# Patient Record
Sex: FEMALE | Race: WHITE | NOT HISPANIC OR LATINO | Employment: OTHER | URBAN - METROPOLITAN AREA
[De-identification: names, ages, dates, MRNs, and addresses within clinical notes are randomized per-mention and may not be internally consistent; named-entity substitution may affect disease eponyms.]

---

## 2017-09-16 ENCOUNTER — HOSPITAL ENCOUNTER (OUTPATIENT)
Dept: GENERAL RADIOLOGY | Facility: HOSPITAL | Age: 67
Discharge: HOME | End: 2017-09-16
Attending: FAMILY MEDICINE | Admitting: FAMILY MEDICINE

## 2018-04-05 ENCOUNTER — HOSPITAL ENCOUNTER (OUTPATIENT)
Dept: MAMMOGRAPHY | Facility: HOSPITAL | Age: 68
Discharge: HOME | End: 2018-04-05
Attending: FAMILY MEDICINE | Admitting: FAMILY MEDICINE

## 2019-08-12 ENCOUNTER — TRANSCRIBE ORDERS (OUTPATIENT)
Dept: ADMINISTRATIVE | Facility: HOSPITAL | Age: 69
End: 2019-08-12

## 2019-08-12 DIAGNOSIS — Z78.0 POST-MENOPAUSAL: ICD-10-CM

## 2019-08-12 DIAGNOSIS — Z12.39 SCREENING BREAST EXAMINATION: Primary | ICD-10-CM

## 2019-08-21 ENCOUNTER — OFFICE (AMBULATORY)
Dept: URBAN - METROPOLITAN AREA PATHOLOGY 4 | Facility: PATHOLOGY | Age: 69
End: 2019-08-21

## 2019-08-21 ENCOUNTER — LAB REQUISITION (OUTPATIENT)
Dept: LAB | Facility: HOSPITAL | Age: 69
End: 2019-08-21

## 2019-08-21 ENCOUNTER — ON CAMPUS - OUTPATIENT (AMBULATORY)
Dept: URBAN - METROPOLITAN AREA HOSPITAL 2 | Facility: HOSPITAL | Age: 69
End: 2019-08-21

## 2019-08-21 VITALS
DIASTOLIC BLOOD PRESSURE: 86 MMHG | DIASTOLIC BLOOD PRESSURE: 80 MMHG | OXYGEN SATURATION: 97 % | SYSTOLIC BLOOD PRESSURE: 134 MMHG | SYSTOLIC BLOOD PRESSURE: 120 MMHG | DIASTOLIC BLOOD PRESSURE: 82 MMHG | TEMPERATURE: 97.6 F | DIASTOLIC BLOOD PRESSURE: 71 MMHG | HEART RATE: 70 BPM | HEART RATE: 62 BPM | HEART RATE: 58 BPM | HEART RATE: 73 BPM | RESPIRATION RATE: 16 BRPM | DIASTOLIC BLOOD PRESSURE: 47 MMHG | SYSTOLIC BLOOD PRESSURE: 113 MMHG | WEIGHT: 98 LBS | SYSTOLIC BLOOD PRESSURE: 155 MMHG | HEART RATE: 68 BPM | SYSTOLIC BLOOD PRESSURE: 126 MMHG | DIASTOLIC BLOOD PRESSURE: 67 MMHG | DIASTOLIC BLOOD PRESSURE: 74 MMHG | OXYGEN SATURATION: 99 % | HEART RATE: 74 BPM | HEART RATE: 63 BPM | OXYGEN SATURATION: 100 % | OXYGEN SATURATION: 98 % | OXYGEN SATURATION: 95 % | SYSTOLIC BLOOD PRESSURE: 143 MMHG | DIASTOLIC BLOOD PRESSURE: 57 MMHG | SYSTOLIC BLOOD PRESSURE: 133 MMHG | SYSTOLIC BLOOD PRESSURE: 109 MMHG

## 2019-08-21 DIAGNOSIS — K64.8 OTHER HEMORRHOIDS: ICD-10-CM

## 2019-08-21 DIAGNOSIS — K62.1 RECTAL POLYP: ICD-10-CM

## 2019-08-21 DIAGNOSIS — Z86.010 HISTORY OF COLONIC POLYPS: ICD-10-CM

## 2019-08-21 DIAGNOSIS — Z86.010 PERSONAL HISTORY OF COLONIC POLYPS: ICD-10-CM

## 2019-08-21 DIAGNOSIS — K57.30 DIVERTICULOSIS OF LARGE INTESTINE WITHOUT PERFORATION OR ABS: ICD-10-CM

## 2019-08-21 DIAGNOSIS — D12.4 BENIGN NEOPLASM OF DESCENDING COLON: ICD-10-CM

## 2019-08-21 DIAGNOSIS — K57.30 DIVERTICULOSIS OF LARGE INTESTINE WITHOUT PERFORATION OR ABSCESS WITHOUT BLEEDING: ICD-10-CM

## 2019-08-21 DIAGNOSIS — D12.0 BENIGN NEOPLASM OF CECUM: ICD-10-CM

## 2019-08-21 DIAGNOSIS — D12.8 BENIGN NEOPLASM OF RECTUM: ICD-10-CM

## 2019-08-21 LAB
GI HISTOLOGY: A. UNSPECIFIED: (no result)
GI HISTOLOGY: B. UNSPECIFIED: (no result)
GI HISTOLOGY: C. UNSPECIFIED: (no result)
GI HISTOLOGY: PDF REPORT: (no result)

## 2019-08-21 PROCEDURE — 45385 COLONOSCOPY W/LESION REMOVAL: CPT | Mod: PT | Performed by: INTERNAL MEDICINE

## 2019-08-21 PROCEDURE — 88305 TISSUE EXAM BY PATHOLOGIST: CPT | Mod: 26 | Performed by: INTERNAL MEDICINE

## 2019-08-21 PROCEDURE — 88305 TISSUE EXAM BY PATHOLOGIST: CPT | Performed by: INTERNAL MEDICINE

## 2019-08-22 LAB
LAB AP CASE REPORT: NORMAL
PATH REPORT.FINAL DX SPEC: NORMAL
PATH REPORT.GROSS SPEC: NORMAL

## 2019-08-28 ENCOUNTER — HOSPITAL ENCOUNTER (OUTPATIENT)
Dept: BONE DENSITY | Facility: HOSPITAL | Age: 69
Discharge: HOME | End: 2019-08-28

## 2019-08-28 ENCOUNTER — HOSPITAL ENCOUNTER (OUTPATIENT)
Dept: MAMMOGRAPHY | Facility: HOSPITAL | Age: 69
Discharge: HOME | End: 2019-08-28
Admitting: FAMILY MEDICINE

## 2019-08-28 DIAGNOSIS — Z12.39 SCREENING BREAST EXAMINATION: ICD-10-CM

## 2019-08-28 DIAGNOSIS — Z78.0 POST-MENOPAUSAL: ICD-10-CM

## 2019-08-28 PROCEDURE — 77063 BREAST TOMOSYNTHESIS BI: CPT

## 2019-08-28 PROCEDURE — 77067 SCR MAMMO BI INCL CAD: CPT

## 2019-08-28 PROCEDURE — 77080 DXA BONE DENSITY AXIAL: CPT

## 2019-10-31 ENCOUNTER — HOSPITAL ENCOUNTER (OUTPATIENT)
Dept: GENERAL RADIOLOGY | Facility: HOSPITAL | Age: 69
Discharge: HOME | End: 2019-10-31
Admitting: NURSE PRACTITIONER

## 2019-10-31 ENCOUNTER — TRANSCRIBE ORDERS (OUTPATIENT)
Dept: ADMINISTRATIVE | Facility: HOSPITAL | Age: 69
End: 2019-10-31

## 2019-10-31 DIAGNOSIS — J44.1 OBSTRUCTIVE CHRONIC BRONCHITIS WITH EXACERBATION (HCC): ICD-10-CM

## 2019-10-31 DIAGNOSIS — J44.1 OBSTRUCTIVE CHRONIC BRONCHITIS WITH EXACERBATION (HCC): Primary | ICD-10-CM

## 2019-10-31 PROCEDURE — 71046 X-RAY EXAM CHEST 2 VIEWS: CPT

## 2019-11-06 ENCOUNTER — HOSPITAL ENCOUNTER (EMERGENCY)
Facility: HOSPITAL | Age: 69
Discharge: HOME | End: 2019-11-06
Attending: EMERGENCY MEDICINE | Admitting: EMERGENCY MEDICINE

## 2019-11-06 ENCOUNTER — APPOINTMENT (OUTPATIENT)
Dept: MRI IMAGING | Facility: HOSPITAL | Age: 69
End: 2019-11-06

## 2019-11-06 VITALS
WEIGHT: 103.17 LBS | HEIGHT: 59 IN | RESPIRATION RATE: 16 BRPM | SYSTOLIC BLOOD PRESSURE: 141 MMHG | TEMPERATURE: 98.4 F | BODY MASS INDEX: 20.8 KG/M2 | OXYGEN SATURATION: 93 % | DIASTOLIC BLOOD PRESSURE: 86 MMHG | HEART RATE: 64 BPM

## 2019-11-06 DIAGNOSIS — M50.30 DEGENERATIVE DISC DISEASE, CERVICAL: ICD-10-CM

## 2019-11-06 DIAGNOSIS — M48.02 CERVICAL SPINAL STENOSIS: ICD-10-CM

## 2019-11-06 DIAGNOSIS — S13.9XXA ACUTE NECK SPRAIN, INITIAL ENCOUNTER: Primary | ICD-10-CM

## 2019-11-06 PROCEDURE — 25010000002 LORAZEPAM PER 2 MG: Performed by: EMERGENCY MEDICINE

## 2019-11-06 PROCEDURE — 99283 EMERGENCY DEPT VISIT LOW MDM: CPT

## 2019-11-06 PROCEDURE — 72141 MRI NECK SPINE W/O DYE: CPT

## 2019-11-06 PROCEDURE — 25010000002 KETOROLAC TROMETHAMINE PER 15 MG: Performed by: EMERGENCY MEDICINE

## 2019-11-06 PROCEDURE — 96374 THER/PROPH/DIAG INJ IV PUSH: CPT

## 2019-11-06 PROCEDURE — 96375 TX/PRO/DX INJ NEW DRUG ADDON: CPT

## 2019-11-06 RX ORDER — LORAZEPAM 2 MG/ML
1 INJECTION INTRAMUSCULAR ONCE
Status: COMPLETED | OUTPATIENT
Start: 2019-11-06 | End: 2019-11-06

## 2019-11-06 RX ORDER — HYDROCODONE BITARTRATE AND ACETAMINOPHEN 5; 325 MG/1; MG/1
1 TABLET ORAL EVERY 6 HOURS PRN
Qty: 12 TABLET | Refills: 0 | Status: SHIPPED | OUTPATIENT
Start: 2019-11-06 | End: 2020-01-07

## 2019-11-06 RX ORDER — KETOROLAC TROMETHAMINE 15 MG/ML
15 INJECTION, SOLUTION INTRAMUSCULAR; INTRAVENOUS ONCE
Status: COMPLETED | OUTPATIENT
Start: 2019-11-06 | End: 2019-11-06

## 2019-11-06 RX ORDER — METHYLPREDNISOLONE 4 MG/1
TABLET ORAL
Qty: 1 EACH | Refills: 0 | Status: SHIPPED | OUTPATIENT
Start: 2019-11-06 | End: 2020-01-07

## 2019-11-06 RX ADMIN — KETOROLAC TROMETHAMINE 15 MG: 15 INJECTION, SOLUTION INTRAMUSCULAR; INTRAVENOUS at 09:30

## 2019-11-06 RX ADMIN — LORAZEPAM 1 MG: 2 INJECTION, SOLUTION INTRAMUSCULAR; INTRAVENOUS at 09:30

## 2019-11-06 NOTE — ED NOTES
Pt states that she has had neck pain since 0430 this morning and that she can not move her neck at all.     Gregor Morataya, LPN  11/06/19 0878

## 2019-11-06 NOTE — DISCHARGE INSTRUCTIONS
Medication as directed  Also use Tylenol or ibuprofen for discomfort  No work next 5 days  No heavy lifting or pulling next 5 days  Follow-up is essential

## 2019-11-06 NOTE — ED PROVIDER NOTES
Subjective   69-year-old female complaining of increased neck pain this morning.  She states that she is been somewhat more active than usual.  She states she occasionally has radicular type pain and paresthesias in both upper extremities with the right being worse.  She is right-hand dominant.  She reports no decrease in sensation or circulation reports no recent fever chills.  She reports no nausea vomiting or diarrhea or change in bowel or bladder habits            Review of Systems   Musculoskeletal: Positive for neck pain and neck stiffness.   Neurological: Positive for weakness.   All other systems reviewed and are negative.      Past Medical History:   Diagnosis Date   • Breast cyst    • Fibrocystic breast        Allergies   Allergen Reactions   • Codeine GI Intolerance       Past Surgical History:   Procedure Laterality Date   • BREAST CYST EXCISION     • HYSTERECTOMY         Family History   Problem Relation Age of Onset   • Ovarian cancer Daughter        Social History     Socioeconomic History   • Marital status:      Spouse name: Not on file   • Number of children: Not on file   • Years of education: Not on file   • Highest education level: Not on file           Objective   Physical Exam  Alert Keller Coma Scale 15   HEENT: Pupils equal and reactive to light. Conjunctivae are not injected. normal tympanic membranes. Oropharynx and nares are normal.   Neck: Supple. Midline trachea. No JVD. No goiter.  There is bilateral sternocleidomastoid and trapezius muscle area discomfort there is also posterior midline discomfort  Chest: Clear and equal breath sounds bilaterally regular rate and rhythm without murmur or rub.   Abdomen: Positive bowel sounds nontender nondistended. No rebound or peritoneal signs. No CVA tenderness.   Extremities no clubbing cyanosis or edema motor sensory exam is normal the full range of motion is intact   skin: Warm and dry, no rashes or petechia.   Lymphatic: No regional  lymphadenopathy. No calf pain, swelling or Brittanie's sign    Procedures           ED Course          Labs Reviewed - No data to display  Medications   LORazepam (ATIVAN) injection 1 mg (1 mg Intravenous Given 11/6/19 0930)   ketorolac (TORADOL) injection 15 mg (15 mg Intravenous Given 11/6/19 0930)     Mri Cervical Spine Without Contrast    Result Date: 11/6/2019  No acute osseous abnormality. Multilevel degenerative changes are present throughout the spine likely superimposed on a congenitally narrowed canal. Altered level canal stenosis is present extending from C3-C4 through C5-C6 as above. No definite cord signal abnormalities identified though evaluation is limited due to motion artifact. Varying degrees of neural foraminal narrowing as described above.   Electronically Signed By-Opal Busby On:11/6/2019 10:20 AM This report was finalized on [id number] by  Opal Busby, .            MDM  Number of Diagnoses or Management Options     Amount and/or Complexity of Data Reviewed  Tests in the radiology section of CPT®: reviewed  Obtain history from someone other than the patient: yes  Independent visualization of images, tracings, or specimens: yes    Risk of Complications, Morbidity, and/or Mortality  Presenting problems: high  Diagnostic procedures: high  General comments: Case was discussed with family members who provided information.  The patient be discharged a prescription for hydrocodone and methylprednisolone pack.  The patient will follow up with the [facility] spine doctor she is stable at discharge and vocalized understanding of discharge instructions and warning    Pain was relieved in the emergency department    Final diagnoses:   Acute neck sprain, initial encounter   Degenerative disc disease, cervical   Cervical spinal stenosis              Perez Vernon MD  11/06/19 3499

## 2019-11-11 ENCOUNTER — TRANSCRIBE ORDERS (OUTPATIENT)
Dept: PHYSICAL THERAPY | Facility: CLINIC | Age: 69
End: 2019-11-11

## 2019-11-11 DIAGNOSIS — M47.892 OTHER OSTEOARTHRITIS OF SPINE, CERVICAL REGION: Primary | ICD-10-CM

## 2019-11-20 ENCOUNTER — TREATMENT (OUTPATIENT)
Dept: PHYSICAL THERAPY | Facility: CLINIC | Age: 69
End: 2019-11-20

## 2019-11-20 DIAGNOSIS — M47.892 OTHER OSTEOARTHRITIS OF SPINE, CERVICAL REGION: Primary | ICD-10-CM

## 2019-11-20 PROCEDURE — 97110 THERAPEUTIC EXERCISES: CPT | Performed by: PHYSICAL THERAPIST

## 2019-11-20 PROCEDURE — 97161 PT EVAL LOW COMPLEX 20 MIN: CPT | Performed by: PHYSICAL THERAPIST

## 2019-11-20 PROCEDURE — 97140 MANUAL THERAPY 1/> REGIONS: CPT | Performed by: PHYSICAL THERAPIST

## 2019-11-20 NOTE — PROGRESS NOTES
Physical Therapy Initial Evaluation and Plan of Care    Patient: Lucrecia Ramesh   : 1950  Diagnosis/ICD-10 Code:  Other osteoarthritis of spine, cervical region [M47.892]  Referring practitioner: Eugene Swanson MD  Date of Initial Visit: 2019  Today's Date: 2019  Patient seen for 1 sessions           Subjective Questionnaire: NDI: 0/50      Subjective Evaluation    History of Present Illness  Mechanism of injury: Pt is referred to therapy due to sudden onset of severe neck pain.  She woke up one morning and couldn't move her neck it was very painful. Onset 2 weeks ago. She went to ER and had MRI/ x-rays and was given meds. She took the week off and went back to work this week. Is doing a lot better now.  Has very mild pain and discomfort in the R side.  she had a stroke 5 years ago has had some discomfort and tightness in R side since her stroke.  The ER Dr said she had several bulging  discs.  Has a some  numbness in R hand since the stroke. 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 She drives. Her daughter and 3 children live with her.     Quality of life: good    Pain  No pain reported    Social Support  Lives with: adult children    Patient Goals  Patient goals for therapy: increased motion             Objective       Postural Observations  Seated posture: fair  Standing posture: fair  Correction of posture: has no consistent effect    Additional Postural Observation Details  Presents with FHP/ RS    Tenderness     Additional Tenderness Details  Mild TTP R UTs/ scap ms    Active Range of Motion     Additional Active Range of Motion Details  Cervical ROM: wfl all directions, slight inc pain and co tightness in R side/ UT/ scap ms with all movements    Cervical distraction/ compression: no change in symptoms    Supine manual traction: pulling sensation in R side no change in symptoms    UE ROM/strength: wnl B          Assessment & Plan     Assessment  Impairments: activity intolerance, lacks  appropriate home exercise program and pain with function  Assessment details: Pt is a 70 y/o f presents to therapy due to inc R sided neck pain and limited ROM and severe pain onset 2 weeks ago. She is doing better now has very mild pain in R side, UTs/ scap ms. Her ROM is wfl with mild pain in R side with cervical ROM. She has hx of stroke 5 years ago and has had mild pain and tightness in R side with mild tingling sensation in R hand since her stroke.  Presently she is not limited and only has mild pain. She may benefit from therapy for postural education and ROM/ stretching exercises to improve ms functions and resolve remaining pain and discomfort.     Goals  Plan Goals: STGs:  In 2 weeks  1- Pt will  report at least 50 % improvement and pain reduction   2- Pt will be independent with initial HEP   3- Pt will tolerate progression of HEP and her exercise program     LTGs: By DC   1- Pt will report at least 80 % improvement and pain reduction   2- Pt will be independent with final HEP and self management of her condition   3- Pt will voice readiness for DC with independent program   4- Pt will demonstrate improved posture and body mechanics    Plan  Therapy options: will be seen for skilled physical therapy services  Planned modality interventions: ultrasound  Planned therapy interventions: manual therapy, functional ROM exercises, home exercise program, postural training, strengthening, stretching and therapeutic activities  Frequency: 1x week  Treatment plan discussed with: patient  Plan details: 10 visits        See flow sheet for treatment detail    History # of Personal Factors and/or Comorbidities: MODERATE (1-2)  Examination of Body System(s): # of elements: LOW (1-2)  Clinical Presentation: STABLE   Clinical Decision Making: LOW           Timed:         Manual Therapy:    10     mins  21499;     Therapeutic Exercise:  15      mins  06088;     Neuromuscular Gabo:        mins  42351;    Therapeutic Activity:           mins  89505;     Gait Training:           mins  88191;     Ultrasound:          mins  40433;    Ionto                                   mins   46422  Self Care                            mins   46338  Canal repositioning           mins    91649      Un-Timed:  Electrical Stimulation:         mins  28968 ( );  Dry Needling          mins self-pay  Traction          mins 78831  Low Eval    20      Mins  20466  Mod Eval          Mins  92568  High Eval                            Mins  68181  Re-Eval                               mins  77702        Timed Treatment:  25    mins   Total Treatment:    45    mins    PT SIGNATURE: Terry Juarez PT, CLT   DATE TREATMENT INITIATED: 11/20/2019    Initial Certification  Certification Period: 2/18/2020  I certify that the therapy services are furnished while this patient is under my care.  The services outlined above are required by this patient, and will be reviewed every 90 days.     PHYSICIAN: Eugene Swanson MD      DATE:     Please sign and return via fax to [phone].. Thank you, [facility].

## 2019-12-23 RX ORDER — ISOSORBIDE MONONITRATE 30 MG/1
TABLET, EXTENDED RELEASE ORAL
Qty: 90 TABLET | Refills: 3 | Status: SHIPPED | OUTPATIENT
Start: 2019-12-23 | End: 2020-01-07

## 2019-12-26 ENCOUNTER — APPOINTMENT (OUTPATIENT)
Dept: GENERAL RADIOLOGY | Facility: HOSPITAL | Age: 69
End: 2019-12-26

## 2019-12-26 ENCOUNTER — HOSPITAL ENCOUNTER (EMERGENCY)
Facility: HOSPITAL | Age: 69
Discharge: HOME | End: 2019-12-26
Admitting: EMERGENCY MEDICINE

## 2019-12-26 VITALS
HEIGHT: 59 IN | WEIGHT: 104.28 LBS | OXYGEN SATURATION: 98 % | HEART RATE: 97 BPM | SYSTOLIC BLOOD PRESSURE: 107 MMHG | DIASTOLIC BLOOD PRESSURE: 64 MMHG | RESPIRATION RATE: 16 BRPM | BODY MASS INDEX: 21.02 KG/M2 | TEMPERATURE: 97.7 F

## 2019-12-26 DIAGNOSIS — M26.622 ARTHRALGIA OF LEFT TEMPOROMANDIBULAR JOINT: Primary | ICD-10-CM

## 2019-12-26 LAB
ALBUMIN SERPL-MCNC: 4.1 G/DL (ref 3.5–5.2)
ALBUMIN/GLOB SERPL: 1.5 G/DL
ALP SERPL-CCNC: 68 U/L (ref 39–117)
ALT SERPL W P-5'-P-CCNC: 12 U/L (ref 1–33)
ANION GAP SERPL CALCULATED.3IONS-SCNC: 10 MMOL/L (ref 5–15)
AST SERPL-CCNC: 18 U/L (ref 1–32)
BASOPHILS # BLD AUTO: 0.1 10*3/MM3 (ref 0–0.2)
BASOPHILS NFR BLD AUTO: 1.2 % (ref 0–1.5)
BILIRUB SERPL-MCNC: <0.2 MG/DL (ref 0.2–1.2)
BUN BLD-MCNC: 13 MG/DL (ref 8–23)
BUN/CREAT SERPL: 17.1 (ref 7–25)
CALCIUM SPEC-SCNC: 9.2 MG/DL (ref 8.6–10.5)
CHLORIDE SERPL-SCNC: 104 MMOL/L (ref 98–107)
CO2 SERPL-SCNC: 25 MMOL/L (ref 22–29)
CREAT BLD-MCNC: 0.76 MG/DL (ref 0.57–1)
DEPRECATED RDW RBC AUTO: 45.9 FL (ref 37–54)
EOSINOPHIL # BLD AUTO: 0.3 10*3/MM3 (ref 0–0.4)
EOSINOPHIL NFR BLD AUTO: 4.2 % (ref 0.3–6.2)
ERYTHROCYTE [DISTWIDTH] IN BLOOD BY AUTOMATED COUNT: 13.5 % (ref 12.3–15.4)
GFR SERPL CREATININE-BSD FRML MDRD: 75 ML/MIN/1.73
GLOBULIN UR ELPH-MCNC: 2.7 GM/DL
GLUCOSE BLD-MCNC: 108 MG/DL (ref 65–99)
HCT VFR BLD AUTO: 46.2 % (ref 34–46.6)
HGB BLD-MCNC: 15.5 G/DL (ref 12–15.9)
HOLD SPECIMEN: NORMAL
HOLD SPECIMEN: NORMAL
LYMPHOCYTES # BLD AUTO: 1 10*3/MM3 (ref 0.7–3.1)
LYMPHOCYTES NFR BLD AUTO: 17.1 % (ref 19.6–45.3)
MCH RBC QN AUTO: 32.6 PG (ref 26.6–33)
MCHC RBC AUTO-ENTMCNC: 33.5 G/DL (ref 31.5–35.7)
MCV RBC AUTO: 97.2 FL (ref 79–97)
MONOCYTES # BLD AUTO: 0.6 10*3/MM3 (ref 0.1–0.9)
MONOCYTES NFR BLD AUTO: 10.4 % (ref 5–12)
NEUTROPHILS # BLD AUTO: 4 10*3/MM3 (ref 1.7–7)
NEUTROPHILS NFR BLD AUTO: 67.1 % (ref 42.7–76)
NRBC BLD AUTO-RTO: 0.1 /100 WBC (ref 0–0.2)
PLATELET # BLD AUTO: 224 10*3/MM3 (ref 140–450)
PMV BLD AUTO: 7.1 FL (ref 6–12)
POTASSIUM BLD-SCNC: 4.2 MMOL/L (ref 3.5–5.2)
PROT SERPL-MCNC: 6.8 G/DL (ref 6–8.5)
RBC # BLD AUTO: 4.75 10*6/MM3 (ref 3.77–5.28)
SODIUM BLD-SCNC: 139 MMOL/L (ref 136–145)
TROPONIN T SERPL-MCNC: <0.01 NG/ML (ref 0–0.03)
WBC NRBC COR # BLD: 6 10*3/MM3 (ref 3.4–10.8)
WHOLE BLOOD HOLD SPECIMEN: NORMAL
WHOLE BLOOD HOLD SPECIMEN: NORMAL

## 2019-12-26 PROCEDURE — 71045 X-RAY EXAM CHEST 1 VIEW: CPT

## 2019-12-26 PROCEDURE — 99284 EMERGENCY DEPT VISIT MOD MDM: CPT

## 2019-12-26 PROCEDURE — 85025 COMPLETE CBC W/AUTO DIFF WBC: CPT | Performed by: NURSE PRACTITIONER

## 2019-12-26 PROCEDURE — 93005 ELECTROCARDIOGRAM TRACING: CPT | Performed by: NURSE PRACTITIONER

## 2019-12-26 PROCEDURE — 84484 ASSAY OF TROPONIN QUANT: CPT | Performed by: NURSE PRACTITIONER

## 2019-12-26 PROCEDURE — 70355 PANORAMIC X-RAY OF JAWS: CPT

## 2019-12-26 PROCEDURE — 80053 COMPREHEN METABOLIC PANEL: CPT | Performed by: NURSE PRACTITIONER

## 2019-12-26 RX ORDER — ASPIRIN 81 MG/1
324 TABLET, CHEWABLE ORAL ONCE
Status: COMPLETED | OUTPATIENT
Start: 2019-12-26 | End: 2019-12-26

## 2019-12-26 RX ORDER — SODIUM CHLORIDE 0.9 % (FLUSH) 0.9 %
10 SYRINGE (ML) INJECTION AS NEEDED
Status: DISCONTINUED | OUTPATIENT
Start: 2019-12-26 | End: 2019-12-26 | Stop reason: HOSPADM

## 2019-12-26 RX ORDER — LIDOCAINE 50 MG/G
1 PATCH TOPICAL ONCE
Status: DISCONTINUED | OUTPATIENT
Start: 2019-12-26 | End: 2019-12-26 | Stop reason: HOSPADM

## 2019-12-26 RX ORDER — CYCLOBENZAPRINE HCL 10 MG
10 TABLET ORAL ONCE
Status: COMPLETED | OUTPATIENT
Start: 2019-12-26 | End: 2019-12-26

## 2019-12-26 RX ORDER — CYCLOBENZAPRINE HCL 5 MG
5 TABLET ORAL 2 TIMES DAILY PRN
Qty: 15 TABLET | Refills: 0 | Status: SHIPPED | OUTPATIENT
Start: 2019-12-26 | End: 2019-12-31

## 2019-12-26 RX ORDER — LIDOCAINE 50 MG/G
1 PATCH TOPICAL EVERY 24 HOURS
Qty: 5 PATCH | Refills: 0 | Status: SHIPPED | OUTPATIENT
Start: 2019-12-26 | End: 2020-01-07

## 2019-12-26 RX ADMIN — LIDOCAINE 1 PATCH: 50 PATCH CUTANEOUS at 07:40

## 2019-12-26 RX ADMIN — CYCLOBENZAPRINE HYDROCHLORIDE 10 MG: 10 TABLET, FILM COATED ORAL at 07:00

## 2019-12-26 RX ADMIN — ASPIRIN 81 MG 324 MG: 81 TABLET ORAL at 07:00

## 2020-01-07 ENCOUNTER — APPOINTMENT (OUTPATIENT)
Dept: NUCLEAR MEDICINE | Facility: HOSPITAL | Age: 70
End: 2020-01-07

## 2020-01-07 ENCOUNTER — HOSPITAL ENCOUNTER (OUTPATIENT)
Facility: HOSPITAL | Age: 70
Setting detail: OBSERVATION
Discharge: HOME | End: 2020-01-07
Attending: EMERGENCY MEDICINE | Admitting: INTERNAL MEDICINE

## 2020-01-07 ENCOUNTER — APPOINTMENT (OUTPATIENT)
Dept: GENERAL RADIOLOGY | Facility: HOSPITAL | Age: 70
End: 2020-01-07

## 2020-01-07 VITALS
OXYGEN SATURATION: 94 % | DIASTOLIC BLOOD PRESSURE: 62 MMHG | SYSTOLIC BLOOD PRESSURE: 116 MMHG | RESPIRATION RATE: 14 BRPM | BODY MASS INDEX: 20.4 KG/M2 | TEMPERATURE: 98.4 F | HEIGHT: 59 IN | WEIGHT: 101.19 LBS | HEART RATE: 66 BPM

## 2020-01-07 DIAGNOSIS — R07.9 CHEST PAIN, UNSPECIFIED TYPE: Primary | ICD-10-CM

## 2020-01-07 PROBLEM — I10 ESSENTIAL HYPERTENSION: Chronic | Status: ACTIVE | Noted: 2020-01-07

## 2020-01-07 PROBLEM — J44.9 COPD (CHRONIC OBSTRUCTIVE PULMONARY DISEASE) (HCC): Chronic | Status: ACTIVE | Noted: 2020-01-07

## 2020-01-07 PROBLEM — E78.5 HYPERLIPIDEMIA: Chronic | Status: ACTIVE | Noted: 2020-01-07

## 2020-01-07 PROBLEM — I25.10 CAD (CORONARY ARTERY DISEASE): Chronic | Status: ACTIVE | Noted: 2020-01-07

## 2020-01-07 PROBLEM — F17.200 CURRENT SMOKER: Chronic | Status: ACTIVE | Noted: 2020-01-07

## 2020-01-07 LAB
ALBUMIN SERPL-MCNC: 4.1 G/DL (ref 3.5–5.2)
ALBUMIN SERPL-MCNC: 4.4 G/DL (ref 3.5–5.2)
ALBUMIN/GLOB SERPL: 1.6 G/DL
ALBUMIN/GLOB SERPL: 1.7 G/DL
ALP SERPL-CCNC: 71 U/L (ref 39–117)
ALP SERPL-CCNC: 73 U/L (ref 39–117)
ALT SERPL W P-5'-P-CCNC: 11 U/L (ref 1–33)
ALT SERPL W P-5'-P-CCNC: 12 U/L (ref 1–33)
ANION GAP SERPL CALCULATED.3IONS-SCNC: 11 MMOL/L (ref 5–15)
ANION GAP SERPL CALCULATED.3IONS-SCNC: 12 MMOL/L (ref 5–15)
AST SERPL-CCNC: 18 U/L (ref 1–32)
AST SERPL-CCNC: 19 U/L (ref 1–32)
BASOPHILS # BLD AUTO: 0.1 10*3/MM3 (ref 0–0.2)
BASOPHILS # BLD AUTO: 0.1 10*3/MM3 (ref 0–0.2)
BASOPHILS NFR BLD AUTO: 0.9 % (ref 0–1.5)
BASOPHILS NFR BLD AUTO: 1.1 % (ref 0–1.5)
BILIRUB SERPL-MCNC: 0.2 MG/DL (ref 0.2–1.2)
BILIRUB SERPL-MCNC: 0.2 MG/DL (ref 0.2–1.2)
BUN BLD-MCNC: 15 MG/DL (ref 8–23)
BUN BLD-MCNC: 17 MG/DL (ref 8–23)
BUN/CREAT SERPL: 21.7 (ref 7–25)
BUN/CREAT SERPL: 22.7 (ref 7–25)
CALCIUM SPEC-SCNC: 10.2 MG/DL (ref 8.6–10.5)
CALCIUM SPEC-SCNC: 10.3 MG/DL (ref 8.6–10.5)
CHLORIDE SERPL-SCNC: 104 MMOL/L (ref 98–107)
CHLORIDE SERPL-SCNC: 105 MMOL/L (ref 98–107)
CHOLEST SERPL-MCNC: 139 MG/DL (ref 0–200)
CK SERPL-CCNC: 150 U/L (ref 20–180)
CO2 SERPL-SCNC: 23 MMOL/L (ref 22–29)
CO2 SERPL-SCNC: 27 MMOL/L (ref 22–29)
CREAT BLD-MCNC: 0.69 MG/DL (ref 0.57–1)
CREAT BLD-MCNC: 0.75 MG/DL (ref 0.57–1)
DEPRECATED RDW RBC AUTO: 44.6 FL (ref 37–54)
DEPRECATED RDW RBC AUTO: 48.1 FL (ref 37–54)
EOSINOPHIL # BLD AUTO: 0.3 10*3/MM3 (ref 0–0.4)
EOSINOPHIL # BLD AUTO: 0.4 10*3/MM3 (ref 0–0.4)
EOSINOPHIL NFR BLD AUTO: 3.1 % (ref 0.3–6.2)
EOSINOPHIL NFR BLD AUTO: 4.6 % (ref 0.3–6.2)
ERYTHROCYTE [DISTWIDTH] IN BLOOD BY AUTOMATED COUNT: 13.1 % (ref 12.3–15.4)
ERYTHROCYTE [DISTWIDTH] IN BLOOD BY AUTOMATED COUNT: 13.6 % (ref 12.3–15.4)
GFR SERPL CREATININE-BSD FRML MDRD: 76 ML/MIN/1.73
GFR SERPL CREATININE-BSD FRML MDRD: 84 ML/MIN/1.73
GLOBULIN UR ELPH-MCNC: 2.4 GM/DL
GLOBULIN UR ELPH-MCNC: 2.8 GM/DL
GLUCOSE BLD-MCNC: 126 MG/DL (ref 65–99)
GLUCOSE BLD-MCNC: 133 MG/DL (ref 65–99)
HCT VFR BLD AUTO: 45.2 % (ref 34–46.6)
HCT VFR BLD AUTO: 45.7 % (ref 34–46.6)
HDLC SERPL-MCNC: 59 MG/DL (ref 40–60)
HGB BLD-MCNC: 15 G/DL (ref 12–15.9)
HGB BLD-MCNC: 15.9 G/DL (ref 12–15.9)
HOLD SPECIMEN: NORMAL
HOLD SPECIMEN: NORMAL
LDLC SERPL CALC-MCNC: 70 MG/DL (ref 0–100)
LDLC/HDLC SERPL: 1.18 {RATIO}
LIPASE SERPL-CCNC: 25 U/L (ref 13–60)
LYMPHOCYTES # BLD AUTO: 1.2 10*3/MM3 (ref 0.7–3.1)
LYMPHOCYTES # BLD AUTO: 1.9 10*3/MM3 (ref 0.7–3.1)
LYMPHOCYTES NFR BLD AUTO: 13.6 % (ref 19.6–45.3)
LYMPHOCYTES NFR BLD AUTO: 23.9 % (ref 19.6–45.3)
MAGNESIUM SERPL-MCNC: 2.3 MG/DL (ref 1.6–2.4)
MCH RBC QN AUTO: 32.5 PG (ref 26.6–33)
MCH RBC QN AUTO: 33.7 PG (ref 26.6–33)
MCHC RBC AUTO-ENTMCNC: 32.7 G/DL (ref 31.5–35.7)
MCHC RBC AUTO-ENTMCNC: 35.1 G/DL (ref 31.5–35.7)
MCV RBC AUTO: 96.1 FL (ref 79–97)
MCV RBC AUTO: 99.3 FL (ref 79–97)
MONOCYTES # BLD AUTO: 0.7 10*3/MM3 (ref 0.1–0.9)
MONOCYTES # BLD AUTO: 0.8 10*3/MM3 (ref 0.1–0.9)
MONOCYTES NFR BLD AUTO: 10.3 % (ref 5–12)
MONOCYTES NFR BLD AUTO: 8 % (ref 5–12)
NEUTROPHILS # BLD AUTO: 4.7 10*3/MM3 (ref 1.7–7)
NEUTROPHILS # BLD AUTO: 6.6 10*3/MM3 (ref 1.7–7)
NEUTROPHILS NFR BLD AUTO: 60.1 % (ref 42.7–76)
NEUTROPHILS NFR BLD AUTO: 74.4 % (ref 42.7–76)
NRBC BLD AUTO-RTO: 0 /100 WBC (ref 0–0.2)
NRBC BLD AUTO-RTO: 0.1 /100 WBC (ref 0–0.2)
NT-PROBNP SERPL-MCNC: 170.5 PG/ML (ref 5–900)
PHOSPHATE SERPL-MCNC: 5.5 MG/DL (ref 2.5–4.5)
PLATELET # BLD AUTO: 222 10*3/MM3 (ref 140–450)
PLATELET # BLD AUTO: 226 10*3/MM3 (ref 140–450)
PMV BLD AUTO: 7.2 FL (ref 6–12)
PMV BLD AUTO: 7.4 FL (ref 6–12)
POTASSIUM BLD-SCNC: 4.1 MMOL/L (ref 3.5–5.2)
POTASSIUM BLD-SCNC: 4.7 MMOL/L (ref 3.5–5.2)
PROT SERPL-MCNC: 6.5 G/DL (ref 6–8.5)
PROT SERPL-MCNC: 7.2 G/DL (ref 6–8.5)
RBC # BLD AUTO: 4.61 10*6/MM3 (ref 3.77–5.28)
RBC # BLD AUTO: 4.71 10*6/MM3 (ref 3.77–5.28)
SODIUM BLD-SCNC: 139 MMOL/L (ref 136–145)
SODIUM BLD-SCNC: 143 MMOL/L (ref 136–145)
TRIGL SERPL-MCNC: 52 MG/DL (ref 0–150)
TROPONIN T SERPL-MCNC: <0.01 NG/ML (ref 0–0.03)
TSH SERPL DL<=0.05 MIU/L-ACNC: 1.23 UIU/ML (ref 0.27–4.2)
VLDLC SERPL-MCNC: 10.4 MG/DL
WBC NRBC COR # BLD: 7.8 10*3/MM3 (ref 3.4–10.8)
WBC NRBC COR # BLD: 8.8 10*3/MM3 (ref 3.4–10.8)
WHOLE BLOOD HOLD SPECIMEN: NORMAL
WHOLE BLOOD HOLD SPECIMEN: NORMAL

## 2020-01-07 PROCEDURE — 93005 ELECTROCARDIOGRAM TRACING: CPT | Performed by: EMERGENCY MEDICINE

## 2020-01-07 PROCEDURE — G0378 HOSPITAL OBSERVATION PER HR: HCPCS

## 2020-01-07 PROCEDURE — 85025 COMPLETE CBC W/AUTO DIFF WBC: CPT | Performed by: PHYSICIAN ASSISTANT

## 2020-01-07 PROCEDURE — 71045 X-RAY EXAM CHEST 1 VIEW: CPT

## 2020-01-07 PROCEDURE — 99236 HOSP IP/OBS SAME DATE HI 85: CPT | Performed by: INTERNAL MEDICINE

## 2020-01-07 PROCEDURE — 96375 TX/PRO/DX INJ NEW DRUG ADDON: CPT

## 2020-01-07 PROCEDURE — A9500 TC99M SESTAMIBI: HCPCS | Performed by: FAMILY MEDICINE

## 2020-01-07 PROCEDURE — 85025 COMPLETE CBC W/AUTO DIFF WBC: CPT | Performed by: EMERGENCY MEDICINE

## 2020-01-07 PROCEDURE — 83880 ASSAY OF NATRIURETIC PEPTIDE: CPT | Performed by: PHYSICIAN ASSISTANT

## 2020-01-07 PROCEDURE — 96361 HYDRATE IV INFUSION ADD-ON: CPT

## 2020-01-07 PROCEDURE — 83690 ASSAY OF LIPASE: CPT | Performed by: EMERGENCY MEDICINE

## 2020-01-07 PROCEDURE — 80053 COMPREHEN METABOLIC PANEL: CPT | Performed by: PHYSICIAN ASSISTANT

## 2020-01-07 PROCEDURE — 25010000002 REGADENOSON 0.4 MG/5ML SOLUTION: Performed by: INTERNAL MEDICINE

## 2020-01-07 PROCEDURE — 84484 ASSAY OF TROPONIN QUANT: CPT | Performed by: EMERGENCY MEDICINE

## 2020-01-07 PROCEDURE — 93017 CV STRESS TEST TRACING ONLY: CPT

## 2020-01-07 PROCEDURE — 80061 LIPID PANEL: CPT | Performed by: PHYSICIAN ASSISTANT

## 2020-01-07 PROCEDURE — 96374 THER/PROPH/DIAG INJ IV PUSH: CPT

## 2020-01-07 PROCEDURE — 93016 CV STRESS TEST SUPVJ ONLY: CPT | Performed by: NURSE PRACTITIONER

## 2020-01-07 PROCEDURE — 0 TECHNETIUM SESTAMIBI: Performed by: FAMILY MEDICINE

## 2020-01-07 PROCEDURE — 83735 ASSAY OF MAGNESIUM: CPT | Performed by: PHYSICIAN ASSISTANT

## 2020-01-07 PROCEDURE — 99284 EMERGENCY DEPT VISIT MOD MDM: CPT

## 2020-01-07 PROCEDURE — 0 TECHNETIUM SESTAMIBI: Performed by: INTERNAL MEDICINE

## 2020-01-07 PROCEDURE — 25010000002 ONDANSETRON PER 1 MG: Performed by: EMERGENCY MEDICINE

## 2020-01-07 PROCEDURE — 84484 ASSAY OF TROPONIN QUANT: CPT | Performed by: PHYSICIAN ASSISTANT

## 2020-01-07 PROCEDURE — 80053 COMPREHEN METABOLIC PANEL: CPT | Performed by: EMERGENCY MEDICINE

## 2020-01-07 PROCEDURE — 82550 ASSAY OF CK (CPK): CPT | Performed by: PHYSICIAN ASSISTANT

## 2020-01-07 PROCEDURE — 84443 ASSAY THYROID STIM HORMONE: CPT | Performed by: PHYSICIAN ASSISTANT

## 2020-01-07 PROCEDURE — 78452 HT MUSCLE IMAGE SPECT MULT: CPT

## 2020-01-07 PROCEDURE — A9500 TC99M SESTAMIBI: HCPCS | Performed by: INTERNAL MEDICINE

## 2020-01-07 PROCEDURE — 84100 ASSAY OF PHOSPHORUS: CPT | Performed by: PHYSICIAN ASSISTANT

## 2020-01-07 RX ORDER — SODIUM CHLORIDE 0.9 % (FLUSH) 0.9 %
10 SYRINGE (ML) INJECTION AS NEEDED
Status: DISCONTINUED | OUTPATIENT
Start: 2020-01-07 | End: 2020-01-07 | Stop reason: HOSPADM

## 2020-01-07 RX ORDER — CALCIUM GLUCONATE 20 MG/ML
1 INJECTION, SOLUTION INTRAVENOUS AS NEEDED
Status: DISCONTINUED | OUTPATIENT
Start: 2020-01-07 | End: 2020-01-07 | Stop reason: HOSPADM

## 2020-01-07 RX ORDER — MELOXICAM 15 MG/1
15 TABLET ORAL DAILY
COMMUNITY

## 2020-01-07 RX ORDER — ASPIRIN 325 MG
325 TABLET ORAL ONCE
Status: COMPLETED | OUTPATIENT
Start: 2020-01-07 | End: 2020-01-07

## 2020-01-07 RX ORDER — NITROGLYCERIN 0.4 MG/1
0.4 TABLET SUBLINGUAL
Status: DISCONTINUED | OUTPATIENT
Start: 2020-01-07 | End: 2020-01-07 | Stop reason: HOSPADM

## 2020-01-07 RX ORDER — LISINOPRIL 20 MG/1
20 TABLET ORAL DAILY
COMMUNITY
End: 2020-04-30

## 2020-01-07 RX ORDER — MAGNESIUM SULFATE HEPTAHYDRATE 40 MG/ML
2 INJECTION, SOLUTION INTRAVENOUS AS NEEDED
Status: DISCONTINUED | OUTPATIENT
Start: 2020-01-07 | End: 2020-01-07 | Stop reason: HOSPADM

## 2020-01-07 RX ORDER — ONDANSETRON 4 MG/1
4 TABLET, FILM COATED ORAL EVERY 6 HOURS PRN
Status: DISCONTINUED | OUTPATIENT
Start: 2020-01-07 | End: 2020-01-07 | Stop reason: HOSPADM

## 2020-01-07 RX ORDER — AMLODIPINE BESYLATE 5 MG/1
5 TABLET ORAL DAILY
Status: DISCONTINUED | OUTPATIENT
Start: 2020-01-07 | End: 2020-01-07 | Stop reason: HOSPADM

## 2020-01-07 RX ORDER — KETOROLAC TROMETHAMINE 15 MG/ML
15 INJECTION, SOLUTION INTRAMUSCULAR; INTRAVENOUS EVERY 6 HOURS PRN
Status: DISCONTINUED | OUTPATIENT
Start: 2020-01-07 | End: 2020-01-07 | Stop reason: HOSPADM

## 2020-01-07 RX ORDER — POTASSIUM CHLORIDE 1.5 G/1.77G
40 POWDER, FOR SOLUTION ORAL AS NEEDED
Status: DISCONTINUED | OUTPATIENT
Start: 2020-01-07 | End: 2020-01-07 | Stop reason: HOSPADM

## 2020-01-07 RX ORDER — MAGNESIUM SULFATE HEPTAHYDRATE 40 MG/ML
4 INJECTION, SOLUTION INTRAVENOUS AS NEEDED
Status: DISCONTINUED | OUTPATIENT
Start: 2020-01-07 | End: 2020-01-07 | Stop reason: HOSPADM

## 2020-01-07 RX ORDER — LISINOPRIL 20 MG/1
20 TABLET ORAL DAILY
Status: DISCONTINUED | OUTPATIENT
Start: 2020-01-07 | End: 2020-01-07 | Stop reason: HOSPADM

## 2020-01-07 RX ORDER — SIMVASTATIN 10 MG
10 TABLET ORAL NIGHTLY
COMMUNITY

## 2020-01-07 RX ORDER — ISOSORBIDE MONONITRATE 30 MG/1
30 TABLET, EXTENDED RELEASE ORAL DAILY
Status: DISCONTINUED | OUTPATIENT
Start: 2020-01-07 | End: 2020-01-07 | Stop reason: HOSPADM

## 2020-01-07 RX ORDER — CYCLOBENZAPRINE HCL 10 MG
5 TABLET ORAL 2 TIMES DAILY PRN
Status: DISCONTINUED | OUTPATIENT
Start: 2020-01-07 | End: 2020-01-07 | Stop reason: HOSPADM

## 2020-01-07 RX ORDER — ISOSORBIDE MONONITRATE 30 MG/1
30 TABLET, EXTENDED RELEASE ORAL DAILY
COMMUNITY
End: 2020-05-18 | Stop reason: HOSPADM

## 2020-01-07 RX ORDER — NICOTINE 21 MG/24HR
1 PATCH, TRANSDERMAL 24 HOURS TRANSDERMAL DAILY
Status: DISCONTINUED | OUTPATIENT
Start: 2020-01-07 | End: 2020-01-07

## 2020-01-07 RX ORDER — AMLODIPINE BESYLATE 5 MG/1
5 TABLET ORAL DAILY
COMMUNITY
End: 2020-03-05

## 2020-01-07 RX ORDER — GABAPENTIN 300 MG/1
300 CAPSULE ORAL DAILY
Status: DISCONTINUED | OUTPATIENT
Start: 2020-01-07 | End: 2020-01-07 | Stop reason: HOSPADM

## 2020-01-07 RX ORDER — ONDANSETRON 2 MG/ML
4 INJECTION INTRAMUSCULAR; INTRAVENOUS ONCE
Status: COMPLETED | OUTPATIENT
Start: 2020-01-07 | End: 2020-01-07

## 2020-01-07 RX ORDER — ONDANSETRON 2 MG/ML
4 INJECTION INTRAMUSCULAR; INTRAVENOUS EVERY 6 HOURS PRN
Status: DISCONTINUED | OUTPATIENT
Start: 2020-01-07 | End: 2020-01-07 | Stop reason: HOSPADM

## 2020-01-07 RX ORDER — SODIUM CHLORIDE 9 MG/ML
100 INJECTION, SOLUTION INTRAVENOUS CONTINUOUS
Status: DISCONTINUED | OUTPATIENT
Start: 2020-01-07 | End: 2020-01-07

## 2020-01-07 RX ORDER — SODIUM CHLORIDE 0.9 % (FLUSH) 0.9 %
10 SYRINGE (ML) INJECTION EVERY 12 HOURS SCHEDULED
Status: DISCONTINUED | OUTPATIENT
Start: 2020-01-07 | End: 2020-01-07 | Stop reason: HOSPADM

## 2020-01-07 RX ORDER — POTASSIUM CHLORIDE 20 MEQ/1
40 TABLET, EXTENDED RELEASE ORAL AS NEEDED
Status: DISCONTINUED | OUTPATIENT
Start: 2020-01-07 | End: 2020-01-07 | Stop reason: HOSPADM

## 2020-01-07 RX ORDER — DOCUSATE SODIUM 100 MG/1
100 CAPSULE, LIQUID FILLED ORAL 2 TIMES DAILY PRN
Status: DISCONTINUED | OUTPATIENT
Start: 2020-01-07 | End: 2020-01-07 | Stop reason: HOSPADM

## 2020-01-07 RX ORDER — MELOXICAM 15 MG/1
15 TABLET ORAL DAILY
Status: DISCONTINUED | OUTPATIENT
Start: 2020-01-07 | End: 2020-01-07 | Stop reason: HOSPADM

## 2020-01-07 RX ORDER — CYCLOBENZAPRINE HCL 5 MG
5 TABLET ORAL 2 TIMES DAILY PRN
COMMUNITY
End: 2020-01-07 | Stop reason: HOSPADM

## 2020-01-07 RX ORDER — ATORVASTATIN CALCIUM 10 MG/1
10 TABLET, FILM COATED ORAL DAILY
Status: DISCONTINUED | OUTPATIENT
Start: 2020-01-07 | End: 2020-01-07 | Stop reason: HOSPADM

## 2020-01-07 RX ORDER — CALCIUM GLUCONATE 20 MG/ML
2 INJECTION, SOLUTION INTRAVENOUS AS NEEDED
Status: DISCONTINUED | OUTPATIENT
Start: 2020-01-07 | End: 2020-01-07 | Stop reason: HOSPADM

## 2020-01-07 RX ORDER — GABAPENTIN 300 MG/1
300 CAPSULE ORAL DAILY
COMMUNITY
End: 2020-03-05

## 2020-01-07 RX ORDER — CHOLECALCIFEROL (VITAMIN D3) 125 MCG
5 CAPSULE ORAL NIGHTLY PRN
Status: DISCONTINUED | OUTPATIENT
Start: 2020-01-07 | End: 2020-01-07 | Stop reason: HOSPADM

## 2020-01-07 RX ADMIN — Medication 10 ML: at 09:19

## 2020-01-07 RX ADMIN — ONDANSETRON 4 MG: 2 INJECTION INTRAMUSCULAR; INTRAVENOUS at 01:06

## 2020-01-07 RX ADMIN — ATORVASTATIN CALCIUM 10 MG: 10 TABLET, FILM COATED ORAL at 09:19

## 2020-01-07 RX ADMIN — GABAPENTIN 300 MG: 300 CAPSULE ORAL at 09:19

## 2020-01-07 RX ADMIN — SODIUM CHLORIDE 100 ML/HR: 900 INJECTION, SOLUTION INTRAVENOUS at 05:05

## 2020-01-07 RX ADMIN — AMLODIPINE BESYLATE 5 MG: 5 TABLET ORAL at 09:19

## 2020-01-07 RX ADMIN — TECHNETIUM TC 99M SESTAMIBI 1 DOSE: 1 INJECTION INTRAVENOUS at 06:45

## 2020-01-07 RX ADMIN — FAMOTIDINE 20 MG: 10 INJECTION, SOLUTION INTRAVENOUS at 01:06

## 2020-01-07 RX ADMIN — MELOXICAM 15 MG: 15 TABLET ORAL at 09:19

## 2020-01-07 RX ADMIN — REGADENOSON 0.4 MG: 0.08 INJECTION, SOLUTION INTRAVENOUS at 08:05

## 2020-01-07 RX ADMIN — LISINOPRIL 20 MG: 20 TABLET ORAL at 09:19

## 2020-01-07 RX ADMIN — TECHNETIUM TC 99M SESTAMIBI 1 DOSE: 1 INJECTION INTRAVENOUS at 08:05

## 2020-01-07 RX ADMIN — ASPIRIN 325 MG ORAL TABLET 325 MG: 325 PILL ORAL at 02:05

## 2020-01-07 RX ADMIN — ISOSORBIDE MONONITRATE 30 MG: 30 TABLET, EXTENDED RELEASE ORAL at 09:19

## 2020-01-07 RX ADMIN — NITROGLYCERIN 1 INCH: 20 OINTMENT TOPICAL at 02:05

## 2020-01-07 NOTE — ED PROVIDER NOTES
Subjective   Chief complaint nausea chest pain sweaty short of breath    History of present illness 69-year-old female who states that this evening after she laid down she developed some pressure and heaviness of her chest so she with nausea and sweating and then felt short of breath.  The patient states it started about 11:00 PM.  At time she arrived to the hospital she was feeling much better.  There was no neck arm or jaw pain.  Denies any leg pain or swelling no recent long car ride plane or immobilization or foreign travels.  Started about 11:00 PM continuous moderate degree associated with shortness of breath nausea and sweating.  Now much improved.          Review of Systems   Constitutional: Negative for chills and fever.   HENT: Negative for congestion and sore throat.    Eyes: Negative for photophobia and visual disturbance.   Respiratory: Positive for chest tightness and shortness of breath.    Cardiovascular: Positive for chest pain. Negative for leg swelling.   Gastrointestinal: Positive for nausea. Negative for abdominal pain and vomiting.   Endocrine: Negative for cold intolerance and heat intolerance.   Genitourinary: Negative for difficulty urinating and dysuria.   Musculoskeletal: Negative for arthralgias and back pain.   Skin: Negative for color change and pallor.   Neurological: Negative for dizziness and light-headedness.   Psychiatric/Behavioral: Negative for agitation and behavioral problems.       Past Medical History:   Diagnosis Date   • Allergic    • Angina pectoris (CMS/HCC)    • Arthritis    • Asthma    • Breast cyst    • Fibrocystic breast    • Injury of back    • Stroke (CMS/HCC)        Allergies   Allergen Reactions   • Codeine GI Intolerance       Past Surgical History:   Procedure Laterality Date   • BREAST CYST EXCISION     • HYSTERECTOMY         Family History   Problem Relation Age of Onset   • Ovarian cancer Daughter        Social History     Socioeconomic History   • Marital  status:      Spouse name: Not on file   • Number of children: Not on file   • Years of education: Not on file   • Highest education level: Not on file   Tobacco Use   • Smoking status: Current Every Day Smoker   Substance and Sexual Activity   • Alcohol use: No     Frequency: Never     Prior to Admission medications    Medication Sig Start Date End Date Taking? Authorizing Provider   HYDROcodone-acetaminophen (NORCO) 5-325 MG per tablet Take 1 tablet by mouth Every 6 (Six) Hours As Needed for Moderate Pain  or Severe Pain . 11/6/19   Perez Vernon MD   isosorbide mononitrate (IMDUR) 30 MG 24 hr tablet TAKE 1 TABLET BY MOUTH DAILY 12/23/19   KYLIE Cadet MD   lidocaine (LIDODERM) 5 % Place 1 patch on the skin as directed by provider Daily. Cut into 1/4s and apply to jaw & Discard patch within 12 hours or as directed by MD 12/26/19   Deb Macias NP   methylPREDNISolone (MEDROL, DONNIE,) 4 MG tablet Take as directed on package instructions. 11/6/19   Perez Vernon MD           Objective   Physical Exam  69-year-old awake alert no acute distress HEENT extraocular muscle tach sclera clear mouth clear neck supple no adenopathy no JVD no bruits lungs clear no retractions heart regular without murmur abdomen soft without tenderness no pulsatile mass or bruits extremities pulses are equal upper and lower extremities no edema cords or Homans sign evidence of DVT patient's awake alert follows commands motor strength normal without focal weakness.  Procedures           ED Course      Results for orders placed or performed during the hospital encounter of 01/07/20   Comprehensive Metabolic Panel   Result Value Ref Range    Glucose 126 (H) 65 - 99 mg/dL    BUN 17 8 - 23 mg/dL    Creatinine 0.75 0.57 - 1.00 mg/dL    Sodium 143 136 - 145 mmol/L    Potassium 4.1 3.5 - 5.2 mmol/L    Chloride 104 98 - 107 mmol/L    CO2 27.0 22.0 - 29.0 mmol/L    Calcium 10.2 8.6 - 10.5 mg/dL    Total Protein 7.2 6.0 - 8.5 g/dL     Albumin 4.40 3.50 - 5.20 g/dL    ALT (SGPT) 12 1 - 33 U/L    AST (SGOT) 19 1 - 32 U/L    Alkaline Phosphatase 73 39 - 117 U/L    Total Bilirubin 0.2 0.2 - 1.2 mg/dL    eGFR Non African Amer 76 >60 mL/min/1.73    Globulin 2.8 gm/dL    A/G Ratio 1.6 g/dL    BUN/Creatinine Ratio 22.7 7.0 - 25.0    Anion Gap 12.0 5.0 - 15.0 mmol/L   Lipase   Result Value Ref Range    Lipase 25 13 - 60 U/L   Troponin   Result Value Ref Range    Troponin T <0.010 0.000 - 0.030 ng/mL   CBC Auto Differential   Result Value Ref Range    WBC 7.80 3.40 - 10.80 10*3/mm3    RBC 4.71 3.77 - 5.28 10*6/mm3    Hemoglobin 15.9 12.0 - 15.9 g/dL    Hematocrit 45.2 34.0 - 46.6 %    MCV 96.1 79.0 - 97.0 fL    MCH 33.7 (H) 26.6 - 33.0 pg    MCHC 35.1 31.5 - 35.7 g/dL    RDW 13.1 12.3 - 15.4 %    RDW-SD 44.6 37.0 - 54.0 fl    MPV 7.4 6.0 - 12.0 fL    Platelets 226 140 - 450 10*3/mm3    Neutrophil % 60.1 42.7 - 76.0 %    Lymphocyte % 23.9 19.6 - 45.3 %    Monocyte % 10.3 5.0 - 12.0 %    Eosinophil % 4.6 0.3 - 6.2 %    Basophil % 1.1 0.0 - 1.5 %    Neutrophils, Absolute 4.70 1.70 - 7.00 10*3/mm3    Lymphocytes, Absolute 1.90 0.70 - 3.10 10*3/mm3    Monocytes, Absolute 0.80 0.10 - 0.90 10*3/mm3    Eosinophils, Absolute 0.40 0.00 - 0.40 10*3/mm3    Basophils, Absolute 0.10 0.00 - 0.20 10*3/mm3    nRBC 0.0 0.0 - 0.2 /100 WBC     No radiology results for the last day  Medications   sodium chloride 0.9 % flush 10 mL (has no administration in time range)   sodium chloride 0.9 % flush 10 mL (has no administration in time range)   aspirin tablet 325 mg (has no administration in time range)   nitroglycerin (NITROSTAT) ointment 1 inch (has no administration in time range)   ondansetron (ZOFRAN) injection 4 mg (4 mg Intravenous Given 1/7/20 0106)   famotidine (PEPCID) injection 20 mg (20 mg Intravenous Given 1/7/20 0106)     EKG my interpretation normal sinus rhythm rate of 66 left axis deviation consider anterior septal infarct which is unchanged from previous EKG QTC  of 422 abnormal     Chest x-ray negative                                      MDM  Number of Diagnoses or Management Options  Chest pain, unspecified type:   Diagnosis management comments: Medical decision making.  Patient IV established placed on a cardiac monitor and was given aspirin p.o. Zofran 4 mg IV Pepcid 20 mg IV once Nitropaste.  EKG shows no acute ST elevation.  No acute ischemic changes CBC electrolytes unremarkable troponin negative.  Chest x-ray negative.  The patient repeat exam is resting currently she does have some risk factors for heart disease.  The patient had an episode of chest tightness shortness of breath nausea and sweating tonight.  Most of her symptoms occur at night when she lies down and I suspect that ultimately this may be somewhat GI related and possible reflux hiatal hernia.  But her symptoms of sweatiness and shortness of breath are concerning.  I see no evidence of suggest DVT or pulmonary embolism or acute aortic dissection.  Patient was made aware of the findings and she will be placed in observation hospitalist nurse practitioner paged      Final diagnoses:   Chest pain, unspecified type            Abel Jung MD  01/07/20 2488

## 2020-01-07 NOTE — PROGRESS NOTES
Discharge Planning Assessment   [facility]     Patient Name: Lucrecia Ramesh  MRN: [id number]  Today's Date: 1/7/2020    Admit Date: 1/7/2020    Discharge Needs Assessment     Row Name 01/07/20 1238       Living Environment    Lives With  child(jhonny), adult;grandchild(jhonny)    Current Living Arrangements  home/apartment/condo    Able to Return to Prior Arrangements  yes       Transition Planning    Patient/Family Anticipates Transition to  home with family    Patient/Family Anticipated Services at Transition  none    Transportation Anticipated  family or friend will provide       Discharge Needs Assessment    Equipment Currently Used at Home  none        Discharge Plan     Row Name 01/07/20 1239       Plan    Plan  Home with family. Patient denies any needs at this time.     Patient/Family in Agreement with Plan  yes    Plan Comments  Barriers to discharge: Stress test ordered in am. Serial Troponins ordered.                Demographic Summary     Row Name 01/07/20 1235       General Information    Admission Type  observation    Arrived From  emergency department    Required Notices Provided  Observation Status Notice    Referral Source  other (see comments)    Reason for Consult  discharge planning    Preferred Language  English     Used During This Interaction  no       Contact Information    Permission Granted to Share Info With          Functional Status     Row Name 01/07/20 1237       Functional Status    Usual Activity Tolerance  good    Current Activity Tolerance  good       Functional Status, IADL    Medications  independent    Meal Preparation  independent    Housekeeping  independent    Laundry  independent    Shopping  independent       Mental Status    General Appearance WDL  WDL       Mental Status Summary    Recent Changes in Mental Status/Cognitive Functioning  no changes       Employment/    Employment Status  retired        Psychosocial     Row Name 01/07/20 1236        Values/Beliefs    Spiritual, Cultural Beliefs, [employer]ism Practices, Values that Affect Care  no       Behavior WDL    Behavior WDL  WDL       Emotion Mood WDL    Emotion/Mood/Affect WDL  WDL       Speech WDL    Speech WDL  WDL       Perceptual State WDL    Perceptual State WDL  WDL       Thought Process WDL    Thought Process WDL  WDL       Intellectual Performance WDL    Intellectual Performance WDL  WDL       Coping/Stress    Major Change/Loss/Stressor  none    Patient Personal Strengths  able to adapt    Sources of Support  none    Reaction to Health Status  accepting    Understanding of Condition and Treatment  adequate understanding of medical condition       C-SSRS (Screen-Recent) Past Month    Q1 Wished to be Dead (Past Month)  no    Q2 Suicidal Thoughts (Past Month)  no    Q6 Suicide Behavior (Lifetime)  no       Violence Risk    Feels Like Hurting Others  no    Previous Attempt to Harm Others  no          Anna Naegele RN    Phone [phone]  Fax   [phone]

## 2020-01-07 NOTE — H&P
"      [facility] Medicine Services      Patient Name: Lucrecia Ramesh  : 1950  MRN: [id number]  Primary Care Physician: Eugene Swanson MD  Date of admission: 2020    Patient Care Team:  Eugene Swanson MD as PCP - General          Subjective   History Present Illness     Chief Complaint:   Chief Complaint   Patient presents with   • Chest Pain       Ms. Ramesh is a 69 y.o. female with past medical history of hypertension, COPD, CAD and hyperlipidemia who presents to [facility] complaining of chest pain.  Patient states that at approximately 2300 hrs. on 2019 she began to experience a lower substernal and epigastric pain which she describes as a \"hurt\" rated at 7/10 at its worst and 1/10 presently.  She denies any provoking or palliative factors.  No radiation of the pain is reported.  She does note mild shortness of breath and nausea associated with her symptoms.  No recent fever, cough or sick contacts are reported.  Patient states that she has a history of a hiatal hernia which in the past has caused similar symptoms and she sees GI for this regularly.    In the ED patient had labs significant for troponin of less than 0.010.  Chest x-ray shows \"possible new early pulmonary edema with vascular congestion\" no cardiac enlargement is noted.  EKG shows sinus rhythm at 66 without acute ST changes present.         History of Present Illness    Review of Systems   Constitution: Negative.   HENT: Negative.    Eyes: Negative.    Cardiovascular: Positive for chest pain.   Respiratory: Positive for shortness of breath. Negative for cough.    Skin: Negative.    Musculoskeletal: Negative.    Gastrointestinal: Positive for abdominal pain and nausea.   Genitourinary: Negative.    Neurological: Negative.    Psychiatric/Behavioral: Negative.    Allergic/Immunologic: Negative.            Personal History     Past Medical History:   Past Medical History:   Diagnosis Date   • Allergic    • " Angina pectoris (CMS/HCC)    • Arthritis    • Asthma    • Breast cyst    • Fibrocystic breast    • Injury of back    • Stroke (CMS/HCC)        Surgical History:      Past Surgical History:   Procedure Laterality Date   • BREAST CYST EXCISION     • HYSTERECTOMY             Family History: family history includes Ovarian cancer in her daughter. Otherwise pertinent FHx was reviewed and unremarkable.     Social History:  reports that she has been smoking. She has been smoking about 1.50 packs per day. She has never used smokeless tobacco. She reports that she does not drink alcohol or use drugs.      Medications:  Prior to Admission medications    Medication Sig Start Date End Date Taking? Authorizing Provider   amLODIPine (NORVASC) 5 MG tablet Take 5 mg by mouth Daily.   Yes Elder Narayanan MD   cyclobenzaprine (FLEXERIL) 5 MG tablet Take 5 mg by mouth 2 (Two) Times a Day As Needed for Muscle Spasms.   Yes ProviderElder MD   Fluticasone-Umeclidin-Vilant (TRELEGY ELLIPTA) 100-62.5-25 MCG/INH aerosol powder  Inhale 1 puff Daily.   Yes ProviderElder MD   gabapentin (NEURONTIN) 300 MG capsule Take 300 mg by mouth Daily.   Yes ProviderElder MD   isosorbide mononitrate (IMDUR) 30 MG 24 hr tablet Take 30 mg by mouth Daily.   Yes ProviderElder MD   lisinopril (PRINIVIL,ZESTRIL) 20 MG tablet Take 20 mg by mouth Daily.   Yes ProviderElder MD   meloxicam (MOBIC) 15 MG tablet Take 15 mg by mouth Daily.   Yes ProviderElder MD   simvastatin (ZOCOR) 10 MG tablet Take 10 mg by mouth Every Night.   Yes ProviderElder MD   HYDROcodone-acetaminophen (NORCO) 5-325 MG per tablet Take 1 tablet by mouth Every 6 (Six) Hours As Needed for Moderate Pain  or Severe Pain . 11/6/19 1/7/20  Perez Vernon MD   isosorbide mononitrate (IMDUR) 30 MG 24 hr tablet TAKE 1 TABLET BY MOUTH DAILY 12/23/19 1/7/20  KYLIE Cadet MD   lidocaine (LIDODERM) 5 % Place 1 patch on the skin as  directed by provider Daily. Cut into 1/4s and apply to jaw & Discard patch within 12 hours or as directed by MD 12/26/19 1/7/20  Deb Macias NP   methylPREDNISolone (MEDROL, DONNIE,) 4 MG tablet Take as directed on package instructions. 11/6/19 1/7/20  Perez Vernon MD       Allergies:    Allergies   Allergen Reactions   • Codeine GI Intolerance       Objective   Objective     Vital Signs  Temp:  [97.5 °F (36.4 °C)-98.1 °F (36.7 °C)] 98.1 °F (36.7 °C)  Heart Rate:  [62-64] 62  Resp:  [15-16] 16  BP: (101-161)/(58-81) 128/64  SpO2:  [94 %-100 %] 94 %  on   ;   Device (Oxygen Therapy): room air  Body mass index is 20.44 kg/m².    Physical Exam   Constitutional: She is oriented to person, place, and time. She appears well-developed and well-nourished. No distress.   HENT:   Head: Normocephalic and atraumatic.   Right Ear: External ear normal.   Left Ear: External ear normal.   Nose: Nose normal.   Eyes: Conjunctivae and EOM are normal.   Neck: Normal range of motion. JVD present.   Cardiovascular: Normal rate, regular rhythm, normal heart sounds and intact distal pulses.   Pulmonary/Chest: Effort normal and breath sounds normal.   Abdominal: Soft. There is tenderness.   Epigastric tenderness   Musculoskeletal: Normal range of motion.   Neurological: She is alert and oriented to person, place, and time.   Skin: Skin is warm and dry.   Psychiatric: She has a normal mood and affect. Her behavior is normal. Judgment and thought content normal.   Vitals reviewed.      Results Review:  I have personally reviewed most recent cardiac tracings, lab results and radiology images and interpretations and agree with findings, most notably: Troponin, chest x-ray, EKG.    Results from last 7 days   Lab Units 01/07/20  0405   WBC 10*3/mm3 8.80   HEMOGLOBIN g/dL 15.0   HEMATOCRIT % 45.7   PLATELETS 10*3/mm3 222     Results from last 7 days   Lab Units 01/07/20  0405 01/07/20  0034   SODIUM mmol/L 139 143   POTASSIUM mmol/L 4.7 4.1     CHLORIDE mmol/L 105 104   CO2 mmol/L 23.0 27.0   BUN mg/dL 15 17   CREATININE mg/dL 0.69 0.75   GLUCOSE mg/dL 133* 126*   CALCIUM mg/dL 10.3 10.2   ALT (SGPT) U/L 11 12   AST (SGOT) U/L 18 19   TROPONIN T ng/mL <0.010 <0.010   PROBNP pg/mL 170.5  --      Estimated Creatinine Clearance: 46.7 mL/min (by C-G formula based on SCr of 0.69 mg/dL).  Brief Urine Lab Results     None          Microbiology Results (last 10 days)     ** No results found for the last 240 hours. **          ECG/EMG Results (most recent)     Procedure Component Value Units Date/Time    ECG 12 Lead [[id number]] Collected:  01/07/20 0027     Updated:  01/07/20 0028    Narrative:       HEART RATE= 66  bpm  RR Interval= 912  ms  KS Interval= 215  ms  P Horizontal Axis= -43  deg  P Front Axis= 74  deg  QRSD Interval= 67  ms  QT Interval= 403  ms  QRS Axis= -31  deg  T Wave Axis= 59  deg  - ABNORMAL ECG -  Sinus rhythm  Borderline prolonged KS interval  Left axis deviation  Consider anteroseptal infarct  Electronically Signed By:   Date and Time of Study: 2020-01-07 00:27:53                    Xr Chest 1 View    Result Date: 1/7/2020  There is possible new early pulmonary edema with vascular congestion. No cardiac enlargement is seen.  Electronically Signed By-Dr. Angel Alaniz MD On:1/7/2020 2:42 AM This report was finalized on [id number] by Dr. Angel Alaniz MD.        Estimated Creatinine Clearance: 46.7 mL/min (by C-G formula based on SCr of 0.69 mg/dL).    Assessment/Plan   Assessment/Plan       Active Hospital Problems    Diagnosis  POA   • **Chest pain [R07.9]  Yes     Priority: High   • Essential hypertension [I10]  Yes     Priority: Medium   • COPD (chronic obstructive pulmonary disease) (CMS/HCC) [J44.9]  Yes     Priority: Medium   • CAD (coronary artery disease) [I25.10]  Yes     Priority: Medium   • Hyperlipidemia [E78.5]  Yes     Priority: Low      Resolved Hospital Problems   No resolved problems to display.     Chest  pain  -Troponin less than 0.010, trend  -Chest x-ray shows possible early onset pulmonary edema with vascular congestion but no cardiomegaly.  Hold IV fluids  -EKG shows sinus rhythm at 66 without acute ST changes  -Stress test ordered in a.m.  -Continue cardiac monitoring  -Patient may need outpatient GI follow-up if cardiac work-up negative    Hypertension  -Poorly controlled with a blood pressure on admission of 161/81  - Continue Amlodipine and lisinopril  - Monitor while admittied    COPD  Patient states respiratory symptoms currently at baseline  -Continue Trelegy (pharmacy to dose)  -Monitor pulse oximetry    CAD  -Acute chest pain treated as above  -Continue cardiac monitoring  -Continue Imdur    Hyperlipidemia  -Continue statin    Active Hospital Problems:  No notes have been filed under this hospital service.  Service: Hospitalist              VTE Prophylaxis - Lovenox 40 mg SC daily.    CODE STATUS:    Code Status and Medical Interventions:   Ordered at: 01/07/20 0235     Level Of Support Discussed With:    Patient     Code Status:    CPR     Medical Interventions (Level of Support Prior to Arrest):    Full       Admission Status:  I believe this patient meets observation criteria.      I discussed the patients findings and my recommendations with patient and nursing staff.        Electronically signed by Rob Arevalo PA-C, 01/07/20, 2:37 AM.  [facility] Hospitalist Team

## 2020-01-07 NOTE — ASSESSMENT & PLAN NOTE
Lipid-lowering agents  Beta blocker  ACE inhibitor if tolerated  Antithrombotics  Control modifiable risk factors such as smoking  Controlled hypertension

## 2020-01-07 NOTE — ASSESSMENT & PLAN NOTE
Counseled to quit smoking  Long-term compliance is suspect  Nicotine replacement while in the hospital

## 2020-01-07 NOTE — PROGRESS NOTES
[id number] [place] [address]    Pulmonary Disease Educator  Discharge Planning    Patient Name: Lucrecia Ramesh  : 1950  Room #: 109/1  Pulmonologist: None  Admit Diagnosis: Chest pain, unspecified type [R07.9]  Current Admission Date: 2020   Previous Admit: Rash  Previous Admission Date: 2018  Body mass index is 20.44 kg/m².      Lucrecia Ramesh is a current smoker who that continues to smoke 1.5 PPD    PFT’s in the last year?  No       Room Air O2 Sat: 96%   Current O2: Room Air   Home O2: No  Home BiPap/CPAP: No     ABG: No results found for: SITE, ALLENTEST, PHART, OZH8MEX, PO2ART, SKJ3LGJ, BASEEXCESS, T0NLLING, HGBBG, HCTABG, OXYHEMOGLOBI, METHHGBN, CARBOXYHGB, CO2CT, BAROMETRIC, MODALITY, FIO2      Current Home Medications:  Prior to Admission medications    Medication Sig Start Date End Date Taking? Authorizing Provider   amLODIPine (NORVASC) 5 MG tablet Take 5 mg by mouth Daily.   Yes ProviderElder MD   cyclobenzaprine (FLEXERIL) 5 MG tablet Take 5 mg by mouth 2 (Two) Times a Day As Needed for Muscle Spasms.   Yes ProviderElder MD   Fluticasone-Umeclidin-Vilant (TRELEGY ELLIPTA) 100-62.5-25 MCG/INH aerosol powder  Inhale 1 puff Daily.   Yes ProviderElder MD   gabapentin (NEURONTIN) 300 MG capsule Take 300 mg by mouth Daily.   Yes Elder Narayanan MD   isosorbide mononitrate (IMDUR) 30 MG 24 hr tablet Take 30 mg by mouth Daily.   Yes Elder Narayanan MD   lisinopril (PRINIVIL,ZESTRIL) 20 MG tablet Take 20 mg by mouth Daily.   Yes Elder Narayanan MD   meloxicam (MOBIC) 15 MG tablet Take 15 mg by mouth Daily.   Yes Elder Narayanan MD   simvastatin (ZOCOR) 10 MG tablet Take 10 mg by mouth Every Night.   Yes Elder Narayanan MD   HYDROcodone-acetaminophen (NORCO) 5-325 MG per tablet Take 1 tablet by mouth Every 6 (Six) Hours As Needed for Moderate Pain  or Severe Pain . 19  Perez Vernon MD   isosorbide  mononitrate (IMDUR) 30 MG 24 hr tablet TAKE 1 TABLET BY MOUTH DAILY 12/23/19 1/7/20  KYLIE Cadet MD   lidocaine (LIDODERM) 5 % Place 1 patch on the skin as directed by provider Daily. Cut into 1/4s and apply to jaw & Discard patch within 12 hours or as directed by MD 12/26/19 1/7/20  Deb Macias NP   methylPREDNISolone (MEDROL, DONNIE,) 4 MG tablet Take as directed on package instructions. 11/6/19 1/7/20  Perez Vernon MD       Recommendations/Testing:  Lucrecia Ramesh was seen by the pulmonary disease educator for evaluation of care gaps according to the COPD GOLD Guidelines.  After evaluation the patient's cardiopulmonary status, it is the recommendation of the care coordinator that the patient receive the following testing, equipment, or consults.    PFT   6 Minute Walk      Reconciled RT Home Medications:  After evaluation the patient's cardiopulmonary status, it is the recommendation of the care coordinator that the patient receive the following medication changes or additions.    Add Albuterol MDI Q4 PRN  Add Chantix    Prior COPD Education?   No  Prior Pulmonary Rehab?   No  History of COPD admission in the past year?    No    Other Notes: Pt stated that she feels her Trelegy really works well for but would like to have a rescue inhaler to be able to take to work with her. She states her breathing is good most of the time. We talked a lot about smoking cessation and pt stated that she has quit for 2 months in the past. Pt states she is unsure if her insurance will pay any for Chantix. Pt also stated to me that she use to use 2L of oxygen but she was supposed to get it for free and they started sending her bills for more than she could afford so she returned it. Pt may need home O2 again. Pt did good with [place] exercises and really enjoyed the COPD education.    Insurance: Payor: MEDICARE / Plan: MEDICARE A & B / Product Type: *No Product type* /        EDUCATION DONE WITH PATIENT:        Smoking  Cessation         COPD           Pursed Lip Breathing         Diaphragmatic Breathing   Harmonica   Resistance Band Therapy   Relaxation Techniques      STOP BANG (=/> 3 is HIGH RISK):   Do you snore loudly? Yes  Tired/Fatigued during the day? No  Stop Breathing in sleeping? No  High B/P or treated B/P? Yes  BMI greater than 35?   No Body mass index is 20.44 kg/m².  More than 50 years old?  Yes 69 y.o.  Neck Circumference > 40cm    No  Male? No female       TOTAL 3    Has patient seen a sleep Dr.? (Who/When) No  Sleep Study Done? (When) No  Would you like a sleep tech to come talk to you about OMID? No

## 2020-01-07 NOTE — PLAN OF CARE
Problem: Patient Care Overview  Goal: Plan of Care Review  Outcome: Ongoing (interventions implemented as appropriate)  Flowsheets (Taken 1/7/2020 0242)  Progress: improving  Plan of Care Reviewed With: patient  Outcome Summary: New admission. Patient denies pain or shortness of breath. VSS.

## 2020-01-07 NOTE — DISCHARGE SUMMARY
"  Date of Admission: 1/7/2020    Date of Discharge:  1/7/2020    Length of stay:  LOS: 0 days     Presenting Problem/History of Present Illness   Present on Admission:  • Chest pain  • Essential hypertension  • CAD (coronary artery disease)  • Hyperlipidemia  • Current smoker        Hospital Course    Chief Complaint   Patient presents with   • Chest Pain       Ms. Ramesh is a 69 y.o. female with past medical history of hypertension, COPD, CAD and hyperlipidemia who presents to [facility] complaining of chest pain.  Patient states that at approximately 2300 hrs. on 1/6/2019 she began to experience a lower substernal and epigastric pain which she describes as a \"hurt\" rated at 7/10 at its worst and 1/10 presently.  She denies any provoking or palliative factors.  No radiation of the pain is reported.  She does note mild shortness of breath and nausea associated with her symptoms.  No recent fever, cough or sick contacts are reported.  Patient states that she has a history of a hiatal hernia which in the past has caused similar symptoms and she sees GI for this regularly.     In the ED patient had labs significant for troponin of less than 0.010.  Chest x-ray shows \"possible new early pulmonary edema with vascular congestion\" no cardiac enlargement is noted.  EKG shows sinus rhythm at 66 without acute ST changes present.         ROS   Review of Systems   Constitution: Negative.   HENT: Negative.    Eyes: Negative.    Cardiovascular: Positive for chest pain.   Respiratory: Positive for shortness of breath. Negative for cough.    Skin: Negative.    Musculoskeletal: Negative.    Gastrointestinal: Positive for abdominal pain and nausea.   Genitourinary: Negative.    Neurological: Negative.    Psychiatric/Behavioral: Negative.    Allergic/Immunologic: Negative.        Past Medical History:     Past Medical History:   Diagnosis Date   • Allergic    • Angina pectoris (CMS/HCC)    • Arthritis    • Asthma    • Breast " cyst    • Fibrocystic breast    • Injury of back    • Stroke (CMS/HCC)        Past Surgical History:     Past Surgical History:   Procedure Laterality Date   • BREAST CYST EXCISION     • HYSTERECTOMY         Social History:   Social History     Socioeconomic History   • Marital status:      Spouse name: Not on file   • Number of children: Not on file   • Years of education: Not on file   • Highest education level: Not on file   Tobacco Use   • Smoking status: Current Every Day Smoker     Packs/day: 1.50   • Smokeless tobacco: Never Used   Substance and Sexual Activity   • Alcohol use: No     Frequency: Never   • Drug use: Never   • Sexual activity: Defer       Vital Signs  Temp:  [97.5 °F (36.4 °C)-98.4 °F (36.9 °C)] 98.4 °F (36.9 °C)  Heart Rate:  [54-66] 66  Resp:  [14-17] 14  BP: (101-161)/(58-81) 116/62    Physical Exam:  Physical Exam   Constitutional: She is oriented to person, place, and time. She appears well-developed and well-nourished. No distress.   HENT:   Head: Normocephalic and atraumatic.   Right Ear: External ear normal.   Left Ear: External ear normal.   Nose: Nose normal.   Mouth/Throat: Oropharynx is clear and moist. No oropharyngeal exudate.   Eyes: Pupils are equal, round, and reactive to light. Conjunctivae and EOM are normal. Right eye exhibits no discharge. Left eye exhibits no discharge. No scleral icterus.   Neck: Normal range of motion. No JVD present. No tracheal deviation present. No thyromegaly present.   Cardiovascular: Normal rate, regular rhythm, normal heart sounds and intact distal pulses. Exam reveals no gallop and no friction rub.   No murmur heard.  Pulmonary/Chest: Effort normal and breath sounds normal. No stridor. No respiratory distress. She has no wheezes. She has no rales. She exhibits no tenderness.   Abdominal: Soft. Bowel sounds are normal. She exhibits no distension and no mass. There is no tenderness. There is no rebound and no guarding. No hernia.    Musculoskeletal: Normal range of motion. She exhibits no edema, tenderness or deformity.   Lymphadenopathy:     She has no cervical adenopathy.   Neurological: She is alert and oriented to person, place, and time. No cranial nerve deficit or sensory deficit. She exhibits normal muscle tone. Coordination normal.   Skin: Skin is warm and dry. No rash noted. She is not diaphoretic. No erythema.   Psychiatric: She has a normal mood and affect. Her behavior is normal.   Nursing note and vitals reviewed.          Discharge Diagnosis:     Active Hospital Problems    Diagnosis  POA   • **Chest pain [R07.9]  Yes     Priority: High   • Essential hypertension [I10]  Yes     Priority: Medium   • CAD (coronary artery disease) [I25.10]  Yes   • Hyperlipidemia [E78.5]  Yes   • Current smoker [F17.200]  Yes      Resolved Hospital Problems   No resolved problems to display.       Estimated Creatinine Clearance: 46.7 mL/min (by C-G formula based on SCr of 0.69 mg/dL).    Discharge Disposition    Destination      Coordination has not been started for this encounter.      Durable Medical Equipment      Coordination has not been started for this encounter.      Dialysis/Infusion      Coordination has not been started for this encounter.      Home Medical Care      Coordination has not been started for this encounter.      Therapy      Coordination has not been started for this encounter.      Community Resources      Coordination has not been started for this encounter.              PT Recommendation and Plan          Home or Self Care           Discharge Medications      Continue These Medications      Instructions Start Date   amLODIPine 5 MG tablet  Commonly known as:  NORVASC   5 mg, Oral, Daily      gabapentin 300 MG capsule  Commonly known as:  NEURONTIN   300 mg, Oral, Daily      isosorbide mononitrate 30 MG 24 hr tablet  Commonly known as:  IMDUR   30 mg, Oral, Daily      lisinopril 20 MG tablet  Commonly known as:   PRINIVIL,ZESTRIL   20 mg, Oral, Daily      meloxicam 15 MG tablet  Commonly known as:  MOBIC   15 mg, Oral, Daily      simvastatin 10 MG tablet  Commonly known as:  ZOCOR   10 mg, Oral, Nightly      TRELEGY ELLIPTA 100-62.5-25 MCG/INH aerosol powder   Generic drug:  Fluticasone-Umeclidin-Vilant   1 puff, Inhalation, Daily         Stop These Medications    cyclobenzaprine 5 MG tablet  Commonly known as:  FLEXERIL                Consults:   Consults     Date and Time Order Name Status Description    1/7/2020 0115 Hospitalist (on-call MD unless specified) Completed           Procedures Performed:         Pertinent Test Results:     I reviewed the patient's new clinical results.    Results from last 7 days   Lab Units 01/07/20  0405 01/07/20  0034   WBC 10*3/mm3 8.80 7.80   HEMOGLOBIN g/dL 15.0 15.9   HEMATOCRIT % 45.7 45.2   PLATELETS 10*3/mm3 222 226     Results from last 7 days   Lab Units 01/07/20  0405 01/07/20  0034   SODIUM mmol/L 139 143   POTASSIUM mmol/L 4.7 4.1   CHLORIDE mmol/L 105 104   CO2 mmol/L 23.0 27.0   BUN mg/dL 15 17   CREATININE mg/dL 0.69 0.75   CALCIUM mg/dL 10.3 10.2   BILIRUBIN mg/dL 0.2 0.2   ALK PHOS U/L 71 73   ALT (SGPT) U/L 11 12   AST (SGOT) U/L 18 19   GLUCOSE mg/dL 133* 126*     Results from last 7 days   Lab Units 01/07/20  0405   MAGNESIUM mg/dL 2.3     Lab Results   Component Value Date    CALCIUM 10.3 01/07/2020    PHOS 5.5 (H) 01/07/2020     No results found for: HGBA1C  Lab Results   Component Value Date    CHOL 139 01/07/2020    TRIG 52 01/07/2020    HDL 59 01/07/2020    LDL 70 01/07/2020     Lab Results   Component Value Date    LIPASE 25 01/07/2020           Lab Results   Lab Value Date/Time    FINALDX  08/21/2019 0941     Specimen #1 (Colon, cecum, polypectomy):    Tubular adenoma     Specimen #2 (Colon, descending, polypectomy):    Tubular adenoma     Specimen #3 (Colon, rectum, polypectomy):    Tubular adenoma and hyperplastic polyp    JPR/tkrakel          Microbiology Results  (last 10 days)     ** No results found for the last 240 hours. **          ECG/EMG Results (most recent)     Procedure Component Value Units Date/Time    ECG 12 Lead [[id number]] Collected:  01/07/20 0027     Updated:  01/07/20 0621    Narrative:       HEART RATE= 66  bpm  RR Interval= 912  ms  NY Interval= 215  ms  P Horizontal Axis= -43  deg  P Front Axis= 74  deg  QRSD Interval= 67  ms  QT Interval= 403  ms  QRS Axis= -31  deg  T Wave Axis= 59  deg  - ABNORMAL ECG -  Sinus rhythm  Borderline prolonged NY interval  Left axis deviation  Consider anteroseptal infarct  When compared with ECG of 26-Dec-2019 6:53:12,  No significant change  Electronically Signed By: Abel Jung (PENNY) 07-Jan-2020 06:20:45  Date and Time of Study: 2020-01-07 00:27:53                    Xr Chest 1 View    Result Date: 1/7/2020  There is possible new early pulmonary edema with vascular congestion. No cardiac enlargement is seen.  Electronically Signed By-Dr. Angel Alaniz MD On:1/7/2020 2:42 AM This report was finalized on [id number] by Dr. Angel Alaniz MD.      Xrays, labs reviewed personally by physician.    Condition on Discharge:    Stable    Discharge Diet:   Dietary Orders (From admission, onward)     Start     Ordered    01/07/20 0901  Diet Cardiac; Healthy Heart  Diet Effective Now     Question Answer Comment   Diet / Texture / Consistency Cardiac    Select Type: Healthy Heart        01/07/20 0901                Activity at Discharge:   Activity Instructions     Activity as Tolerated            Follow-up Appointments  Future Appointments   Date Time Provider Department Center   6/8/2020  3:00 PM KYLIE Cadet MD [facility]     Additional Instructions for the Follow-ups that You Need to Schedule     Discharge Follow-up with PCP   As directed       Currently Documented PCP:    Eugene Swanson MD    PCP Phone Number:    [phone]     Follow Up Details:  If no PCP, call MD finder at [phone]                Test Results Pending at Discharge       Risk for Readmission (LACE) Score: 3 (1/7/2020  6:00 AM)          Hi Hawthorne MD  01/07/20  4:21 PM    Stress Myoview negative for ischemia.

## 2020-01-08 ENCOUNTER — OFFICE (AMBULATORY)
Dept: URBAN - METROPOLITAN AREA CLINIC 64 | Facility: CLINIC | Age: 70
End: 2020-01-08

## 2020-01-08 VITALS — SYSTOLIC BLOOD PRESSURE: 163 MMHG | WEIGHT: 184 LBS | DIASTOLIC BLOOD PRESSURE: 99 MMHG | HEART RATE: 71 BPM

## 2020-01-08 DIAGNOSIS — R10.13 EPIGASTRIC PAIN: ICD-10-CM

## 2020-01-08 DIAGNOSIS — R11.0 NAUSEA: ICD-10-CM

## 2020-01-08 DIAGNOSIS — K30 FUNCTIONAL DYSPEPSIA: ICD-10-CM

## 2020-01-08 DIAGNOSIS — R14.0 ABDOMINAL DISTENSION (GASEOUS): ICD-10-CM

## 2020-01-08 PROCEDURE — 99214 OFFICE O/P EST MOD 30 MIN: CPT | Performed by: NURSE PRACTITIONER

## 2020-01-08 RX ORDER — ONDANSETRON 4 MG/1
12 TABLET, ORALLY DISINTEGRATING ORAL
Qty: 90 | Refills: 4 | Status: COMPLETED
Start: 2020-01-08 | End: 2022-06-01

## 2020-01-29 ENCOUNTER — ON CAMPUS - OUTPATIENT (AMBULATORY)
Dept: URBAN - METROPOLITAN AREA HOSPITAL 2 | Facility: HOSPITAL | Age: 70
End: 2020-01-29

## 2020-01-29 VITALS
HEART RATE: 77 BPM | DIASTOLIC BLOOD PRESSURE: 42 MMHG | WEIGHT: 105 LBS | SYSTOLIC BLOOD PRESSURE: 89 MMHG | OXYGEN SATURATION: 97 % | SYSTOLIC BLOOD PRESSURE: 144 MMHG | HEART RATE: 72 BPM | HEART RATE: 67 BPM | OXYGEN SATURATION: 100 % | SYSTOLIC BLOOD PRESSURE: 146 MMHG | DIASTOLIC BLOOD PRESSURE: 100 MMHG | RESPIRATION RATE: 19 BRPM | SYSTOLIC BLOOD PRESSURE: 154 MMHG | TEMPERATURE: 98 F | HEART RATE: 69 BPM | OXYGEN SATURATION: 98 % | RESPIRATION RATE: 18 BRPM | RESPIRATION RATE: 16 BRPM | SYSTOLIC BLOOD PRESSURE: 145 MMHG | DIASTOLIC BLOOD PRESSURE: 76 MMHG | OXYGEN SATURATION: 99 % | DIASTOLIC BLOOD PRESSURE: 95 MMHG | HEART RATE: 71 BPM | DIASTOLIC BLOOD PRESSURE: 83 MMHG | HEART RATE: 66 BPM | SYSTOLIC BLOOD PRESSURE: 141 MMHG

## 2020-01-29 DIAGNOSIS — R13.10 DYSPHAGIA, UNSPECIFIED: ICD-10-CM

## 2020-01-29 DIAGNOSIS — K44.9 DIAPHRAGMATIC HERNIA WITHOUT OBSTRUCTION OR GANGRENE: ICD-10-CM

## 2020-01-29 PROCEDURE — 43450 DILATE ESOPHAGUS 1/MULT PASS: CPT | Performed by: INTERNAL MEDICINE

## 2020-01-29 PROCEDURE — 43235 EGD DIAGNOSTIC BRUSH WASH: CPT | Performed by: INTERNAL MEDICINE

## 2020-01-29 RX ORDER — OMEPRAZOLE 40 MG/1
40 CAPSULE, DELAYED RELEASE ORAL
Qty: 90 | Refills: 3 | Status: COMPLETED
Start: 2015-04-09 | End: 2022-05-02

## 2020-01-30 LAB
BH CV NUCLEAR PRIOR STUDY: 3
BH CV STRESS BP STAGE 1: NORMAL
BH CV STRESS BP STAGE 2: NORMAL
BH CV STRESS BP STAGE 3: NORMAL
BH CV STRESS BP STAGE 4: NORMAL
BH CV STRESS COMMENTS STAGE 1: NORMAL
BH CV STRESS COMMENTS STAGE 2: NORMAL
BH CV STRESS DOSE REGADENOSON STAGE 1: 0.4
BH CV STRESS DURATION MIN STAGE 1: 0
BH CV STRESS DURATION MIN STAGE 2: 4
BH CV STRESS DURATION SEC STAGE 1: 10
BH CV STRESS DURATION SEC STAGE 2: 0
BH CV STRESS HR STAGE 1: 73
BH CV STRESS HR STAGE 2: 68
BH CV STRESS HR STAGE 3: 83
BH CV STRESS HR STAGE 4: 82
BH CV STRESS PROTOCOL 1: NORMAL
BH CV STRESS RECOVERY BP: NORMAL MMHG
BH CV STRESS RECOVERY HR: 78 BPM
BH CV STRESS STAGE 1: 1
BH CV STRESS STAGE 2: 2
BH CV STRESS STAGE 3: 3
BH CV STRESS STAGE 4: 4
LV EF NUC BP: 75 %
MAXIMAL PREDICTED HEART RATE: 149 BPM
PERCENT MAX PREDICTED HR: 55.7 %
STRESS BASELINE BP: NORMAL MMHG
STRESS BASELINE HR: 58 BPM
STRESS PERCENT HR: 66 %
STRESS POST PEAK BP: NORMAL MMHG
STRESS POST PEAK HR: 83 BPM
STRESS TARGET HR: 127 BPM

## 2020-01-30 PROCEDURE — 93018 CV STRESS TEST I&R ONLY: CPT | Performed by: INTERNAL MEDICINE

## 2020-01-30 PROCEDURE — 78452 HT MUSCLE IMAGE SPECT MULT: CPT | Performed by: INTERNAL MEDICINE

## 2020-02-14 ENCOUNTER — DOCUMENTATION (OUTPATIENT)
Dept: PHYSICAL THERAPY | Facility: CLINIC | Age: 70
End: 2020-02-14

## 2020-02-14 NOTE — PROGRESS NOTES
Discharge Summary  Discharge Summary from Physical/Occupational Therapy Report    Patient: Lucrecia Ramesh   : 1950  Today's Date: 2020    Patient seen for 1 visit    Discharge Status of Patient: See eval note dated 19      Comments ; pt did not return for additional treatment since her eval on 19 and will be DC from our services.      SIGNATURE: Terry Juarez PT

## 2020-03-05 RX ORDER — GABAPENTIN 300 MG/1
CAPSULE ORAL
Qty: 90 CAPSULE | Refills: 2 | Status: SHIPPED | OUTPATIENT
Start: 2020-03-05 | End: 2020-09-14

## 2020-03-05 RX ORDER — AMLODIPINE BESYLATE 5 MG/1
TABLET ORAL
Qty: 90 TABLET | Refills: 0 | Status: SHIPPED | OUTPATIENT
Start: 2020-03-05 | End: 2020-06-01

## 2020-04-30 RX ORDER — LISINOPRIL 20 MG/1
TABLET ORAL
Qty: 90 TABLET | Refills: 1 | Status: SHIPPED | OUTPATIENT
Start: 2020-04-30 | End: 2020-12-13

## 2020-05-17 ENCOUNTER — APPOINTMENT (OUTPATIENT)
Dept: GENERAL RADIOLOGY | Facility: HOSPITAL | Age: 70
End: 2020-05-17

## 2020-05-17 ENCOUNTER — HOSPITAL ENCOUNTER (OUTPATIENT)
Facility: HOSPITAL | Age: 70
Setting detail: OBSERVATION
Discharge: HOME | End: 2020-05-18
Attending: EMERGENCY MEDICINE | Admitting: HOSPITALIST

## 2020-05-17 DIAGNOSIS — R07.9 CHEST PAIN, UNSPECIFIED TYPE: Primary | ICD-10-CM

## 2020-05-17 LAB
ALBUMIN SERPL-MCNC: 4.1 G/DL (ref 3.5–5.2)
ALBUMIN/GLOB SERPL: 2.1 G/DL
ALP SERPL-CCNC: 58 U/L (ref 39–117)
ALT SERPL W P-5'-P-CCNC: 14 U/L (ref 1–33)
ANION GAP SERPL CALCULATED.3IONS-SCNC: 12 MMOL/L (ref 5–15)
AST SERPL-CCNC: 24 U/L (ref 1–32)
BASOPHILS # BLD AUTO: 0.1 10*3/MM3 (ref 0–0.2)
BASOPHILS NFR BLD AUTO: 1.2 % (ref 0–1.5)
BILIRUB SERPL-MCNC: 0.2 MG/DL (ref 0.2–1.2)
BUN BLD-MCNC: 19 MG/DL (ref 8–23)
BUN/CREAT SERPL: 21.6 (ref 7–25)
CALCIUM SPEC-SCNC: 9.3 MG/DL (ref 8.6–10.5)
CHLORIDE SERPL-SCNC: 100 MMOL/L (ref 98–107)
CO2 SERPL-SCNC: 25 MMOL/L (ref 22–29)
CREAT BLD-MCNC: 0.88 MG/DL (ref 0.57–1)
DEPRECATED RDW RBC AUTO: 42.9 FL (ref 37–54)
EOSINOPHIL # BLD AUTO: 0.3 10*3/MM3 (ref 0–0.4)
EOSINOPHIL NFR BLD AUTO: 4.8 % (ref 0.3–6.2)
ERYTHROCYTE [DISTWIDTH] IN BLOOD BY AUTOMATED COUNT: 12.8 % (ref 12.3–15.4)
GFR SERPL CREATININE-BSD FRML MDRD: 63 ML/MIN/1.73
GLOBULIN UR ELPH-MCNC: 2 GM/DL
GLUCOSE BLD-MCNC: 147 MG/DL (ref 65–99)
HCT VFR BLD AUTO: 44.1 % (ref 34–46.6)
HGB BLD-MCNC: 14.8 G/DL (ref 12–15.9)
HOLD SPECIMEN: NORMAL
HOLD SPECIMEN: NORMAL
LYMPHOCYTES # BLD AUTO: 1.2 10*3/MM3 (ref 0.7–3.1)
LYMPHOCYTES NFR BLD AUTO: 19.9 % (ref 19.6–45.3)
MCH RBC QN AUTO: 31.9 PG (ref 26.6–33)
MCHC RBC AUTO-ENTMCNC: 33.6 G/DL (ref 31.5–35.7)
MCV RBC AUTO: 95.1 FL (ref 79–97)
MONOCYTES # BLD AUTO: 0.6 10*3/MM3 (ref 0.1–0.9)
MONOCYTES NFR BLD AUTO: 9 % (ref 5–12)
NEUTROPHILS # BLD AUTO: 4.1 10*3/MM3 (ref 1.7–7)
NEUTROPHILS NFR BLD AUTO: 65.1 % (ref 42.7–76)
NRBC BLD AUTO-RTO: 0.1 /100 WBC (ref 0–0.2)
PLATELET # BLD AUTO: 215 10*3/MM3 (ref 140–450)
PMV BLD AUTO: 7 FL (ref 6–12)
POTASSIUM BLD-SCNC: 4.2 MMOL/L (ref 3.5–5.2)
PROT SERPL-MCNC: 6.1 G/DL (ref 6–8.5)
RBC # BLD AUTO: 4.64 10*6/MM3 (ref 3.77–5.28)
SARS-COV-2 RNA RESP QL NAA+PROBE: NOT DETECTED
SODIUM BLD-SCNC: 137 MMOL/L (ref 136–145)
TROPONIN T SERPL-MCNC: <0.01 NG/ML (ref 0–0.03)
TROPONIN T SERPL-MCNC: <0.01 NG/ML (ref 0–0.03)
WBC NRBC COR # BLD: 6.3 10*3/MM3 (ref 3.4–10.8)
WHOLE BLOOD HOLD SPECIMEN: NORMAL
WHOLE BLOOD HOLD SPECIMEN: NORMAL

## 2020-05-17 PROCEDURE — 93005 ELECTROCARDIOGRAM TRACING: CPT | Performed by: EMERGENCY MEDICINE

## 2020-05-17 PROCEDURE — 99285 EMERGENCY DEPT VISIT HI MDM: CPT

## 2020-05-17 PROCEDURE — 80053 COMPREHEN METABOLIC PANEL: CPT | Performed by: EMERGENCY MEDICINE

## 2020-05-17 PROCEDURE — 87635 SARS-COV-2 COVID-19 AMP PRB: CPT | Performed by: EMERGENCY MEDICINE

## 2020-05-17 PROCEDURE — 84484 ASSAY OF TROPONIN QUANT: CPT | Performed by: HOSPITALIST

## 2020-05-17 PROCEDURE — G0378 HOSPITAL OBSERVATION PER HR: HCPCS

## 2020-05-17 PROCEDURE — 84484 ASSAY OF TROPONIN QUANT: CPT | Performed by: EMERGENCY MEDICINE

## 2020-05-17 PROCEDURE — 99219 PR INITIAL OBSERVATION CARE/DAY 50 MINUTES: CPT | Performed by: HOSPITALIST

## 2020-05-17 PROCEDURE — 93005 ELECTROCARDIOGRAM TRACING: CPT

## 2020-05-17 PROCEDURE — 71045 X-RAY EXAM CHEST 1 VIEW: CPT

## 2020-05-17 PROCEDURE — 85025 COMPLETE CBC W/AUTO DIFF WBC: CPT | Performed by: EMERGENCY MEDICINE

## 2020-05-17 PROCEDURE — 99204 OFFICE O/P NEW MOD 45 MIN: CPT | Performed by: INTERNAL MEDICINE

## 2020-05-17 RX ORDER — GABAPENTIN 300 MG/1
300 CAPSULE ORAL NIGHTLY
Status: DISCONTINUED | OUTPATIENT
Start: 2020-05-17 | End: 2020-05-18 | Stop reason: HOSPADM

## 2020-05-17 RX ORDER — SODIUM CHLORIDE 0.9 % (FLUSH) 0.9 %
10 SYRINGE (ML) INJECTION AS NEEDED
Status: DISCONTINUED | OUTPATIENT
Start: 2020-05-17 | End: 2020-05-18 | Stop reason: HOSPADM

## 2020-05-17 RX ORDER — ISOSORBIDE MONONITRATE 30 MG/1
30 TABLET, EXTENDED RELEASE ORAL DAILY
Status: DISCONTINUED | OUTPATIENT
Start: 2020-05-17 | End: 2020-05-18

## 2020-05-17 RX ORDER — ASPIRIN 81 MG/1
81 TABLET, CHEWABLE ORAL DAILY
COMMUNITY

## 2020-05-17 RX ORDER — OMEPRAZOLE 40 MG/1
40 CAPSULE, DELAYED RELEASE ORAL DAILY
COMMUNITY

## 2020-05-17 RX ORDER — PANTOPRAZOLE SODIUM 40 MG/1
40 TABLET, DELAYED RELEASE ORAL EVERY MORNING
Status: DISCONTINUED | OUTPATIENT
Start: 2020-05-17 | End: 2020-05-18 | Stop reason: HOSPADM

## 2020-05-17 RX ORDER — ASPIRIN 81 MG/1
324 TABLET, CHEWABLE ORAL ONCE
Status: DISCONTINUED | OUTPATIENT
Start: 2020-05-17 | End: 2020-05-17

## 2020-05-17 RX ORDER — AMLODIPINE BESYLATE 5 MG/1
5 TABLET ORAL DAILY
Status: DISCONTINUED | OUTPATIENT
Start: 2020-05-17 | End: 2020-05-18 | Stop reason: HOSPADM

## 2020-05-17 RX ORDER — ALENDRONATE SODIUM 70 MG/1
70 TABLET ORAL
COMMUNITY
End: 2021-05-25

## 2020-05-17 RX ORDER — ASPIRIN 81 MG/1
81 TABLET, CHEWABLE ORAL DAILY
Status: DISCONTINUED | OUTPATIENT
Start: 2020-05-18 | End: 2020-05-18 | Stop reason: HOSPADM

## 2020-05-17 RX ORDER — ATORVASTATIN CALCIUM 10 MG/1
10 TABLET, FILM COATED ORAL DAILY
Status: DISCONTINUED | OUTPATIENT
Start: 2020-05-17 | End: 2020-05-18 | Stop reason: HOSPADM

## 2020-05-17 RX ADMIN — SODIUM CHLORIDE 500 ML: 0.9 INJECTION, SOLUTION INTRAVENOUS at 07:36

## 2020-05-17 RX ADMIN — ISOSORBIDE MONONITRATE 30 MG: 30 TABLET, EXTENDED RELEASE ORAL at 15:09

## 2020-05-17 RX ADMIN — PANTOPRAZOLE SODIUM 40 MG: 40 TABLET, DELAYED RELEASE ORAL at 15:09

## 2020-05-17 RX ADMIN — ATORVASTATIN CALCIUM 10 MG: 10 TABLET, FILM COATED ORAL at 15:09

## 2020-05-17 RX ADMIN — GABAPENTIN 300 MG: 300 CAPSULE ORAL at 21:29

## 2020-05-17 RX ADMIN — AMLODIPINE BESYLATE 5 MG: 5 TABLET ORAL at 15:09

## 2020-05-17 NOTE — PLAN OF CARE
Problem: Patient Care Overview  Goal: Plan of Care Review  Outcome: Ongoing (interventions implemented as appropriate)  Flowsheets (Taken 5/17/2020 0231)  Progress: no change  Plan of Care Reviewed With: patient  Outcome Summary: patient transferred to unit during afternoon. no complaints during shift. patient resting in bed. will continue to monitor.

## 2020-05-17 NOTE — PLAN OF CARE
Problem: Patient Care Overview  Goal: Plan of Care Review  Outcome: Ongoing (interventions implemented as appropriate)  Flowsheets (Taken 5/17/2020 5016)  Progress: no change  Plan of Care Reviewed With: patient  Outcome Summary: New admit- admitted for chest pain, and has a cardiac history, serial troponinsx2 done, vitals and other labs seem stable, no c/o chest pain since last night, has been SR/SB, no oxygen, covid negative. Cardiology consulted-waiting for further orders, nothing at this time, will monitor

## 2020-05-17 NOTE — H&P
[facility] Medicine Services      Patient Name: Lucrecia Ramesh  : 1950  MRN: [id number]  Primary Care Physician: Eugene Swanson MD  Date of admission: 2020    Patient Care Team:  Eugene Swanson MD as PCP - General          Subjective   History Present Illness     Chief Complaint:   Chief Complaint   Patient presents with   • Chest Pain     History of Present Illness   68 yo F with PMHx significant for COPD, HTN, HLD, tobacco use presenting with chest pain. Patient works in environmental services at an [employer], noticed she got dizzy, short of breath and developed chest pain with exertion. States it was worse with activity and relieved with rest, states it was similar to the chest pain she had in 2020. Follows with Dr. Cadet on outpatient basis. Denies any cough, fever, sore throat, or any other complaints. Does continue to smoke.     ROS  10 point review of systems completed, negative unless mentioned in HPI       Personal History     Past Medical History:   Past Medical History:   Diagnosis Date   • Allergic    • Angina pectoris (CMS/HCC)    • Arthritis    • Asthma    • Breast cyst    • COPD (chronic obstructive pulmonary disease) (CMS/HCC)    • Coronary artery disease    • Fibrocystic breast    • GERD (gastroesophageal reflux disease)    • Hyperlipidemia    • Hypertension    • Injury of back    • Stroke (CMS/HCC)     ROM slightly limited-nerve damage       Surgical History:      Past Surgical History:   Procedure Laterality Date   • BREAST CYST EXCISION     • CARDIAC CATHETERIZATION      has had 2- sees Dr. Cadet   • HYSTERECTOMY             Family History: family history includes Ovarian cancer in her daughter. Otherwise pertinent FHx was reviewed and unremarkable.     Social History:  reports that she has been smoking. She has been smoking about 1.50 packs per day. She has never used smokeless tobacco. She reports that she does not drink alcohol or use  drugs.      Medications:  Prior to Admission medications    Medication Sig Start Date End Date Taking? Authorizing Provider   alendronate (FOSAMAX) 70 MG tablet Take 70 mg by mouth Every 7 (Seven) Days. Every Wednesday   Yes Elder Narayanan MD   amLODIPine (NORVASC) 5 MG tablet TAKE 1 TABLET BY MOUTH EVERY DAY 3/5/20  Yes KYLIE Cadet MD   aspirin 81 MG chewable tablet Chew 81 mg Daily.   Yes Elder Narayanan MD   Fluticasone-Umeclidin-Vilant (TRELEGY ELLIPTA) 100-62.5-25 MCG/INH aerosol powder  Inhale 1 puff Daily.   Yes Elder Narayanan MD   gabapentin (NEURONTIN) 300 MG capsule TAKE 1 CAPSULE BY MOUTH EVERY DAY AT BEDTIME 3/5/20  Yes KYLIE Cadet MD   isosorbide mononitrate (IMDUR) 30 MG 24 hr tablet Take 30 mg by mouth Daily.   Yes Elder Narayanan MD   lisinopril (PRINIVIL,ZESTRIL) 20 MG tablet TAKE 1 TABLET BY MOUTH EVERY DAY 4/30/20  Yes KYLIE Cadet MD   meloxicam (MOBIC) 15 MG tablet Take 15 mg by mouth Daily.   Yes Elder Narayanan MD   omeprazole (priLOSEC) 40 MG capsule Take 40 mg by mouth Daily.   Yes Elder Narayanan MD   simvastatin (ZOCOR) 10 MG tablet Take 10 mg by mouth Every Night.   Yes ProviderElder MD       Allergies:    Allergies   Allergen Reactions   • Neomycin-Bacitracin Zn-Polymyx Rash   • Codeine GI Intolerance       Objective   Objective     Vital Signs  Temp:  [97.4 °F (36.3 °C)-98 °F (36.7 °C)] 98 °F (36.7 °C)  Heart Rate:  [50-72] 58  Resp:  [16-20] 18  BP: (101-126)/(56-75) 126/75  SpO2:  [92 %-100 %] 96 %  on  Flow (L/min):  [0-3] 0;   Device (Oxygen Therapy): room air  Body mass index is 21.06 kg/m².    Physical Exam   Constitutional: She is oriented to person, place, and time. She appears well-developed and well-nourished. No distress.   HENT:   Head: Normocephalic and atraumatic.   Nose: Nose normal.   Mouth/Throat: Oropharynx is clear and moist. No oropharyngeal exudate.   Eyes: Pupils are equal, round, and reactive to light.  Conjunctivae and EOM are normal. Right eye exhibits no discharge. Left eye exhibits no discharge.   Neck: Normal range of motion. Neck supple. No tracheal deviation present.   Cardiovascular: Normal rate, regular rhythm and normal heart sounds. Exam reveals no gallop and no friction rub.   No murmur heard.  Pulmonary/Chest: Effort normal and breath sounds normal. No stridor. No respiratory distress. She has no wheezes. She has no rales. She exhibits no tenderness.   Abdominal: Soft. Bowel sounds are normal. She exhibits no distension. There is no tenderness. There is no guarding.   Musculoskeletal: Normal range of motion. She exhibits no edema, tenderness or deformity.   Neurological: She is alert and oriented to person, place, and time. No cranial nerve deficit.   Skin: Skin is warm and dry. No rash noted. She is not diaphoretic. No erythema. No pallor.   Psychiatric: She has a normal mood and affect. Her behavior is normal. Judgment and thought content normal.       Results Review:  I have personally reviewed most recent cardiac tracings and lab results and agree with findings, most notably: troponin within normal limits.    Results from last 7 days   Lab Units 05/17/20  0736   WBC 10*3/mm3 6.30   HEMOGLOBIN g/dL 14.8   HEMATOCRIT % 44.1   PLATELETS 10*3/mm3 215     Results from last 7 days   Lab Units 05/17/20  0736   SODIUM mmol/L 137   POTASSIUM mmol/L 4.2   CHLORIDE mmol/L 100   CO2 mmol/L 25.0   BUN mg/dL 19   CREATININE mg/dL 0.88   GLUCOSE mg/dL 147*   CALCIUM mg/dL 9.3   ALT (SGPT) U/L 14   AST (SGOT) U/L 24   TROPONIN T ng/mL <0.010     Estimated Creatinine Clearance: 43.8 mL/min (by C-G formula based on SCr of 0.88 mg/dL).  Brief Urine Lab Results     None          Microbiology Results (last 10 days)     Procedure Component Value - Date/Time    SARS-CoV-2 PCR ([place] IN-HOUSE PERFORMED), NP SWAB IN TRANSPORT MEDIA - Swab, Nasopharynx [[id number]]  (Normal) Collected:  05/17/20 0901    Lab Status:   Final result Specimen:  Swab from Nasopharynx Updated:  05/17/20 1223     COVID19 Not Detected          ECG/EMG Results (most recent)     Procedure Component Value Units Date/Time    ECG 12 Lead [[id number]] Collected:  05/17/20 0725     Updated:  05/17/20 0728    Narrative:       HEART RATE= 70  bpm  RR Interval= 860  ms  MA Interval= 225  ms  P Horizontal Axis= -11  deg  P Front Axis= 82  deg  QRSD Interval= 65  ms  QT Interval= 407  ms  QRS Axis= -33  deg  T Wave Axis= 52  deg  - ABNORMAL ECG -  Sinus rhythm  Prolonged MA interval  Left axis deviation  Electronically Signed By:   Date and Time of Study: 2020-05-17 07:25:45                    Xr Chest 1 View    Result Date: 5/17/2020  No acute cardiopulmonary process identified.  Electronically Signed By-DR. Long Mazariegos MD On:5/17/2020 8:12 AM This report was finalized on [id number] by DR. Long Mazariegos MD.        Estimated Creatinine Clearance: 43.8 mL/min (by C-G formula based on SCr of 0.88 mg/dL).    Assessment/Plan   Assessment/Plan       Active Hospital Problems    Diagnosis  POA   • Chest pain [R07.9]  Yes      Resolved Hospital Problems   No resolved problems to display.     Chest pain- Atypical, 70 yo F with risk factors   -Consult cardiology   -Last stress test negative in 1/2020   -Consideration for stress test vs cath   -NPO after midnight   -COVID-19 obtained due to risk factors (works at [employer] [facility]), in-house testing negative    HTN  -Continue amlodipine     COPD  -Continue home inhaler     HLD  -Continue statin     CAD  -Continue aspirin   -Continue imdur     GERD  -Continue protonix       VTE Prophylaxis - SCDs.    CODE STATUS:    Code Status and Medical Interventions:   Ordered at: 05/17/20 1115     Level Of Support Discussed With:    Patient     Code Status:    CPR     Medical Interventions (Level of Support Prior to Arrest):    Full       Admission Status:  I believe this patient meets observation  criteria.      I discussed the patients  findings and my recommendations with patient and nursing staff.        Electronically signed by Whitney De lToro MD, 05/17/20, 2:14 PM.  [facility] Hospitalist Team

## 2020-05-17 NOTE — ED PROVIDER NOTES
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  She reports no chest pain now.  When EMS arrived they report a low heart rate and low blood pressure initially.  This resolved without treatment.  She reports no shortness of breath or increased cough or fever and states that she had felt well prior to this episode.  Review of Systems   Constitutional: Positive for diaphoresis. Negative for fever.   HENT: Negative.    Eyes: Negative.    Respiratory: Negative.    Cardiovascular: Positive for chest pain.   Gastrointestinal: Negative.    Genitourinary: Negative.    Musculoskeletal: Negative.    Skin: Negative.    Neurological: Negative.    Hematological: Negative.    Psychiatric/Behavioral: Negative.        Past Medical History:   Diagnosis Date   • Allergic    • Angina pectoris (CMS/HCC)    • Arthritis    • Asthma    • Breast cyst    • Fibrocystic breast    • Injury of back    • Stroke (CMS/HCC)        Allergies   Allergen Reactions   • Neomycin-Bacitracin Zn-Polymyx Rash   • Codeine GI Intolerance       Past Surgical History:   Procedure Laterality Date   • BREAST CYST EXCISION     • HYSTERECTOMY         Family History   Problem Relation Age of Onset   • Ovarian cancer Daughter        Social History     Socioeconomic History   • Marital status:      Spouse name: Not on file   • Number of children: Not on file   • Years of education: Not on file   • Highest education level: Not on file   Tobacco Use   • Smoking status: Current Every Day Smoker     Packs/day: 1.50   • Smokeless tobacco: Never Used   Substance and Sexual Activity   • Alcohol use: No     Frequency: Never   • Drug use: Never   • Sexual activity: Defer       Family history sisters with coronary  "disease and mother with congestive heart failure  Prior to Admission medications    Medication Sig Start Date End Date Taking? Authorizing Provider   amLODIPine (NORVASC) 5 MG tablet TAKE 1 TABLET BY MOUTH EVERY DAY 3/5/20   KYLIE Cadet MD   Fluticasone-Umeclidin-Vilant (TRELEGY ELLIPTA) 100-62.5-25 MCG/INH aerosol powder  Inhale 1 puff Daily.    Elder Narayanan MD   gabapentin (NEURONTIN) 300 MG capsule TAKE 1 CAPSULE BY MOUTH EVERY DAY AT BEDTIME 3/5/20   KYLIE Cadet MD   isosorbide mononitrate (IMDUR) 30 MG 24 hr tablet Take 30 mg by mouth Daily.    Elder Narayanan MD   lisinopril (PRINIVIL,ZESTRIL) 20 MG tablet TAKE 1 TABLET BY MOUTH EVERY DAY 4/30/20   KYLIE Cadet MD   meloxicam (MOBIC) 15 MG tablet Take 15 mg by mouth Daily.    ProviderElder MD   simvastatin (ZOCOR) 10 MG tablet Take 10 mg by mouth Every Night.    ProviderElder MD     /56   Pulse 51   Temp 97.7 °F (36.5 °C) (Oral)   Resp 16   Ht 149.9 cm (59\")   Wt 47.2 kg (104 lb)   SpO2 100%   BMI 21.01 kg/m²   I examined the patient using the appropriate personal protective equipment.        Objective   Physical Exam  General: Well-developed well-appearing, no acute distress, alert and appropriate  Eyes: Pupils round, sclera nonicteric  HEENT: Mucous membranes moist, no mucosal swelling  Neck: Supple, no nuchal rigidity, no lymphadenopathy  Respirations: Respirations nonlabored, equal breath sounds bilaterally, clear lungs  Heart regular rate and rhythm, no murmurs rubs or gallops,   Abdomen soft nontender nondistended, no hepatosplenomegaly, no hernia, no mass, normal bowel sounds, no CVA tenderness  Extremities no clubbing cyanosis or edema, calves are symmetric and nontender  Neuro cranial nerves grossly intact, no focal limb deficits  Psych oriented, pleasant affect  Skin no rash, brisk cap refill  Procedures           ED Course           My EKG interpretation sinus rhythm, first-degree AV block, " rate of 70     Results for orders placed or performed during the hospital encounter of 05/17/20   Comprehensive Metabolic Panel   Result Value Ref Range    Glucose 147 (H) 65 - 99 mg/dL    BUN 19 8 - 23 mg/dL    Creatinine 0.88 0.57 - 1.00 mg/dL    Sodium 137 136 - 145 mmol/L    Potassium 4.2 3.5 - 5.2 mmol/L    Chloride 100 98 - 107 mmol/L    CO2 25.0 22.0 - 29.0 mmol/L    Calcium 9.3 8.6 - 10.5 mg/dL    Total Protein 6.1 6.0 - 8.5 g/dL    Albumin 4.10 3.50 - 5.20 g/dL    ALT (SGPT) 14 1 - 33 U/L    AST (SGOT) 24 1 - 32 U/L    Alkaline Phosphatase 58 39 - 117 U/L    Total Bilirubin 0.2 0.2 - 1.2 mg/dL    eGFR Non African Amer 63 >60 mL/min/1.73    Globulin 2.0 gm/dL    A/G Ratio 2.1 g/dL    BUN/Creatinine Ratio 21.6 7.0 - 25.0    Anion Gap 12.0 5.0 - 15.0 mmol/L   Troponin   Result Value Ref Range    Troponin T <0.010 0.000 - 0.030 ng/mL   CBC Auto Differential   Result Value Ref Range    WBC 6.30 3.40 - 10.80 10*3/mm3    RBC 4.64 3.77 - 5.28 10*6/mm3    Hemoglobin 14.8 12.0 - 15.9 g/dL    Hematocrit 44.1 34.0 - 46.6 %    MCV 95.1 79.0 - 97.0 fL    MCH 31.9 26.6 - 33.0 pg    MCHC 33.6 31.5 - 35.7 g/dL    RDW 12.8 12.3 - 15.4 %    RDW-SD 42.9 37.0 - 54.0 fl    MPV 7.0 6.0 - 12.0 fL    Platelets 215 140 - 450 10*3/mm3    Neutrophil % 65.1 42.7 - 76.0 %    Lymphocyte % 19.9 19.6 - 45.3 %    Monocyte % 9.0 5.0 - 12.0 %    Eosinophil % 4.8 0.3 - 6.2 %    Basophil % 1.2 0.0 - 1.5 %    Neutrophils, Absolute 4.10 1.70 - 7.00 10*3/mm3    Lymphocytes, Absolute 1.20 0.70 - 3.10 10*3/mm3    Monocytes, Absolute 0.60 0.10 - 0.90 10*3/mm3    Eosinophils, Absolute 0.30 0.00 - 0.40 10*3/mm3    Basophils, Absolute 0.10 0.00 - 0.20 10*3/mm3    nRBC 0.1 0.0 - 0.2 /100 WBC     Xr Chest 1 View    Result Date: 5/17/2020  No acute cardiopulmonary process identified.  Electronically Signed By-DR. Long Mazariegos MD On:5/17/2020 8:12 AM This report was finalized on [id number] by DR. Long Mazariegos MD.                                MDM  Patient presents after an episode of chest pain and diaphoresis.  She is chest pain-free during the emergency room course and stable on the monitor with some asymptomatic sinus bradycardia at times in the 50s.  She received aspirin prior to arrival.  He is resting comfortably on reexamination, initial troponin is normal, EKG shows no significant ischemic changes.  She is not describing symptoms of DVT or dissection.  X-ray negative for pneumonia or pneumothorax.  Hospitalist service was paged and patient placed hospitalization for further chest pain evaluation.  Final diagnoses:   Chest pain, unspecified type            Cristobal Wilder MD  05/17/20 0822

## 2020-05-17 NOTE — NURSING NOTE
Waiting for orders, but per Dr. Del Toro the patient cannot eat until her second troponin comes back, and no nicotine patch until we know if cardiology wants a stress test. Order for second troponin put in timed at 1340, will advise MD with result and hopefully the patient will be able to eat.

## 2020-05-17 NOTE — ED NOTES
Patient reports working at [employer] as a . She reports [facility] has no confirmed cases of COVID-19 at this time.      Nita Leyva RN  05/17/20 0905

## 2020-05-17 NOTE — ED NOTES
Patient reports being at work and described an onset of chest pain between 0630 and 0700 that she rates as 8/10 with nausea and sweating. EMS notified and gave 1 nitro and 324 mg aspirin on scene that relieved chest pain. Reports prior cardiac history of blockages and is seen by Dr. Cadet. Reports taking nitroglyerin daily. No stent placements.      Nita Leyva, RN  05/17/20 7098

## 2020-05-17 NOTE — CONSULTS
Referring Provider: Specialist  Reason for Consultation: Chest pain shortness of breath    Patient Care Team:  Eugene Swanson MD as PCP - General    Chief complaint chest pain and shortness of breath    Subjective .     History of present illness:  Lucrecia Ramesh is a 69 y.o. female with history of coronary artery disease history of COPD hypertension hyperlipidemia and other medical problems presented to hospital complains of shortness of breath and chest pains.  Patient states that she is been having the symptoms especially with exertion and relieved with rest.  She also has some diaphoresis.  She also was having some cough.  She works in a nursing home.  She does not have any fever.  She was admitted and ruled out for coronavirus    Review of Systems   Constitution: Negative for fever and malaise/fatigue.   HENT: Negative for ear pain and nosebleeds.    Eyes: Negative for blurred vision and double vision.   Cardiovascular: Positive for chest pain. Negative for dyspnea on exertion and palpitations.   Respiratory: Positive for shortness of breath. Negative for cough.    Skin: Negative for rash.   Musculoskeletal: Negative for joint pain.   Gastrointestinal: Negative for abdominal pain, nausea and vomiting.   Neurological: Negative for focal weakness and headaches.   Psychiatric/Behavioral: Negative for depression. The patient is not nervous/anxious.    All other systems reviewed and are negative.      History  Past Medical History:   Diagnosis Date   • Allergic    • Angina pectoris (CMS/HCC)    • Arthritis    • Asthma    • Breast cyst    • COPD (chronic obstructive pulmonary disease) (CMS/HCC)    • Coronary artery disease    • Fibrocystic breast    • GERD (gastroesophageal reflux disease)    • Hyperlipidemia    • Hypertension    • Injury of back    • Stroke (CMS/HCC)     ROM slightly limited-nerve damage       Past Surgical History:   Procedure Laterality Date   • BREAST CYST EXCISION     • CARDIAC  CATHETERIZATION      has had 2- sees Dr. Cadet   • HYSTERECTOMY         Family History   Problem Relation Age of Onset   • Ovarian cancer Daughter        Social History     Tobacco Use   • Smoking status: Current Every Day Smoker     Packs/day: 1.50   • Smokeless tobacco: Never Used   Substance Use Topics   • Alcohol use: No     Frequency: Never   • Drug use: Never        Medications Prior to Admission   Medication Sig Dispense Refill Last Dose   • alendronate (FOSAMAX) 70 MG tablet Take 70 mg by mouth Every 7 (Seven) Days. Every Wednesday 5/13/2020 at 1600   • amLODIPine (NORVASC) 5 MG tablet TAKE 1 TABLET BY MOUTH EVERY DAY 90 tablet 0 5/16/2020 at 1600   • aspirin 81 MG chewable tablet Chew 81 mg Daily.   5/17/2020 at 0600   • Fluticasone-Umeclidin-Vilant (TRELEGY ELLIPTA) 100-62.5-25 MCG/INH aerosol powder  Inhale 1 puff Daily.   5/17/2020 at 0600   • gabapentin (NEURONTIN) 300 MG capsule TAKE 1 CAPSULE BY MOUTH EVERY DAY AT BEDTIME 90 capsule 2 5/16/2020 at 2100   • isosorbide mononitrate (IMDUR) 30 MG 24 hr tablet Take 30 mg by mouth Daily.   5/16/2020 at 1600   • lisinopril (PRINIVIL,ZESTRIL) 20 MG tablet TAKE 1 TABLET BY MOUTH EVERY DAY 90 tablet 1 5/16/2020 at 1600   • meloxicam (MOBIC) 15 MG tablet Take 15 mg by mouth Daily.   5/16/2020 at 1600   • omeprazole (priLOSEC) 40 MG capsule Take 40 mg by mouth Daily.   5/16/2020 at 1600   • simvastatin (ZOCOR) 10 MG tablet Take 10 mg by mouth Every Night.   5/16/2020 at 1600         Neomycin-bacitracin zn-polymyx and Codeine    Scheduled Meds:    amLODIPine 5 mg Oral Daily   [START ON 5/18/2020] aspirin 81 mg Oral Daily   atorvastatin 10 mg Oral Daily   gabapentin 300 mg Oral Nightly   isosorbide mononitrate 30 mg Oral Daily   pantoprazole 40 mg Oral QAM   [START ON 5/18/2020] Fluticasone-Umeclidin-Vilant 1 puff Does not apply Daily     Continuous Infusions:   PRN Meds:.sodium chloride    Objective     VITAL SIGNS  Vitals:    05/17/20 0924 05/17/20 1054  "05/17/20 1417 05/17/20 1525   BP: 121/73 126/75 149/75 138/76   BP Location: Right arm Right arm Left arm Left arm   Patient Position: Lying Lying Lying Lying   Pulse: 51 58 60 58   Resp: 20 18 15 14   Temp: 97.4 °F (36.3 °C) 98 °F (36.7 °C) 98.1 °F (36.7 °C) 97.8 °F (36.6 °C)   TempSrc: Oral Oral Oral Oral   SpO2: 97% 96% 96% 94%   Weight:       Height:           Flowsheet Rows      First Filed Value   Admission Height  149.9 cm (59\") Documented at 05/17/2020 0720   Admission Weight  47.2 kg (104 lb) Documented at 05/17/2020 0720           TELEMETRY: Sinus rhythm with nonspecific ST segment abnormality    Physical Exam:  Physical Exam   Constitutional: She appears well-developed and well-nourished.   HENT:   Head: Normocephalic and atraumatic.   Eyes: Pupils are equal, round, and reactive to light. Conjunctivae and EOM are normal. No scleral icterus.   Neck: Normal range of motion. Neck supple. No JVD present. Carotid bruit is not present.   Cardiovascular: Normal rate, regular rhythm, S1 normal, S2 normal, normal heart sounds and intact distal pulses. PMI is not displaced.   Pulmonary/Chest: Effort normal and breath sounds normal. She has no wheezes. She has no rales.   Abdominal: Soft. Bowel sounds are normal.   Musculoskeletal: Normal range of motion.   Neurological: She is alert. She has normal strength.   No focal deficits   Skin: Skin is warm and dry. No rash noted.   Psychiatric: She has a normal mood and affect.        Results Review:   I reviewed the patient's new clinical results.  Lab Results (last 24 hours)     Procedure Component Value Units Date/Time    Troponin [[id number]]  (Normal) Collected:  05/17/20 1432    Specimen:  Blood Updated:  05/17/20 1546     Troponin T <0.010 ng/mL     Narrative:       Troponin T Reference Range:  <= 0.03 ng/mL-   Negative for AMI  >0.03 ng/mL-     Abnormal for myocardial necrosis.  Clinicians would have to utilize clinical acumen, EKG, Troponin and serial changes to " determine if it is an Acute Myocardial Infarction or myocardial injury due to an underlying chronic condition.       Results may be falsely decreased if patient taking Biotin.      SARS-CoV-2 PCR ([facility] IN-HOUSE PERFORMED), NP SWAB IN TRANSPORT MEDIA - Swab, Nasopharynx [[id number]]  (Normal) Collected:  05/17/20 0901    Specimen:  Swab from Nasopharynx Updated:  05/17/20 1223     COVID19 Not Detected    [facility] Draw [[id number]] Collected:  05/17/20 0736    Specimen:  Blood Updated:  05/17/20 0845    Narrative:       The following orders were created for panel order [facility] Draw.  Procedure                               Abnormality         Status                     ---------                               -----------         ------                     Light Blue Top[[id number]]                                   Final result               Green Top (Gel)[[id number]]                                  Final result               Lavender Top[[id number]]                                     Final result               Gold Top - SST[[id number]]                                   Final result                 Please view results for these tests on the individual orders.    Light Blue Top [[id number]] Collected:  05/17/20 0736    Specimen:  Blood Updated:  05/17/20 0845     Extra Tube hold for add-on     Comment: Auto resulted       Green Top (Gel) [[id number]] Collected:  05/17/20 0736    Specimen:  Blood Updated:  05/17/20 0845     Extra Tube Hold for add-ons.     Comment: Auto resulted.       Lavender Top [[id number]] Collected:  05/17/20 0736    Specimen:  Blood Updated:  05/17/20 0845     Extra Tube hold for add-on     Comment: Auto resulted       Gold Top - SST [[id number]] Collected:  05/17/20 0736    Specimen:  Blood Updated:  05/17/20 0845     Extra Tube Hold for add-ons.     Comment: Auto resulted.       Comprehensive Metabolic Panel [[id number]]  (Abnormal) Collected:  05/17/20 0736    Specimen:  Blood Updated:  05/17/20  0806     Glucose 147 mg/dL      BUN 19 mg/dL      Creatinine 0.88 mg/dL      Sodium 137 mmol/L      Potassium 4.2 mmol/L      Chloride 100 mmol/L      CO2 25.0 mmol/L      Calcium 9.3 mg/dL      Total Protein 6.1 g/dL      Albumin 4.10 g/dL      ALT (SGPT) 14 U/L      AST (SGOT) 24 U/L      Alkaline Phosphatase 58 U/L      Total Bilirubin 0.2 mg/dL      eGFR Non African Amer 63 mL/min/1.73      Globulin 2.0 gm/dL      A/G Ratio 2.1 g/dL      BUN/Creatinine Ratio 21.6     Anion Gap 12.0 mmol/L     Narrative:       GFR Normal >60  Chronic Kidney Disease <60  Kidney Failure <15      Troponin [[id number]]  (Normal) Collected:  05/17/20 0736    Specimen:  Blood Updated:  05/17/20 0806     Troponin T <0.010 ng/mL     Narrative:       Troponin T Reference Range:  <= 0.03 ng/mL-   Negative for AMI  >0.03 ng/mL-     Abnormal for myocardial necrosis.  Clinicians would have to utilize clinical acumen, EKG, Troponin and serial changes to determine if it is an Acute Myocardial Infarction or myocardial injury due to an underlying chronic condition.       Results may be falsely decreased if patient taking Biotin.      CBC & Differential [[id number]] Collected:  05/17/20 0736    Specimen:  Blood Updated:  05/17/20 0746    Narrative:       The following orders were created for panel order CBC & Differential.  Procedure                               Abnormality         Status                     ---------                               -----------         ------                     CBC Auto Differential[[id number]]        Normal              Final result                 Please view results for these tests on the individual orders.    CBC Auto Differential [[id number]]  (Normal) Collected:  05/17/20 0736    Specimen:  Blood Updated:  05/17/20 0746     WBC 6.30 10*3/mm3      RBC 4.64 10*6/mm3      Hemoglobin 14.8 g/dL      Hematocrit 44.1 %      MCV 95.1 fL      MCH 31.9 pg      MCHC 33.6 g/dL      RDW 12.8 %      RDW-SD 42.9 fl      MPV  7.0 fL      Platelets 215 10*3/mm3      Neutrophil % 65.1 %      Lymphocyte % 19.9 %      Monocyte % 9.0 %      Eosinophil % 4.8 %      Basophil % 1.2 %      Neutrophils, Absolute 4.10 10*3/mm3      Lymphocytes, Absolute 1.20 10*3/mm3      Monocytes, Absolute 0.60 10*3/mm3      Eosinophils, Absolute 0.30 10*3/mm3      Basophils, Absolute 0.10 10*3/mm3      nRBC 0.1 /100 WBC           Imaging Results (Last 24 Hours)     Procedure Component Value Units Date/Time    XR Chest 1 View [[id number]] Collected:  05/17/20 0812     Updated:  05/17/20 0814    Narrative:       XR CHEST 1 VW-     Date of Exam: 5/17/2020 7:45 AM     Indication: Chest pain protocol.     Comparison Exams: 01/07/2020     Technique: Single AP chest radiograph     FINDINGS:  The lungs are clear, with note of a few calcified granulomas. The heart  and mediastinal contours appear normal. The pulmonary vasculature  appears normal. The osseous structures appear intact.       Impression:       No acute cardiopulmonary process identified.     Electronically Signed By-DR. Long Mazariegos MD On:5/17/2020 8:12 AM  This report was finalized on [id number] by DR. Long Mazariegos MD.          EKG      I personally viewed and interpreted the patient's EKG/Telemetry data:    ECHOCARDIOGRAM:      STRESS MYOVIEW:    CARDIAC CATHETERIZATION:    OTHER:         Assessment/Plan     Active Problems:    Chest pain  Coronary artery disease  COPD  Hypertension  Hyperlipidemia    Patient presents with chest pain and shortness of breath and he is being ruled out for MI by EKG and enzymes  Patient recently had a stress Myoview which was normal  Patient has nonobstructive disease in LAD and RCA by cardiac catheterization  Patient also has normal function  Patient will have a d-dimer and will be ruled out for pulmonary bolus and also.  Patient blood pressure are stable  Further treatment will be discussed by the primary cardiologist with the patient.    I discussed the  patients findings and my recommendations with patient and nurse    Jarek Zheng MD  05/17/20  16:28

## 2020-05-18 ENCOUNTER — READMISSION MANAGEMENT (OUTPATIENT)
Dept: CALL CENTER | Facility: HOSPITAL | Age: 70
End: 2020-05-18

## 2020-05-18 VITALS
WEIGHT: 104.28 LBS | OXYGEN SATURATION: 94 % | SYSTOLIC BLOOD PRESSURE: 152 MMHG | TEMPERATURE: 97.9 F | DIASTOLIC BLOOD PRESSURE: 80 MMHG | HEART RATE: 60 BPM | RESPIRATION RATE: 16 BRPM | BODY MASS INDEX: 21.02 KG/M2 | HEIGHT: 59 IN

## 2020-05-18 LAB — D DIMER PPP FEU-MCNC: 0.59 MCGFEU/ML (ref 0.17–0.59)

## 2020-05-18 PROCEDURE — 85379 FIBRIN DEGRADATION QUANT: CPT | Performed by: HOSPITALIST

## 2020-05-18 PROCEDURE — 99217 PR OBSERVATION CARE DISCHARGE MANAGEMENT: CPT | Performed by: HOSPITALIST

## 2020-05-18 PROCEDURE — G0378 HOSPITAL OBSERVATION PER HR: HCPCS

## 2020-05-18 PROCEDURE — 99213 OFFICE O/P EST LOW 20 MIN: CPT | Performed by: NURSE PRACTITIONER

## 2020-05-18 RX ORDER — ISOSORBIDE MONONITRATE 60 MG/1
60 TABLET, EXTENDED RELEASE ORAL DAILY
Status: DISCONTINUED | OUTPATIENT
Start: 2020-05-18 | End: 2020-05-18 | Stop reason: HOSPADM

## 2020-05-18 RX ORDER — ACETAMINOPHEN 325 MG/1
650 TABLET ORAL EVERY 6 HOURS PRN
Status: DISCONTINUED | OUTPATIENT
Start: 2020-05-18 | End: 2020-05-18 | Stop reason: HOSPADM

## 2020-05-18 RX ORDER — ISOSORBIDE MONONITRATE 60 MG/1
60 TABLET, EXTENDED RELEASE ORAL DAILY
Qty: 30 TABLET | Refills: 0 | Status: SHIPPED | OUTPATIENT
Start: 2020-05-18 | End: 2021-02-23 | Stop reason: SDUPTHER

## 2020-05-18 RX ADMIN — ACETAMINOPHEN 650 MG: 325 TABLET, FILM COATED ORAL at 04:57

## 2020-05-18 RX ADMIN — ASPIRIN 81 MG 81 MG: 81 TABLET ORAL at 11:21

## 2020-05-18 NOTE — OUTREACH NOTE
Prep Survey      Responses   [facility] facility patient discharged from?  [facility]   Is LACE score < 7 ?  Yes   Eligibility  Readm Mgmt   Discharge diagnosis  chest pain   COVID-19 Test Status  Negative   Does the patient have one of the following disease processes/diagnoses(primary or secondary)?  Other   Does the patient have Home health ordered?  No   Is there a DME ordered?  No   Prep survey completed?  Yes          Nicole Montes De Oca RN

## 2020-05-18 NOTE — PROGRESS NOTES
Discharge Planning Assessment   [facility]     Patient Name: Lucrecia Ramesh  MRN: [id number]  Today's Date: 5/18/2020    Admit Date: 5/17/2020    Discharge Needs Assessment     Row Name 05/18/20 0858       Living Environment    Lives With  child(jhonny), adult;grandchild(jhonny)    Current Living Arrangements  home/apartment/condo    Potentially Unsafe Housing Conditions  unable to assess    Primary Care Provided by  self    Provides Primary Care For  no one, unable/limited ability to care for self    Family Caregiver if Needed  child(jhonny), adult    Quality of Family Relationships  unable to assess    Able to Return to Prior Arrangements  yes       Resource/Environmental Concerns    Resource/Environmental Concerns  none    Transportation Concerns  car, none       Transition Planning    Patient/Family Anticipates Transition to  home with family    Patient/Family Anticipated Services at Transition  none    Transportation Anticipated  car, drives self;family or friend will provide       Discharge Needs Assessment    Readmission Within the Last 30 Days  no previous admission in last 30 days    Concerns to be Addressed  no discharge needs identified    Equipment Currently Used at Home  none    Anticipated Changes Related to Illness  none    Provided Post Acute Provider List?  N/A    N/A Provider List Comment  patient denies any needs for discharge        Discharge Plan     Row Name 05/18/20 0902       Plan    Plan  return home     Patient/Family in Agreement with Plan  yes talked to patient per phone due to unable to visit patient due to covid 19 crisis; patient states she is independent at home and has no dme; she states she continues to work; she has a primary md of dr ferrera; she denies any needs for discharge       Carol naegele rn  Case management  Office number [phone]  Cell phone [phone]

## 2020-05-18 NOTE — DISCHARGE SUMMARY
[facility]  DISCHARGE SUMMARY        Prepared For PCP:  Eugene Swanson MD    Patient Name: Lucrecia Ramesh  : 1950  MRN: [id number]      Date of Admission:   2020    Date of Discharge:  2020    Length of stay:  LOS: 0 days     Hospital Course     Presenting Problem:   Chest pain, unspecified type [R07.9]      Active Hospital Problems    Diagnosis  POA   • Chest pain [R07.9]  Yes   • Essential hypertension [I10]  Yes   • CAD (coronary artery disease) [I25.10]  Yes   • Hyperlipidemia [E78.5]  Yes   • Current smoker [F17.200]  Yes      Resolved Hospital Problems   No resolved problems to display.           Hospital Course:  Lucrecia Ramesh is a 69 y.o. female admitted with chest pain, underwent EKG and serial cardiac markers, came on negative.  Patient has recently stress test which was negative for ischemia, patient also has the cardiac cath in the past which have revealed nonobstructive coronary artery disease, cardiology service saw the patient did not advise any further work-up.  Patient will be tested for d-dimer, within normal limits patient will be discharged home, in a stable condition to follow with the family physician in a week time.  Patient verbalized understanding.    Spent less than 30 minutes in arranging for the discharge.          Recommendation for Outpatient Providers:     Follow-up with family physician in a week time.        Reasons For Change In Medications and Indications for New Medications:        Day of Discharge     HPI:       Vital Signs:   Temp:  [97.8 °F (36.6 °C)-98.2 °F (36.8 °C)] 97.9 °F (36.6 °C)  Heart Rate:  [58-64] 60  Resp:  [14-18] 16  BP: (105-152)/(62-80) 152/80     Physical Exam:  Physical Exam   Constitutional: She is oriented to person, place, and time. She appears well-developed and well-nourished. No distress.   HENT:   Head: Normocephalic and atraumatic.   Right Ear: External ear normal.   Left Ear: External ear  normal.   Nose: Nose normal.   Mouth/Throat: Oropharynx is clear and moist. No oropharyngeal exudate.   Eyes: Pupils are equal, round, and reactive to light. Conjunctivae and EOM are normal. Right eye exhibits no discharge. Left eye exhibits no discharge. No scleral icterus.   Neck: Normal range of motion. No JVD present. No tracheal deviation present. No thyromegaly present.   Cardiovascular: Normal rate, regular rhythm, normal heart sounds and intact distal pulses. Exam reveals no gallop and no friction rub.   No murmur heard.  Pulmonary/Chest: Effort normal and breath sounds normal. No stridor. No respiratory distress. She has no wheezes. She has no rales. She exhibits no tenderness.   Abdominal: Soft. Bowel sounds are normal. She exhibits no distension and no mass. There is no tenderness. There is no rebound and no guarding. No hernia.   Musculoskeletal: Normal range of motion. She exhibits no edema, tenderness or deformity.   Lymphadenopathy:     She has no cervical adenopathy.   Neurological: She is alert and oriented to person, place, and time. No cranial nerve deficit or sensory deficit. She exhibits normal muscle tone. Coordination normal.   Skin: Skin is warm and dry. No rash noted. She is not diaphoretic. No erythema.   Psychiatric: She has a normal mood and affect. Her behavior is normal.   Nursing note and vitals reviewed.      Pertinent  and/or Most Recent Results     Results from last 7 days   Lab Units 05/17/20  0736   WBC 10*3/mm3 6.30   HEMOGLOBIN g/dL 14.8   HEMATOCRIT % 44.1   PLATELETS 10*3/mm3 215   SODIUM mmol/L 137   POTASSIUM mmol/L 4.2   CHLORIDE mmol/L 100   CO2 mmol/L 25.0   BUN mg/dL 19   CREATININE mg/dL 0.88   GLUCOSE mg/dL 147*   CALCIUM mg/dL 9.3     Results from last 7 days   Lab Units 05/17/20  0736   BILIRUBIN mg/dL 0.2   ALK PHOS U/L 58   ALT (SGPT) U/L 14   AST (SGOT) U/L 24           Invalid input(s): TG, LDLCALC, LDLREALC  Results from last 7 days   Lab Units 05/17/20  1432  05/17/20  0736   TROPONIN T ng/mL <0.010 <0.010       Brief Urine Lab Results     None          Microbiology Results Abnormal     Procedure Component Value - Date/Time    SARS-CoV-2 PCR ([facility] IN-HOUSE PERFORMED), NP SWAB IN TRANSPORT MEDIA - Swab, Nasopharynx [[id number]]  (Normal) Collected:  05/17/20 0901    Lab Status:  Final result Specimen:  Swab from Nasopharynx Updated:  05/17/20 1223     COVID19 Not Detected          Xr Chest 1 View    Result Date: 5/17/2020  Impression: No acute cardiopulmonary process identified.  Electronically Signed By-DR. Long Mazariegos MD On:5/17/2020 8:12 AM This report was finalized on [id number] by DR. Logn Mazariegos MD.                             Test Results Pending at Discharge        Procedures Performed           Consults:   Consults     Date and Time Order Name Status Description    5/17/2020 1115 Inpatient Cardiology Consult Completed     5/17/2020 0813 Hospitalist (on-call MD unless specified) Completed             Discharge Details        Discharge Medications      Continue These Medications      Instructions Start Date   alendronate 70 MG tablet  Commonly known as:  FOSAMAX   70 mg, Oral, Every 7 Days, Every Wednesday      amLODIPine 5 MG tablet  Commonly known as:  NORVASC   TAKE 1 TABLET BY MOUTH EVERY DAY      aspirin 81 MG chewable tablet   81 mg, Oral, Daily      gabapentin 300 MG capsule  Commonly known as:  NEURONTIN   TAKE 1 CAPSULE BY MOUTH EVERY DAY AT BEDTIME      isosorbide mononitrate 30 MG 24 hr tablet  Commonly known as:  IMDUR   30 mg, Oral, Daily      lisinopril 20 MG tablet  Commonly known as:  PRINIVIL,ZESTRIL   TAKE 1 TABLET BY MOUTH EVERY DAY      meloxicam 15 MG tablet  Commonly known as:  MOBIC   15 mg, Oral, Daily      omeprazole 40 MG capsule  Commonly known as:  priLOSEC   40 mg, Oral, Daily      simvastatin 10 MG tablet  Commonly known as:  ZOCOR   10 mg, Oral, Nightly      Trelegy Ellipta 100-62.5-25 MCG/INH aerosol powder      Generic drug:  Fluticasone-Umeclidin-Vilant   1 puff, Inhalation, Daily             Allergies   Allergen Reactions   • Neomycin-Bacitracin Zn-Polymyx Rash   • Codeine GI Intolerance         Discharge Disposition:  Home or Self Care    Diet:  Hospital:  Diet Order   Procedures   • NPO Diet         Discharge Activity:         CODE STATUS:    Code Status and Medical Interventions:   Ordered at: 05/17/20 1115     Level Of Support Discussed With:    Patient     Code Status:    CPR     Medical Interventions (Level of Support Prior to Arrest):    Full         Follow-up Appointments  Future Appointments   Date Time Provider Department Center   6/8/2020  3:00 PM KYLIE Cadet MD [facility]       Additional Instructions for the Follow-ups that You Need to Schedule     Discharge Follow-up with PCP   As directed       Currently Documented PCP:    Eugene Swanson MD    PCP Phone Number:    [phone]     Follow Up Details:  one week time.         Discharge Follow-up with Specified Provider: Cardiology serivce in one week time.; 1 Week   As directed      To:  Cardiology serivce in one week time.    Follow Up:  1 Week                 Condition on Discharge:      Stable          Electronically signed by Fernanda Dick MD, 05/18/20, 10:15 AM.      Time: I spent  25  minutes on this discharge activity which included face-to-face encounter with the patient/reviewing the data in the system/coordination of the care with the nursing staff as well as consultants/documentation/entering orders.

## 2020-05-18 NOTE — PROGRESS NOTES
CARDIOLOGY FOLLOW-UP PROGRESS NOTE      Reason for follow-up:    Chest Pain     Attending: Fernanda Dick MD      Subjective .     Denies recurrent chest pain or dyspnea     Review of Systems   Constitution: Negative for decreased appetite and diaphoresis.   HENT: Negative for congestion, hearing loss and nosebleeds.    Cardiovascular: Negative for chest pain, claudication, dyspnea on exertion, irregular heartbeat, leg swelling, near-syncope, orthopnea, palpitations, paroxysmal nocturnal dyspnea and syncope.   Respiratory: Negative for cough, shortness of breath and sleep disturbances due to breathing.    Endocrine: Negative for polyuria.   Hematologic/Lymphatic: Does not bruise/bleed easily.   Skin: Negative for itching and rash.   Musculoskeletal: Negative for back pain, muscle weakness and myalgias.   Gastrointestinal: Negative for abdominal pain, change in bowel habit and nausea.   Genitourinary: Negative for dysuria, flank pain, frequency and hesitancy.   Neurological: Negative for dizziness, tremors and weakness.   Psychiatric/Behavioral: Negative for altered mental status. The patient does not have insomnia.        Allergies: Neomycin-bacitracin zn-polymyx and Codeine    Scheduled Meds:  amLODIPine 5 mg Oral Daily   aspirin 81 mg Oral Daily   atorvastatin 10 mg Oral Daily   gabapentin 300 mg Oral Nightly   isosorbide mononitrate 30 mg Oral Daily   pantoprazole 40 mg Oral QAM   Fluticasone-Umeclidin-Vilant 1 puff Does not apply Daily       Continuous Infusions:     PRN Meds:.•  acetaminophen  •  sodium chloride    Objective     VITAL SIGNS  Patient Vitals for the past 24 hrs:   BP Temp Temp src Pulse Resp SpO2   05/18/20 0430 152/80 97.9 °F (36.6 °C) Oral 60 16 94 %   05/17/20 2107 105/62 98.2 °F (36.8 °C) Oral 64 14 93 %   05/17/20 1525 138/76 97.8 °F (36.6 °C) Oral 58 14 94 %   05/17/20 1417 149/75 98.1 °F (36.7 °C) Oral 60 15 96 %   05/17/20 1054 126/75 98 °F (36.7 °C) Oral 58 18 96 %         "    Flowsheet Rows      First Filed Value   Admission Height  149.9 cm (59\") Documented at 05/17/2020 0720   Admission Weight  47.2 kg (104 lb) Documented at 05/17/2020 0720            Intake/Output Summary (Last 24 hours) at 5/18/2020 1042  Last data filed at 5/17/2020 1858  Gross per 24 hour   Intake 540 ml   Output --   Net 540 ml        TELEMETRY:     SR    Physical Exam:  Physical Exam   Constitutional: She is oriented to person, place, and time. She appears well-developed and well-nourished. No distress.   HENT:   Head: Normocephalic and atraumatic.   Eyes: Pupils are equal, round, and reactive to light.   Neck: Normal range of motion. Neck supple. No JVD present.   Cardiovascular: Normal rate, regular rhythm, S1 normal, S2 normal, normal heart sounds and intact distal pulses.   No murmur heard.  Pulmonary/Chest: Effort normal and breath sounds normal.   Abdominal: Soft. Normal appearance. She exhibits no distension. There is no tenderness.   Musculoskeletal: Normal range of motion. She exhibits no edema.   Neurological: She is alert and oriented to person, place, and time.   Skin: Skin is warm and dry.   Psychiatric: She has a normal mood and affect.          Results Review:   I reviewed the patient's new clinical results.    CBC    Results from last 7 days   Lab Units 05/17/20  0736   WBC 10*3/mm3 6.30   HEMOGLOBIN g/dL 14.8   PLATELETS 10*3/mm3 215     BMP Results from last 7 days   Lab Units 05/17/20  0736   SODIUM mmol/L 137   POTASSIUM mmol/L 4.2   CHLORIDE mmol/L 100   CO2 mmol/L 25.0   BUN mg/dL 19   CREATININE mg/dL 0.88   GLUCOSE mg/dL 147*     Cr Clearance Estimated Creatinine Clearance: 43.8 mL/min (by C-G formula based on SCr of 0.88 mg/dL).  Coag     HbA1C No results found for: HGBA1C  Blood Glucose No results found for: POCGLU  Infection     CMP Results from last 7 days   Lab Units 05/17/20  0736   SODIUM mmol/L 137   POTASSIUM mmol/L 4.2   CHLORIDE mmol/L 100   CO2 mmol/L 25.0   BUN mg/dL 19 "   CREATININE mg/dL 0.88   GLUCOSE mg/dL 147*   ALBUMIN g/dL 4.10   BILIRUBIN mg/dL 0.2   ALK PHOS U/L 58   AST (SGOT) U/L 24   ALT (SGPT) U/L 14     ABG      UA      OSEI  No results found for: POCMETH, POCAMPHET, POCBARBITUR, POCBENZO, POCCOCAINE, POCOPIATES, POCOXYCODO, POCPHENCYC, POCPROPOXY, POCTHC, POCTRICYC  Lysis Labs Results from last 7 days   Lab Units 05/17/20  0736   HEMOGLOBIN g/dL 14.8   PLATELETS 10*3/mm3 215   CREATININE mg/dL 0.88     Radiology(recent) Xr Chest 1 View    Result Date: 5/17/2020  No acute cardiopulmonary process identified.  Electronically Signed By-DR. Long Mazariegos MD On:5/17/2020 8:12 AM This report was finalized on [id number] by DR. Long Mazariegos MD.      Imaging Results (Last 24 Hours)     ** No results found for the last 24 hours. **          Results from last 7 days   Lab Units 05/17/20  1432   TROPONIN T ng/mL <0.010       EKG              I personally viewed and interpreted the patient's EKG/Telemetry data:        ECHOCARDIOGRAM:            STRESS MYOVIEW:   1/2020   Interpretation Summary        · Findings consistent with a normal ECG stress test.  · Myocardial perfusion imaging indicates a normal myocardial perfusion study with no evidence of ischemia.  · Impressions are consistent with a low risk study.  · Gated SPECT shows normal wall motion throughout service with LVEF quantitated at greater than 70%     Stress portion of this exam was supervised by: Michelle Graf NP         CARDIAC CATHETERIZATION:         OTHER:         Assessment/Plan            Chest pain    Essential hypertension    CAD (coronary artery disease)    Hyperlipidemia    Current smoker        ASSESSMENT:    Chest Pain:     -MI ruled out       -EKG with no acute ST/T wave abnormalities  CAD: Cath 2012: Borderline disease D-1 70-80 at ostium; LAD 40-50; RCA 30-50%      -Lexiscan myoview 1/2020 neg for ischemia  HTN stable on current Rx  Dyslipidemia    PLAN:    Patient has known CAD with an  episode of chest pain with exertion  Recent myoview negative for ischemia.   Will increase medical therapy including ASA, nitrates, CCB, statin  If recurrent chest pain would consider cardiac catheterization  Counseled re: tobacco cessation  F/U with Dr. Cadet in  2 weeks        Additional recommendations per Dr. Cadet    I discussed the patients findings and my recommendations with patient and RN                          TYLER Owen  05/18/20  10:42

## 2020-05-19 NOTE — PROGRESS NOTES
Discharge Planning Assessment   [facility]     Patient Name: Lucrecia Ramesh  MRN: [id number]  Today's Date: 5/19/2020    Admit Date: 5/17/2020          Plan    Final Discharge Disposition Code  01 - home or self-care    Final Note  return home         Carol naegele rn  Case management  Office number [phone]  Cell phone [phone]

## 2020-05-26 PROBLEM — F41.9 ANXIETY DISORDER: Status: ACTIVE | Noted: 2020-05-26

## 2020-05-26 PROBLEM — F17.200 TOBACCO DEPENDENCE SYNDROME: Status: ACTIVE | Noted: 2019-06-04

## 2020-05-26 PROBLEM — J44.9 CHRONIC OBSTRUCTIVE PULMONARY DISEASE (HCC): Status: ACTIVE | Noted: 2020-05-26

## 2020-05-26 PROBLEM — I63.9 CEREBROVASCULAR ACCIDENT (CVA) (HCC): Status: ACTIVE | Noted: 2020-05-26

## 2020-05-26 PROBLEM — I25.118 CORONARY ARTERY DISEASE OF NATIVE ARTERY OF NATIVE HEART WITH STABLE ANGINA PECTORIS (HCC): Status: ACTIVE | Noted: 2020-01-07

## 2020-05-26 PROBLEM — K21.9 GASTROESOPHAGEAL REFLUX DISEASE: Status: ACTIVE | Noted: 2020-05-26

## 2020-05-26 PROBLEM — E78.2 MIXED HYPERLIPIDEMIA: Status: ACTIVE | Noted: 2020-01-07

## 2020-05-28 ENCOUNTER — OFFICE VISIT (OUTPATIENT)
Dept: CARDIOLOGY | Facility: CLINIC | Age: 70
End: 2020-05-28

## 2020-05-28 VITALS
DIASTOLIC BLOOD PRESSURE: 68 MMHG | BODY MASS INDEX: 20.58 KG/M2 | HEIGHT: 60 IN | SYSTOLIC BLOOD PRESSURE: 118 MMHG | HEART RATE: 68 BPM | WEIGHT: 104.8 LBS | OXYGEN SATURATION: 97 %

## 2020-05-28 DIAGNOSIS — F17.200 TOBACCO DEPENDENCE SYNDROME: ICD-10-CM

## 2020-05-28 DIAGNOSIS — I25.118 CORONARY ARTERY DISEASE OF NATIVE ARTERY OF NATIVE HEART WITH STABLE ANGINA PECTORIS (HCC): ICD-10-CM

## 2020-05-28 DIAGNOSIS — I63.10 CEREBROVASCULAR ACCIDENT (CVA) DUE TO EMBOLISM OF PRECEREBRAL ARTERY (HCC): ICD-10-CM

## 2020-05-28 DIAGNOSIS — E78.2 MIXED HYPERLIPIDEMIA: ICD-10-CM

## 2020-05-28 DIAGNOSIS — I10 ESSENTIAL HYPERTENSION: Primary | Chronic | ICD-10-CM

## 2020-05-28 PROCEDURE — 99213 OFFICE O/P EST LOW 20 MIN: CPT | Performed by: INTERNAL MEDICINE

## 2020-05-28 PROCEDURE — 93000 ELECTROCARDIOGRAM COMPLETE: CPT | Performed by: INTERNAL MEDICINE

## 2020-06-01 RX ORDER — AMLODIPINE BESYLATE 5 MG/1
TABLET ORAL
Qty: 90 TABLET | Refills: 0 | Status: SHIPPED | OUTPATIENT
Start: 2020-06-01 | End: 2020-08-24

## 2020-06-19 ENCOUNTER — OFFICE (AMBULATORY)
Dept: URBAN - METROPOLITAN AREA CLINIC 64 | Facility: CLINIC | Age: 70
End: 2020-06-19

## 2020-06-19 VITALS — HEART RATE: 82 BPM | SYSTOLIC BLOOD PRESSURE: 141 MMHG | WEIGHT: 105 LBS | DIASTOLIC BLOOD PRESSURE: 87 MMHG

## 2020-06-19 DIAGNOSIS — R10.13 EPIGASTRIC PAIN: ICD-10-CM

## 2020-06-19 DIAGNOSIS — K44.9 DIAPHRAGMATIC HERNIA WITHOUT OBSTRUCTION OR GANGRENE: ICD-10-CM

## 2020-06-19 DIAGNOSIS — Z86.010 PERSONAL HISTORY OF COLONIC POLYPS: ICD-10-CM

## 2020-06-19 DIAGNOSIS — D50.9 IRON DEFICIENCY ANEMIA, UNSPECIFIED: ICD-10-CM

## 2020-06-19 PROCEDURE — 99213 OFFICE O/P EST LOW 20 MIN: CPT | Performed by: INTERNAL MEDICINE

## 2020-06-19 RX ORDER — DEXLANSOPRAZOLE 60 MG/1
60 CAPSULE, DELAYED RELEASE ORAL
Qty: 90 | Refills: 3 | Status: ACTIVE
Start: 2020-06-19

## 2020-06-19 RX ORDER — OMEPRAZOLE 40 MG/1
40 CAPSULE, DELAYED RELEASE ORAL
Qty: 90 | Refills: 3 | Status: COMPLETED
Start: 2015-04-09 | End: 2022-05-02

## 2020-06-19 RX ORDER — ONDANSETRON 4 MG/1
12 TABLET, ORALLY DISINTEGRATING ORAL
Qty: 90 | Refills: 4 | Status: COMPLETED
Start: 2020-01-08 | End: 2022-06-01

## 2020-06-28 NOTE — ASSESSMENT & PLAN NOTE
Coronary artery disease is nonobstructive per cardiac catheterization with mild disease involving LAD and RCA

## 2020-07-31 ENCOUNTER — TRANSCRIBE ORDERS (OUTPATIENT)
Dept: ADMINISTRATIVE | Facility: HOSPITAL | Age: 70
End: 2020-07-31

## 2020-07-31 DIAGNOSIS — Z12.31 BREAST CANCER SCREENING BY MAMMOGRAM: Primary | ICD-10-CM

## 2020-08-12 ENCOUNTER — HOSPITAL ENCOUNTER (OUTPATIENT)
Dept: MAMMOGRAPHY | Facility: HOSPITAL | Age: 70
Discharge: HOME | End: 2020-08-12
Admitting: FAMILY MEDICINE

## 2020-08-12 DIAGNOSIS — Z12.31 BREAST CANCER SCREENING BY MAMMOGRAM: ICD-10-CM

## 2020-08-12 PROCEDURE — 77063 BREAST TOMOSYNTHESIS BI: CPT

## 2020-08-12 PROCEDURE — 77067 SCR MAMMO BI INCL CAD: CPT

## 2020-08-24 RX ORDER — AMLODIPINE BESYLATE 5 MG/1
TABLET ORAL
Qty: 90 TABLET | Refills: 0 | Status: SHIPPED | OUTPATIENT
Start: 2020-08-24 | End: 2020-11-23

## 2020-09-14 RX ORDER — GABAPENTIN 300 MG/1
CAPSULE ORAL
Qty: 90 CAPSULE | Refills: 2 | Status: SHIPPED | OUTPATIENT
Start: 2020-09-14

## 2020-11-23 RX ORDER — AMLODIPINE BESYLATE 5 MG/1
TABLET ORAL
Qty: 90 TABLET | Refills: 0 | Status: SHIPPED | OUTPATIENT
Start: 2020-11-23 | End: 2021-02-23 | Stop reason: SDUPTHER

## 2020-12-01 ENCOUNTER — OFFICE (AMBULATORY)
Dept: URBAN - METROPOLITAN AREA CLINIC 64 | Facility: CLINIC | Age: 70
End: 2020-12-01

## 2020-12-01 VITALS — DIASTOLIC BLOOD PRESSURE: 47 MMHG | SYSTOLIC BLOOD PRESSURE: 84 MMHG | WEIGHT: 105 LBS | HEART RATE: 66 BPM

## 2020-12-01 DIAGNOSIS — R13.10 DYSPHAGIA, UNSPECIFIED: ICD-10-CM

## 2020-12-01 DIAGNOSIS — K44.9 DIAPHRAGMATIC HERNIA WITHOUT OBSTRUCTION OR GANGRENE: ICD-10-CM

## 2020-12-01 DIAGNOSIS — K59.09 OTHER CONSTIPATION: ICD-10-CM

## 2020-12-01 DIAGNOSIS — Z86.010 PERSONAL HISTORY OF COLONIC POLYPS: ICD-10-CM

## 2020-12-01 DIAGNOSIS — K21.9 GASTRO-ESOPHAGEAL REFLUX DISEASE WITHOUT ESOPHAGITIS: ICD-10-CM

## 2020-12-01 PROCEDURE — 99213 OFFICE O/P EST LOW 20 MIN: CPT | Performed by: INTERNAL MEDICINE

## 2020-12-01 RX ORDER — ONDANSETRON 4 MG/1
12 TABLET, ORALLY DISINTEGRATING ORAL
Qty: 90 | Refills: 4 | Status: COMPLETED
Start: 2020-01-08 | End: 2022-06-01

## 2020-12-01 RX ORDER — LINACLOTIDE 290 UG/1
290 CAPSULE, GELATIN COATED ORAL
Qty: 90 | Refills: 3 | Status: COMPLETED
Start: 2020-12-01 | End: 2021-09-02

## 2020-12-01 RX ORDER — OMEPRAZOLE 40 MG/1
40 CAPSULE, DELAYED RELEASE ORAL
Qty: 90 | Refills: 3 | Status: COMPLETED
Start: 2015-04-09 | End: 2022-05-02

## 2020-12-01 RX ORDER — DEXLANSOPRAZOLE 60 MG/1
60 CAPSULE, DELAYED RELEASE ORAL
Qty: 90 | Refills: 3 | Status: ACTIVE
Start: 2020-06-19

## 2020-12-11 NOTE — TELEPHONE ENCOUNTER
Last OV 5/28/20, next OV  5/25/2020, no change to Lisinopril was made. Labs done in May    Glucose   65 - 99 mg/dL 147High     BUN   8 - 23 mg/dL 19    Creatinine   0.57 - 1.00 mg/dL 0.88    Sodium   136 - 145 mmol/L 137    Potassium   3.5 - 5.2 mmol/L 4.2    Chloride   98 - 107 mmol/L 100    CO2   22.0 - 29.0 mmol/L 25.0    Calcium   8.6 - 10.5 mg/dL 9.3    Total Protein   6.0 - 8.5 g/dL 6.1    Albumin   3.50 - 5.20 g/dL 4.10    ALT (SGPT)   1 - 33 U/L 14    AST (SGOT)   1 - 32 U/L 24    Alkaline Phosphatase   39 - 117 U/L 58    Total Bilirubin   0.2 - 1.2 mg/dL 0.2    eGFR Non African Amer   >60 mL/min/1.73 63    Globulin   gm/dL 2.0    A/G Ratio   g/dL 2.1    BUN/Creatinine Ratio   7.0 - 25.0 21.6

## 2020-12-13 RX ORDER — LISINOPRIL 20 MG/1
TABLET ORAL
Qty: 90 TABLET | Refills: 1 | Status: SHIPPED | OUTPATIENT
Start: 2020-12-13 | End: 2021-05-25 | Stop reason: DRUGHIGH

## 2021-02-09 ENCOUNTER — ON CAMPUS - OUTPATIENT (AMBULATORY)
Dept: URBAN - METROPOLITAN AREA HOSPITAL 2 | Facility: HOSPITAL | Age: 71
End: 2021-02-09

## 2021-02-09 VITALS
TEMPERATURE: 98.2 F | WEIGHT: 107 LBS | SYSTOLIC BLOOD PRESSURE: 132 MMHG | HEART RATE: 83 BPM | DIASTOLIC BLOOD PRESSURE: 78 MMHG | OXYGEN SATURATION: 94 % | OXYGEN SATURATION: 96 % | HEART RATE: 90 BPM | DIASTOLIC BLOOD PRESSURE: 86 MMHG | RESPIRATION RATE: 16 BRPM | OXYGEN SATURATION: 98 % | HEART RATE: 82 BPM | SYSTOLIC BLOOD PRESSURE: 163 MMHG | DIASTOLIC BLOOD PRESSURE: 81 MMHG | SYSTOLIC BLOOD PRESSURE: 139 MMHG | DIASTOLIC BLOOD PRESSURE: 73 MMHG | RESPIRATION RATE: 18 BRPM | SYSTOLIC BLOOD PRESSURE: 156 MMHG | DIASTOLIC BLOOD PRESSURE: 92 MMHG | OXYGEN SATURATION: 99 % | OXYGEN SATURATION: 97 % | SYSTOLIC BLOOD PRESSURE: 122 MMHG | HEART RATE: 91 BPM

## 2021-02-09 DIAGNOSIS — R13.10 DYSPHAGIA, UNSPECIFIED: ICD-10-CM

## 2021-02-09 DIAGNOSIS — K44.9 DIAPHRAGMATIC HERNIA WITHOUT OBSTRUCTION OR GANGRENE: ICD-10-CM

## 2021-02-09 PROCEDURE — 43450 DILATE ESOPHAGUS 1/MULT PASS: CPT | Performed by: INTERNAL MEDICINE

## 2021-02-09 PROCEDURE — 43235 EGD DIAGNOSTIC BRUSH WASH: CPT | Performed by: INTERNAL MEDICINE

## 2021-02-24 RX ORDER — AMLODIPINE BESYLATE 5 MG/1
5 TABLET ORAL DAILY
Qty: 90 TABLET | Refills: 1 | Status: SHIPPED | OUTPATIENT
Start: 2021-02-24 | End: 2021-05-25 | Stop reason: SDUPTHER

## 2021-02-24 RX ORDER — ISOSORBIDE MONONITRATE 30 MG/1
30 TABLET, EXTENDED RELEASE ORAL DAILY
Qty: 90 TABLET | Refills: 1 | Status: SHIPPED | OUTPATIENT
Start: 2021-02-24 | End: 2021-03-19 | Stop reason: SDUPTHER

## 2021-03-11 RX ORDER — GABAPENTIN 300 MG/1
CAPSULE ORAL
Qty: 90 CAPSULE | Refills: 2 | OUTPATIENT
Start: 2021-03-11

## 2021-03-21 RX ORDER — ISOSORBIDE MONONITRATE 30 MG/1
30 TABLET, EXTENDED RELEASE ORAL DAILY
Qty: 90 TABLET | Refills: 2 | Status: SHIPPED | OUTPATIENT
Start: 2021-03-21 | End: 2022-02-16 | Stop reason: SDUPTHER

## 2021-05-25 ENCOUNTER — OFFICE VISIT (OUTPATIENT)
Dept: CARDIOLOGY | Facility: CLINIC | Age: 71
End: 2021-05-25

## 2021-05-25 VITALS
DIASTOLIC BLOOD PRESSURE: 98 MMHG | HEIGHT: 60 IN | OXYGEN SATURATION: 96 % | SYSTOLIC BLOOD PRESSURE: 160 MMHG | WEIGHT: 110 LBS | BODY MASS INDEX: 21.6 KG/M2 | HEART RATE: 69 BPM

## 2021-05-25 DIAGNOSIS — I63.10 CEREBROVASCULAR ACCIDENT (CVA) DUE TO EMBOLISM OF PRECEREBRAL ARTERY (HCC): ICD-10-CM

## 2021-05-25 DIAGNOSIS — I10 ESSENTIAL HYPERTENSION: Chronic | ICD-10-CM

## 2021-05-25 DIAGNOSIS — I25.118 CORONARY ARTERY DISEASE OF NATIVE ARTERY OF NATIVE HEART WITH STABLE ANGINA PECTORIS (HCC): Primary | ICD-10-CM

## 2021-05-25 PROCEDURE — 99214 OFFICE O/P EST MOD 30 MIN: CPT | Performed by: INTERNAL MEDICINE

## 2021-05-25 PROCEDURE — 93000 ELECTROCARDIOGRAM COMPLETE: CPT | Performed by: INTERNAL MEDICINE

## 2021-05-25 RX ORDER — DEXLANSOPRAZOLE 60 MG/1
CAPSULE, DELAYED RELEASE ORAL
COMMUNITY
Start: 2021-03-11 | End: 2023-02-22

## 2021-05-25 RX ORDER — LISINOPRIL AND HYDROCHLOROTHIAZIDE 20; 12.5 MG/1; MG/1
1 TABLET ORAL DAILY
Qty: 90 TABLET | Refills: 3 | Status: SHIPPED | OUTPATIENT
Start: 2021-05-25 | End: 2022-05-13 | Stop reason: SDUPTHER

## 2021-05-25 RX ORDER — LINACLOTIDE 290 UG/1
CAPSULE, GELATIN COATED ORAL
COMMUNITY
Start: 2021-03-12 | End: 2021-05-25 | Stop reason: ALTCHOICE

## 2021-05-25 RX ORDER — AMLODIPINE BESYLATE 10 MG/1
10 TABLET ORAL DAILY
Qty: 90 TABLET | Refills: 3 | Status: SHIPPED | OUTPATIENT
Start: 2021-05-25 | End: 2022-02-22 | Stop reason: DRUGHIGH

## 2021-05-25 RX ORDER — ONDANSETRON 4 MG/1
4 TABLET, FILM COATED ORAL EVERY 8 HOURS PRN
COMMUNITY

## 2021-05-25 NOTE — PROGRESS NOTES
Cardiology Office Visit Note      Referring physician:  Dr. Eugene Swanson    Reason For Followup: 1 year follow up    HPI:  Lucrecia Ramesh is a 70 y.o. female presents today for an annual follow up. Patient has known hx nonobstructive coronary disease involving the LAD and right coronary arteries     With  well preserved LV systolic function. Also has history of dyslipidemia, COPD, previous CVA, HTN and anxiety.  Her biggest issues lately been related upper GI tract with moderate to severe gastroesophageal reflux disease and esophageal stenosis requiring esophageal dilatation.    Unfortunately, she has continued her long-standing tobacco habit, using about one and a half packs of cigarettes daily.      At present she has modest dyspnea dyspnea, with occasional dizziness/lightheadedness/near syncope, and modest lower extremity edema.she reports some chest pain, which even she feels is largely related to her reflux disease, as it is not predictably exertion related.  She has occasional palpitations.    She reports she has been compliant with prescribed medications. .     Past Medical History:   Diagnosis Date   • Allergic    • Angina pectoris (CMS/HCC)    • Arthritis    • Asthma    • Breast cyst    • COPD (chronic obstructive pulmonary disease) (CMS/HCC)    • Coronary artery disease    • Fibrocystic breast    • GERD (gastroesophageal reflux disease)    • Hyperlipidemia    • Hypertension    • Injury of back    • Stroke (CMS/HCC)     ROM slightly limited-nerve damage       Past Surgical History:   Procedure Laterality Date   • BREAST CYST EXCISION     • CARDIAC CATHETERIZATION      has had 2- sees Dr. Cadet   • HYSTERECTOMY           Current Outpatient Medications:   •  aspirin 81 MG chewable tablet, Chew 81 mg Daily., Disp: , Rfl:   •  Dexilant 60 MG capsule, TAKE 1 CAPSULE BY MOUTH EVERY DAY AS DIRECTED, Disp: , Rfl:   •  gabapentin (NEURONTIN) 300 MG capsule, TAKE 1 CAPSULE BY MOUTH EVERY DAY AT BEDTIME, Disp: 90  capsule, Rfl: 2  •  isosorbide mononitrate (IMDUR) 30 MG 24 hr tablet, Take 1 tablet by mouth Daily., Disp: 90 tablet, Rfl: 2  •  meloxicam (MOBIC) 15 MG tablet, Take 15 mg by mouth Daily., Disp: , Rfl:   •  omeprazole (priLOSEC) 40 MG capsule, Take 40 mg by mouth Daily., Disp: , Rfl:   •  ondansetron (ZOFRAN) 4 MG tablet, Take 4 mg by mouth Every 8 (Eight) Hours As Needed for Nausea or Vomiting., Disp: , Rfl:   •  simvastatin (ZOCOR) 10 MG tablet, Take 10 mg by mouth Every Night., Disp: , Rfl:   •  amLODIPine (NORVASC) 10 MG tablet, Take 1 tablet by mouth Daily., Disp: 90 tablet, Rfl: 3  •  Fluticasone-Umeclidin-Vilant (TRELEGY ELLIPTA) 100-62.5-25 MCG/INH aerosol powder , Inhale 1 puff Daily., Disp: , Rfl:   •  lisinopril-hydrochlorothiazide (PRINZIDE,ZESTORETIC) 20-12.5 MG per tablet, Take 1 tablet by mouth Daily., Disp: 90 tablet, Rfl: 3    Social History     Socioeconomic History   • Marital status:      Spouse name: Not on file   • Number of children: Not on file   • Years of education: Not on file   • Highest education level: Not on file   Tobacco Use   • Smoking status: Current Every Day Smoker     Packs/day: 1.50   • Smokeless tobacco: Never Used   Vaping Use   • Vaping Use: Never used   Substance and Sexual Activity   • Alcohol use: No   • Drug use: Never   • Sexual activity: Defer       Family History   Problem Relation Age of Onset   • Ovarian cancer Daughter    • Heart failure Mother          Review of Systems   General: denies fever, chills, anorexia, weight loss  Eyes: denies blurring, diplopia  Ear/Nose/Throat: denies ear pain, nosebleeds, hoarseness  Cardiovascular: See HPI  Respiratory: denies excessive sputum, hemoptysis, but does have occasional wheezing  Gastrointestinal: denies nausea, vomiting, change in bowel habits, abdominal pain  Genitourinary: Denies hematuria or dysuria  Musculoskeletal: Modest generalized arthralgias, not functionally limiting  Skin: denies rashes, itching,  "suspicious lesions  Neurologic: denies focal neuro deficits  Psychiatric: denies depression, anxiety  Endocrine: denies cold intolerance, heat intolerance  Hematologic/Lymphatic: denies abnormal bruising, bleeding  Allergic/Immunologic: denies urticaria or persistent infections      Objective     Visit Vitals  /98   Pulse 69   Ht 152.4 cm (60\")   Wt 49.9 kg (110 lb)   SpO2 96%   BMI 21.48 kg/m²           Physical Exam  General:     Pleasant albeit mildly anxious/depressed, well developed,, in no acute distress.    Head:     normocephalic and atraumatic.    Eyes:    PERRL/EOM intact, conjunctiva and sclera clear with out nystagmus.    Neck:    no jvd or bruits  Chest Wall:    no deformities   Lungs:    clear bilaterally to auscultation with adequate global airflow   Heart:    non-displaced PMI; regular rate and rhythm, normal S1, S2 without murmurs, rubs, or gallops  Abdomen:  Soft, nontender without HSM  Msk:    no deformity; adequate R OM  Pulses:    pulses normal in all 4 extremities.    Extremities:    no clubbing, cyanosis, no significant lower extremity edema, though ankles are mildly puffy, currently late in the afternoon  Neurologic:    no focal sensory or motor deficits  Skin:    intact without lesions or rashes.    Psych:    alert and cooperative; mildly depressed mood and flat affect; normal attention span and concentration.            ECG 12 Lead    Date/Time: 5/25/2021 8:14 AM  Performed by: KYLIE Cadet MD  Authorized by: KYLIE Cadet MD   Comparison: compared with previous ECG from 5/28/2020  Rhythm: sinus rhythm  Rate: normal  QRS axis: left    Clinical impression: normal ECG and non-specific ECG              Assessment:   Problems Addressed this Visit        Other    Essential hypertension (Chronic)  -Marginally well-regulated based on today's office reading and home blood pressure findings  -We will up titrate amlodipine and add HCTZ to lisinopril      Coronary artery disease of native " artery of native heart with stable angina pectoris (CMS/HCC) - Primary  -Found to have nonobstructive coronary disease on last cardiac catheterization following RCA and LAD  -Currently thought to be hemodynamically stable and angina free      Cerebrovascular accident (CVA) (CMS/HCC)  -Limited to minimal residual neuropathy by clinical exam    COPD with longstanding and continued tobacco use.    Significant GERD with history of esophageal stenosis requiring dilatation January 2021        Diagnoses       Codes Comments    Coronary artery disease of native artery of native heart with stable angina pectoris (CMS/HCC)    -  Primary ICD-10-CM: I25.118  ICD-9-CM: 414.01, 413.9     Cerebrovascular accident (CVA) due to embolism of precerebral artery (CMS/HCC)     ICD-10-CM: I63.10  ICD-9-CM: 434.11     Essential hypertension     ICD-10-CM: I10  ICD-9-CM: 401.9             Plan:  Cardiac wanAsad appears to be doing reasonably well despite marginally well-regulated essential hypertension.  Consequently, I am increasing amlodipine from 5 to 10 mg p.o. daily and change in current lisinopril dose to 20 mg with 12.5 mg HCTZ.  She is to keep home BP log and report to our office if 3-4 readings or not maintained in the 130/80 range.  Per usual, she is strongly encouraged towards cessation of all tobacco products.  Otherwise, we hope to see Lucrecia for routine follow-up in 6 months or sooner if needed.    KYLIE Cadet MD  5/25/2021 20:09 EDT    This report was generated using the Dragon voice recognition system.

## 2021-06-02 ENCOUNTER — OFFICE (AMBULATORY)
Dept: URBAN - METROPOLITAN AREA CLINIC 64 | Facility: CLINIC | Age: 71
End: 2021-06-02

## 2021-06-02 VITALS — SYSTOLIC BLOOD PRESSURE: 105 MMHG | WEIGHT: 110 LBS | HEART RATE: 49 BPM | DIASTOLIC BLOOD PRESSURE: 47 MMHG

## 2021-06-02 DIAGNOSIS — R13.10 DYSPHAGIA, UNSPECIFIED: ICD-10-CM

## 2021-06-02 DIAGNOSIS — R14.0 ABDOMINAL DISTENSION (GASEOUS): ICD-10-CM

## 2021-06-02 DIAGNOSIS — D50.9 IRON DEFICIENCY ANEMIA, UNSPECIFIED: ICD-10-CM

## 2021-06-02 DIAGNOSIS — K57.30 DIVERTICULOSIS OF LARGE INTESTINE WITHOUT PERFORATION OR ABS: ICD-10-CM

## 2021-06-02 DIAGNOSIS — K59.09 OTHER CONSTIPATION: ICD-10-CM

## 2021-06-02 DIAGNOSIS — K44.9 DIAPHRAGMATIC HERNIA WITHOUT OBSTRUCTION OR GANGRENE: ICD-10-CM

## 2021-06-02 DIAGNOSIS — Z86.010 PERSONAL HISTORY OF COLONIC POLYPS: ICD-10-CM

## 2021-06-02 DIAGNOSIS — K21.9 GASTRO-ESOPHAGEAL REFLUX DISEASE WITHOUT ESOPHAGITIS: ICD-10-CM

## 2021-06-02 PROCEDURE — 99213 OFFICE O/P EST LOW 20 MIN: CPT | Performed by: INTERNAL MEDICINE

## 2021-06-02 RX ORDER — OMEPRAZOLE 40 MG/1
40 CAPSULE, DELAYED RELEASE ORAL
Qty: 90 | Refills: 3 | Status: COMPLETED
Start: 2015-04-09 | End: 2022-05-02

## 2021-06-02 RX ORDER — DEXLANSOPRAZOLE 60 MG/1
60 CAPSULE, DELAYED RELEASE ORAL
Qty: 90 | Refills: 3 | Status: ACTIVE
Start: 2020-06-19

## 2021-06-02 RX ORDER — LINACLOTIDE 290 UG/1
290 CAPSULE, GELATIN COATED ORAL
Qty: 90 | Refills: 3 | Status: COMPLETED
Start: 2020-12-01 | End: 2021-09-02

## 2021-06-09 RX ORDER — LISINOPRIL 20 MG/1
TABLET ORAL
Qty: 90 TABLET | Refills: 1 | OUTPATIENT
Start: 2021-06-09

## 2021-09-02 ENCOUNTER — OFFICE (AMBULATORY)
Dept: URBAN - METROPOLITAN AREA CLINIC 64 | Facility: CLINIC | Age: 71
End: 2021-09-02

## 2021-09-02 VITALS
SYSTOLIC BLOOD PRESSURE: 112 MMHG | HEIGHT: 60 IN | HEART RATE: 85 BPM | WEIGHT: 105 LBS | DIASTOLIC BLOOD PRESSURE: 65 MMHG

## 2021-09-02 DIAGNOSIS — K44.9 DIAPHRAGMATIC HERNIA WITHOUT OBSTRUCTION OR GANGRENE: ICD-10-CM

## 2021-09-02 DIAGNOSIS — K59.04 CHRONIC IDIOPATHIC CONSTIPATION: ICD-10-CM

## 2021-09-02 DIAGNOSIS — Z86.010 PERSONAL HISTORY OF COLONIC POLYPS: ICD-10-CM

## 2021-09-02 DIAGNOSIS — K21.9 GASTRO-ESOPHAGEAL REFLUX DISEASE WITHOUT ESOPHAGITIS: ICD-10-CM

## 2021-09-02 DIAGNOSIS — R10.13 EPIGASTRIC PAIN: ICD-10-CM

## 2021-09-02 DIAGNOSIS — R13.10 DYSPHAGIA, UNSPECIFIED: ICD-10-CM

## 2021-09-02 DIAGNOSIS — R14.0 ABDOMINAL DISTENSION (GASEOUS): ICD-10-CM

## 2021-09-02 PROCEDURE — 99213 OFFICE O/P EST LOW 20 MIN: CPT | Performed by: INTERNAL MEDICINE

## 2021-09-02 RX ORDER — DOCUSATE SODIUM 100 MG/1
CAPSULE ORAL
Qty: 180 | Refills: 11 | Status: COMPLETED
Start: 2021-09-02 | End: 2022-06-01

## 2021-09-02 RX ORDER — OMEPRAZOLE 40 MG/1
40 CAPSULE, DELAYED RELEASE ORAL
Qty: 90 | Refills: 3 | Status: COMPLETED
Start: 2015-04-09 | End: 2022-05-02

## 2021-09-02 RX ORDER — DEXLANSOPRAZOLE 60 MG/1
60 CAPSULE, DELAYED RELEASE ORAL
Qty: 90 | Refills: 3 | Status: ACTIVE
Start: 2020-06-19

## 2021-09-02 RX ORDER — ONDANSETRON 4 MG/1
12 TABLET, ORALLY DISINTEGRATING ORAL
Qty: 90 | Refills: 4 | Status: COMPLETED
Start: 2020-01-08 | End: 2022-06-01

## 2021-09-20 ENCOUNTER — HOSPITAL ENCOUNTER (EMERGENCY)
Facility: HOSPITAL | Age: 71
Discharge: HOME | End: 2021-09-21
Admitting: EMERGENCY MEDICINE
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
## 2021-09-20 DIAGNOSIS — M25.559 HIP PAIN: ICD-10-CM

## 2021-09-20 DIAGNOSIS — M54.50 ACUTE MIDLINE LOW BACK PAIN WITHOUT SCIATICA: ICD-10-CM

## 2021-09-20 PROCEDURE — 99284 EMERGENCY DEPT VISIT MOD MDM: CPT

## 2021-09-21 ENCOUNTER — APPOINTMENT (OUTPATIENT)
Dept: CT IMAGING | Facility: HOSPITAL | Age: 71
End: 2021-09-21

## 2021-09-21 VITALS
SYSTOLIC BLOOD PRESSURE: 111 MMHG | HEART RATE: 70 BPM | RESPIRATION RATE: 17 BRPM | DIASTOLIC BLOOD PRESSURE: 67 MMHG | BODY MASS INDEX: 21.6 KG/M2 | OXYGEN SATURATION: 94 % | HEIGHT: 60 IN | WEIGHT: 110 LBS | TEMPERATURE: 98 F

## 2021-09-21 PROCEDURE — 70450 CT HEAD/BRAIN W/O DYE: CPT

## 2021-09-21 PROCEDURE — 72131 CT LUMBAR SPINE W/O DYE: CPT

## 2021-09-21 PROCEDURE — 63710000001 ONDANSETRON ODT 4 MG TABLET DISPERSIBLE: Performed by: NURSE PRACTITIONER

## 2021-09-21 PROCEDURE — 72192 CT PELVIS W/O DYE: CPT

## 2021-09-21 RX ORDER — HYDROCODONE BITARTRATE AND ACETAMINOPHEN 5; 325 MG/1; MG/1
1 TABLET ORAL ONCE AS NEEDED
Status: COMPLETED | OUTPATIENT
Start: 2021-09-21 | End: 2021-09-21

## 2021-09-21 RX ORDER — MORPHINE SULFATE 4 MG/ML
4 INJECTION, SOLUTION INTRAMUSCULAR; INTRAVENOUS ONCE
Status: DISCONTINUED | OUTPATIENT
Start: 2021-09-21 | End: 2021-09-21 | Stop reason: HOSPADM

## 2021-09-21 RX ORDER — SODIUM CHLORIDE 0.9 % (FLUSH) 0.9 %
10 SYRINGE (ML) INJECTION AS NEEDED
Status: DISCONTINUED | OUTPATIENT
Start: 2021-09-21 | End: 2021-09-21 | Stop reason: HOSPADM

## 2021-09-21 RX ORDER — ONDANSETRON 4 MG/1
4 TABLET, ORALLY DISINTEGRATING ORAL ONCE
Status: COMPLETED | OUTPATIENT
Start: 2021-09-21 | End: 2021-09-21

## 2021-09-21 RX ADMIN — HYDROCODONE BITARTRATE AND ACETAMINOPHEN 1 TABLET: 5; 325 TABLET ORAL at 02:38

## 2021-09-21 RX ADMIN — ONDANSETRON 4 MG: 4 TABLET, ORALLY DISINTEGRATING ORAL at 02:38

## 2021-09-21 NOTE — ED PROVIDER NOTES
Subjective    Chief Complaint   Patient presents with   • Fall     Eugene Swanson MD  No LMP recorded. Patient has had a hysterectomy.  Allergies   Allergen Reactions   • Neomycin-Bacitracin Zn-Polymyx Rash   • Codeine GI Intolerance       History of Present Illness  Patient is a 71-year-old female presents emergency department after a fall.  Patient reports she tripped at home, landing on her back and her hip, complains of low back pain and hip pain.  She denies any her head.  No loss of consciousness.  No syncope.  No chest pain or shortness of breath.  No abdominal pain.  No nausea, vomiting or diarrhea.  He denies any neck pain.  Numbness or tingling.  No weakness.  Review of Systems   Constitutional: Negative for chills and fever.   Respiratory: Negative for cough, chest tightness and shortness of breath.    Cardiovascular: Negative for chest pain, palpitations and leg swelling.   Gastrointestinal: Negative for abdominal pain, diarrhea, nausea and vomiting.   Genitourinary: Negative for flank pain.   Musculoskeletal: Positive for back pain. Negative for neck pain.        Right hip pain   Skin: Negative for color change and rash.   Neurological: Negative for dizziness, tremors, seizures, syncope, facial asymmetry, speech difficulty, weakness, light-headedness, numbness and headaches.       Past Medical History:   Diagnosis Date   • Allergic    • Angina pectoris (CMS/HCC)    • Arthritis    • Asthma    • Breast cyst    • COPD (chronic obstructive pulmonary disease) (CMS/HCC)    • Coronary artery disease    • Fibrocystic breast    • GERD (gastroesophageal reflux disease)    • Hyperlipidemia    • Hypertension    • Injury of back    • Stroke (CMS/HCC)     ROM slightly limited-nerve damage       Allergies   Allergen Reactions   • Neomycin-Bacitracin Zn-Polymyx Rash   • Codeine GI Intolerance       Past Surgical History:   Procedure Laterality Date   • BREAST CYST EXCISION     • CARDIAC CATHETERIZATION      has had  2- sees Dr. Cadet   • HYSTERECTOMY         Family History   Problem Relation Age of Onset   • Ovarian cancer Daughter    • Heart failure Mother        Social History     Socioeconomic History   • Marital status:      Spouse name: Not on file   • Number of children: Not on file   • Years of education: Not on file   • Highest education level: Not on file   Tobacco Use   • Smoking status: Current Every Day Smoker     Packs/day: 1.50   • Smokeless tobacco: Never Used   Vaping Use   • Vaping Use: Never used   Substance and Sexual Activity   • Alcohol use: No   • Drug use: Never   • Sexual activity: Defer           Objective   Physical Exam  Vitals and nursing note reviewed.   Constitutional:       General: She is not in acute distress.     Appearance: Normal appearance. She is not ill-appearing, toxic-appearing or diaphoretic.   HENT:      Head: Normocephalic and atraumatic.      Nose: Nose normal.      Mouth/Throat:      Mouth: Mucous membranes are moist.      Pharynx: Oropharynx is clear.   Eyes:      Extraocular Movements: Extraocular movements intact.      Conjunctiva/sclera: Conjunctivae normal.      Pupils: Pupils are equal, round, and reactive to light.   Neck:      Comments: C-spine cleared with Nexus criteria.  Cardiovascular:      Rate and Rhythm: Normal rate and regular rhythm.      Heart sounds: Normal heart sounds.   Pulmonary:      Effort: Pulmonary effort is normal.      Breath sounds: Normal breath sounds.   Abdominal:      General: Bowel sounds are normal.      Palpations: Abdomen is soft.      Tenderness: There is no abdominal tenderness. There is no guarding or rebound.   Musculoskeletal:      Cervical back: Normal range of motion and neck supple. No deformity, signs of trauma, tenderness, bony tenderness or crepitus. No pain with movement. Normal range of motion.      Thoracic back: Normal. No tenderness or bony tenderness.      Lumbar back: Tenderness present. No swelling, edema, signs of  "trauma or bony tenderness.      Right hip: Tenderness present. No deformity, lacerations, bony tenderness or crepitus. Normal range of motion. Normal strength.        Legs:       Comments: No saddle anesthesia.  No visible skin changes are noted throughout the back.  Bilateral DP pulses 2+.  Sensation intact.  Cap refill brisk.   Skin:     General: Skin is warm and dry.      Capillary Refill: Capillary refill takes less than 2 seconds.      Findings: No erythema or rash.   Neurological:      General: No focal deficit present.      Mental Status: She is alert and oriented to person, place, and time.   Psychiatric:         Mood and Affect: Mood normal.         Behavior: Behavior normal.         Procedures           ED Course      /67   Pulse 70   Temp 98 °F (36.7 °C)   Resp 17   Ht 152.4 cm (60\")   Wt 49.9 kg (110 lb)   SpO2 94%   BMI 21.48 kg/m²   Labs Reviewed - No data to display  Medications   sodium chloride 0.9 % flush 10 mL (has no administration in time range)   Morphine sulfate (PF) injection 4 mg (4 mg Intravenous Not Given 9/21/21 0225)   HYDROcodone-acetaminophen (NORCO) 5-325 MG per tablet 1 tablet (1 tablet Oral Given 9/21/21 0238)   ondansetron ODT (ZOFRAN-ODT) disintegrating tablet 4 mg (4 mg Oral Given 9/21/21 0238)     No radiology results for the last day                                       MDM  Appropriate PPE was worn during the duration of the care for this patient while in the emergency department per [facility] Policy  ----Differentials: Fracture, contusion, sprain  This list is not all inclusive and does not constitute the entireity of considered causes.   ----Lab and imaging reviewd by me, imaging interpreted per radiologist and independly viewed by me: As above.  ED  Course----Patient was brought back to the emergency department room for evaluation and placed on appropriate monitoring.    Nonspecific gastric wall thickening noted on CT, however patient has no complaints of " abdominal pain, nausea, vomiting or diarrhea.  She has a nonsurgical abdomen exam.  Patient attempted to walk the first time, and reported severe pain, she initially declined morphine here in the ED.  She states she went to attempt to walk again after I spoke with her and discussed admission, with further evaluation in the morning, possible need for MRI.  For this she wanted to attempt a walking, and she did walk with assist with her family at the bedside, and stated she wanted to try to go home as she has some family issues at home as well.    ----Record Review:   Vital signs have  been reviewed. Patient is afebrile. Non toxic in appearance. Alert and oriented to person, place, time and situation.  I spoke with the patient at the bedside regarding their plan of care, discharge instruction, home care, prescriptions, and importance follow-up.  We discussed test results at the bedside, including incidental abnormal labs, radiological findings, understands need for follow-up with primary care or specialist if indicated.      Patient was made aware of indications to return to the emergency department.  Patient agrees with the current plan of care for discharge, verbalized understanding of all instructions    Pt is aware that discharge does not mean that nothing is wrong but it indicates no emergency is present and they must continue care with follow-up as given below or physician of their choice                          Final diagnoses:   Fall, initial encounter   Hip pain   Acute midline low back pain without sciatica       ED Disposition  ED Disposition     ED Disposition Condition Comment    Discharge Stable           Eugene Swanson MD  [address] [facility] [place] [address] [phone]    Schedule an appointment as soon as possible for a visit       [facility] EMERGENCY DEPARTMENT  [facility] [facility] [address] [phone]    As needed, If symptoms worsen         Medication List       No changes were made to your prescriptions during this visit.          Mar Noble, APRN  09/21/21 0247

## 2021-09-21 NOTE — DISCHARGE INSTRUCTIONS
Please follow-up with your primary care provider, call tomorrow for an appointment, if you do not have a primary care provider, please use the patient connection above to us to help establish care, please call as soon as possible.    Return the emergency department for new or worsening symptoms    Take medications as prescribed, any changes will be noted on your discharge instruction    Ice to area 3-4 times per day, 15-20 minutes at a time, do not use while sleeping or for extended periods of time.     Warm moist heat to area 3-4 times per day, 15 to 20 minutes at a time, do not use while sleeping or for extended periods of time.

## 2021-12-08 ENCOUNTER — OFFICE VISIT (OUTPATIENT)
Dept: CARDIOLOGY | Facility: CLINIC | Age: 71
End: 2021-12-08

## 2021-12-08 VITALS
RESPIRATION RATE: 18 BRPM | WEIGHT: 107.8 LBS | HEIGHT: 60 IN | DIASTOLIC BLOOD PRESSURE: 70 MMHG | SYSTOLIC BLOOD PRESSURE: 102 MMHG | HEART RATE: 80 BPM | BODY MASS INDEX: 21.17 KG/M2

## 2021-12-08 DIAGNOSIS — I73.9 CLAUDICATION (HCC): ICD-10-CM

## 2021-12-08 DIAGNOSIS — I20.8 STABLE ANGINA PECTORIS (HCC): ICD-10-CM

## 2021-12-08 DIAGNOSIS — E78.2 MIXED HYPERLIPIDEMIA: ICD-10-CM

## 2021-12-08 DIAGNOSIS — I10 ESSENTIAL HYPERTENSION: ICD-10-CM

## 2021-12-08 DIAGNOSIS — I25.118 CORONARY ARTERY DISEASE OF NATIVE ARTERY OF NATIVE HEART WITH STABLE ANGINA PECTORIS (HCC): Primary | ICD-10-CM

## 2021-12-08 DIAGNOSIS — F17.200 TOBACCO DEPENDENCE SYNDROME: ICD-10-CM

## 2021-12-08 PROCEDURE — 99214 OFFICE O/P EST MOD 30 MIN: CPT | Performed by: INTERNAL MEDICINE

## 2021-12-08 RX ORDER — DOCUSATE SODIUM 100 MG/1
200 CAPSULE, LIQUID FILLED ORAL 2 TIMES DAILY
COMMUNITY

## 2021-12-08 NOTE — PROGRESS NOTES
Cardiology Clinic Note  Neri Ugarte MD, PhD    Subjective:     Encounter Date:12/08/2021      Patient ID: Lucrecia Ramesh is a 71 y.o. female.    Chief Complaint:  Chief Complaint   Patient presents with   • Follow-up       HPI:    I the pleasure to see this 71-year-old female today, history of nonobstructive CAD 50% of the mid LAD reportedly from remote heart cath.  She does display some substernal chest discomfort episodically which is different from her heartburn.  She describes it as a pressure squeezing sensation lasting 20 to 30 minutes at a time.  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  Her medicines consist of aspirin, long-acting nitrate calcium channel blocker lisinopril HCTZ and simvastatin.  She not had labs in some time in our system goes back to May of last year and fasting lipid studies was not done since last January which demonstrated LDL of 70 HDL of 59 total cholesterol 139 at target on present medical therapy.  These needs to be repeated and she is amenable for labs.  She had a stress test about 2 years ago which demonstrated normal perfusion and EF 70%.  Echo similarly with normal LVEF 60 to 65% no significant valvular abnormality.  She continues to smoke heavily 2 packs/day was counseled to quit today but no quit date established.  No explain syncope palpitations.  She does complain of calf cramping on both sides when she is walking describing it was charley horses but could represent PVD with her heavy smoking history and other risk factors.  She is amenable for ABIs for evaluation    Historical data copied forward from previous encounters in EMR including the history, exam, and assessment/plan has been reviewed and is unchanged unless noted otherwise.    Cardiac medicines reviewed with risk, benefits, and necessity of each discussed.    Risk and benefit of cardiac testing reviewed including death heart attack stroke pain bleeding  "infection need for vascular /cardiovascular surgery were discussed and the patient     Objective:         /70 (BP Location: Left arm, Patient Position: Sitting)   Pulse 80   Resp 18   Ht 152.4 cm (60\")   Wt 48.9 kg (107 lb 12.8 oz)   BMI 21.05 kg/m²     Physical Exam  Regular rate and rhythm no rubs murmurs gallops  Clear to auscultation  Soft nontender nondistended  Radial pulses 1+ equal bilaterally  Normal cap refill  No peripheral wounds  Neurologic grossly intact  Vitals reviewed  Assessment:         Chest pain with prior known CAD in LAD distribution  2D echo for structural functional evaluation  Lexiscan stress for risk stratification  Repeat fasting lipid panel is nothing in over a year, CMP CBC and TSH as well    Return to clinic 30 days for follow-up    Further recommendation follow findings        The pleasure to be involved in this patient's cardiovascular care.  Please call with any questions or concerns  Neri Ugarte MD, PhD    Most recent EKG as reviewed and interpreted by me:  Procedures     Most recent echo as reviewed and interpreted by me:      Most recent stress test as reviewed and interpreted by me:  Results for orders placed during the hospital encounter of 01/07/20    Stress Test With Myocardial Perfusion One Day    Interpretation Summary  · Findings consistent with a normal ECG stress test.  · Myocardial perfusion imaging indicates a normal myocardial perfusion study with no evidence of ischemia.  · Impressions are consistent with a low risk study.  · Gated SPECT shows normal wall motion throughout service with LVEF quantitated at greater than 70%    Stress portion of this exam was supervised by: Michelle Graf NP      Most recent cardiac catheterization as reviewed interpreted by me:  No results found for this or any previous visit.    The following portions of the patient's history were reviewed and updated as appropriate: allergies, current medications, past family history, " past medical history, past social history, past surgical history and problem list.      ROS:  14 point review of systems negative except as mentioned above    Current Outpatient Medications:   •  amLODIPine (NORVASC) 10 MG tablet, Take 1 tablet by mouth Daily., Disp: 90 tablet, Rfl: 3  •  aspirin 81 MG chewable tablet, Chew 81 mg Daily., Disp: , Rfl:   •  Dexilant 60 MG capsule, TAKE 1 CAPSULE BY MOUTH EVERY DAY AS DIRECTED, Disp: , Rfl:   •  docusate sodium (COLACE) 100 MG capsule, Take 200 mg by mouth 2 (Two) Times a Day., Disp: , Rfl:   •  Fluticasone-Umeclidin-Vilant (TRELEGY ELLIPTA) 100-62.5-25 MCG/INH aerosol powder , Inhale 1 puff Daily., Disp: , Rfl:   •  gabapentin (NEURONTIN) 300 MG capsule, TAKE 1 CAPSULE BY MOUTH EVERY DAY AT BEDTIME, Disp: 90 capsule, Rfl: 2  •  isosorbide mononitrate (IMDUR) 30 MG 24 hr tablet, Take 1 tablet by mouth Daily., Disp: 90 tablet, Rfl: 2  •  linaclotide (LINZESS) 290 MCG capsule capsule, Take 290 mcg by mouth Every Morning Before Breakfast., Disp: , Rfl:   •  lisinopril-hydrochlorothiazide (PRINZIDE,ZESTORETIC) 20-12.5 MG per tablet, Take 1 tablet by mouth Daily., Disp: 90 tablet, Rfl: 3  •  meloxicam (MOBIC) 15 MG tablet, Take 15 mg by mouth Daily., Disp: , Rfl:   •  omeprazole (priLOSEC) 40 MG capsule, Take 40 mg by mouth Daily., Disp: , Rfl:   •  ondansetron (ZOFRAN) 4 MG tablet, Take 4 mg by mouth Every 8 (Eight) Hours As Needed for Nausea or Vomiting., Disp: , Rfl:   •  simvastatin (ZOCOR) 10 MG tablet, Take 10 mg by mouth Every Night., Disp: , Rfl:     Problem List:  Patient Active Problem List   Diagnosis   • Chest pain   • Essential hypertension   • Coronary artery disease of native artery of native heart with stable angina pectoris (HCC)   • Mixed hyperlipidemia   • Current smoker   • Anxiety disorder   • Cerebrovascular accident (CVA) (HCC)   • Chronic obstructive pulmonary disease (HCC)   • Gastroesophageal reflux disease   • Tobacco dependence syndrome     Past  Medical History:  Past Medical History:   Diagnosis Date   • Allergic    • Angina pectoris (HCC)    • Arthritis    • Asthma    • Breast cyst    • COPD (chronic obstructive pulmonary disease) (HCC)    • Coronary artery disease    • Fibrocystic breast    • GERD (gastroesophageal reflux disease)    • Hyperlipidemia    • Hypertension    • Injury of back    • Stroke (HCC)     ROM slightly limited-nerve damage     Past Surgical History:  Past Surgical History:   Procedure Laterality Date   • BREAST CYST EXCISION     • CARDIAC CATHETERIZATION      has had 2- sees Dr. Cadet   • HYSTERECTOMY       Social History:  Social History     Socioeconomic History   • Marital status:    Tobacco Use   • Smoking status: Current Every Day Smoker     Packs/day: 1.50   • Smokeless tobacco: Never Used   Vaping Use   • Vaping Use: Never used   Substance and Sexual Activity   • Alcohol use: No   • Drug use: Never   • Sexual activity: Defer     Allergies:  Allergies   Allergen Reactions   • Neomycin-Bacitracin Zn-Polymyx Rash   • Codeine GI Intolerance     Immunizations:  Immunization History   Administered Date(s) Administered   • FluLaval/Fluarix/Fluzone >6 10/17/2019   • Fluzone High Dose =>65 Years ([employer] ONLY) 09/30/2015, 08/12/2016, 01/06/2018, 10/25/2018   • Influenza Quad Vaccine (Inpatient) 10/12/2014   • Influenza, Unspecified 09/09/2017   • Pneumococcal Conjugate 13-Valent (PCV13) 06/12/2015   • Pneumococcal Polysaccharide (PPSV23) 06/16/2016, 07/15/2016   • Zostavax 06/12/2015, 09/30/2015            In-Office Procedure(s):  No orders to display        ASCVD RIsk Score::  The ASCVD Risk score ([place] ROJAS Jr., et al., 2013) failed to calculate for the following reasons:    The patient has a prior MI or stroke diagnosis    Imaging:    Results for orders placed during the hospital encounter of 05/17/20    XR Chest 1 View    Narrative  XR CHEST 1 VW-    Date of Exam: 5/17/2020 7:45 AM    Indication: Chest pain  protocol.    Comparison Exams: 01/07/2020    Technique: Single AP chest radiograph    FINDINGS:  The lungs are clear, with note of a few calcified granulomas. The heart  and mediastinal contours appear normal. The pulmonary vasculature  appears normal. The osseous structures appear intact.    Impression  No acute cardiopulmonary process identified.    Electronically Signed By-DR. Long Mazariegos MD On:5/17/2020 8:12 AM  This report was finalized on [id number] by DR. Long Mazariegos MD.       Results for orders placed during the hospital encounter of 09/20/21    CT Head Without Contrast    Narrative  EXAMINATION: CT HEAD WO CONTRAST    DATE: 9/21/2021 12:58 AM    INDICATION: Fall today with dizziness. History of prior right-sided stroke.    COMPARISON: CT brain, 5/23/2015    TECHNIQUE: Thin section noncontrast axial images were obtained through the head. Coronal reformatted images were created.  CT dose lowering techniques were used, to include: automated exposure control, adjustment for patient size, and or use of iterative  reconstruction    FINDINGS:  Intracranial contents:    Parenchymal volume is normal. No hydrocephalus.   A few foci of hypoattenuation within periventricular white matter most commonly represent chronic microangiopathy. Chronic lacunar infarct in the left thalamus. There is no midline shift or mass effect.  No hemorrhage, mass lesion, or evidence of evolving large territorial infarct.    Bones and extracranial soft tissues:    Normal calvarium.  Orbits unremarkable. The visualized paranasal sinuses are clear. No mastoid or middle ear effusions.    Impression  1.  No hemorrhage or acute intracranial abnormality.    2.  Chronic findings as above.    Electronically signed by:  Kristian Ernst  9/20/2021 11:43 PM      Results for orders placed during the hospital encounter of 09/20/21    CT Head Without Contrast    Narrative  EXAMINATION: CT HEAD WO CONTRAST    DATE: 9/21/2021 12:58  AM    INDICATION: Fall today with dizziness. History of prior right-sided stroke.    COMPARISON: CT brain, 5/23/2015    TECHNIQUE: Thin section noncontrast axial images were obtained through the head. Coronal reformatted images were created.  CT dose lowering techniques were used, to include: automated exposure control, adjustment for patient size, and or use of iterative  reconstruction    FINDINGS:  Intracranial contents:    Parenchymal volume is normal. No hydrocephalus.   A few foci of hypoattenuation within periventricular white matter most commonly represent chronic microangiopathy. Chronic lacunar infarct in the left thalamus. There is no midline shift or mass effect.  No hemorrhage, mass lesion, or evidence of evolving large territorial infarct.    Bones and extracranial soft tissues:    Normal calvarium.  Orbits unremarkable. The visualized paranasal sinuses are clear. No mastoid or middle ear effusions.    Impression  1.  No hemorrhage or acute intracranial abnormality.    2.  Chronic findings as above.    Electronically signed by:  Kristian Ernst  9/20/2021 11:43 PM      Lab Review:   No visits with results within 6 Month(s) from this visit.   Latest known visit with results is:   Admission on 05/17/2020, Discharged on 05/18/2020   Component Date Value   • Glucose 05/17/2020 147*   • BUN 05/17/2020 19    • Creatinine 05/17/2020 0.88    • Sodium 05/17/2020 137    • Potassium 05/17/2020 4.2    • Chloride 05/17/2020 100    • CO2 05/17/2020 25.0    • Calcium 05/17/2020 9.3    • Total Protein 05/17/2020 6.1    • Albumin 05/17/2020 4.10    • ALT (SGPT) 05/17/2020 14    • AST (SGOT) 05/17/2020 24    • Alkaline Phosphatase 05/17/2020 58    • Total Bilirubin 05/17/2020 0.2    • eGFR Non African Amer 05/17/2020 63    • Globulin 05/17/2020 2.0    • A/G Ratio 05/17/2020 2.1    • BUN/Creatinine Ratio 05/17/2020 21.6    • Anion Gap 05/17/2020 12.0    • Troponin T 05/17/2020 <0.010    • Extra Tube 05/17/2020 hold for  add-on    • Extra Tube 05/17/2020 Hold for add-ons.    • Extra Tube 05/17/2020 hold for add-on    • Extra Tube 05/17/2020 Hold for add-ons.    • WBC 05/17/2020 6.30    • RBC 05/17/2020 4.64    • Hemoglobin 05/17/2020 14.8    • Hematocrit 05/17/2020 44.1    • MCV 05/17/2020 95.1    • MCH 05/17/2020 31.9    • MCHC 05/17/2020 33.6    • RDW 05/17/2020 12.8    • RDW-SD 05/17/2020 42.9    • MPV 05/17/2020 7.0    • Platelets 05/17/2020 215    • Neutrophil % 05/17/2020 65.1    • Lymphocyte % 05/17/2020 19.9    • Monocyte % 05/17/2020 9.0    • Eosinophil % 05/17/2020 4.8    • Basophil % 05/17/2020 1.2    • Neutrophils, Absolute 05/17/2020 4.10    • Lymphocytes, Absolute 05/17/2020 1.20    • Monocytes, Absolute 05/17/2020 0.60    • Eosinophils, Absolute 05/17/2020 0.30    • Basophils, Absolute 05/17/2020 0.10    • nRBC 05/17/2020 0.1    • COVID19 05/17/2020 Not Detected    • Troponin T 05/17/2020 <0.010    • D-Dimer, Quantitative 05/18/2020 0.59      Recent labs reviewed and interpreted for clinical significance and application            Level of Care:           Neri Ugarte MD  12/08/21  .

## 2022-02-15 ENCOUNTER — HOSPITAL ENCOUNTER (OUTPATIENT)
Dept: CARDIOLOGY | Facility: HOSPITAL | Age: 72
Discharge: HOME | End: 2022-02-15

## 2022-02-15 ENCOUNTER — HOSPITAL ENCOUNTER (OUTPATIENT)
Dept: NUCLEAR MEDICINE | Facility: HOSPITAL | Age: 72
Discharge: HOME | End: 2022-02-15

## 2022-02-15 VITALS — WEIGHT: 105 LBS | HEIGHT: 60 IN | BODY MASS INDEX: 20.62 KG/M2

## 2022-02-15 DIAGNOSIS — I20.8 STABLE ANGINA PECTORIS: ICD-10-CM

## 2022-02-15 DIAGNOSIS — I25.118 CORONARY ARTERY DISEASE OF NATIVE ARTERY OF NATIVE HEART WITH STABLE ANGINA PECTORIS: ICD-10-CM

## 2022-02-15 DIAGNOSIS — I73.9 CLAUDICATION: ICD-10-CM

## 2022-02-15 LAB
BH CV LOWER ARTERIAL LEFT ABI RATIO: 1.06
BH CV LOWER ARTERIAL LEFT DORSALIS PEDIS SYS MAX: 122 MMHG
BH CV LOWER ARTERIAL LEFT GREAT TOE SYS MAX: 95 MMHG
BH CV LOWER ARTERIAL LEFT POST TIBIAL SYS MAX: 127 MMHG
BH CV LOWER ARTERIAL LEFT TBI RATIO: 0.79
BH CV LOWER ARTERIAL RIGHT ABI RATIO: 1.15
BH CV LOWER ARTERIAL RIGHT DORSALIS PEDIS SYS MAX: 138 MMHG
BH CV LOWER ARTERIAL RIGHT GREAT TOE SYS MAX: 183 MMHG
BH CV LOWER ARTERIAL RIGHT POST TIBIAL SYS MAX: 137 MMHG
BH CV LOWER ARTERIAL RIGHT TBI RATIO: 1.53
MAXIMAL PREDICTED HEART RATE: 147 BPM
STRESS TARGET HR: 125 BPM
UPPER ARTERIAL LEFT ARM BRACHIAL SYS MAX: 117 MMHG
UPPER ARTERIAL RIGHT ARM BRACHIAL SYS MAX: 120 MMHG

## 2022-02-15 PROCEDURE — 78452 HT MUSCLE IMAGE SPECT MULT: CPT

## 2022-02-15 PROCEDURE — A9502 TC99M TETROFOSMIN: HCPCS | Performed by: INTERNAL MEDICINE

## 2022-02-15 PROCEDURE — 93017 CV STRESS TEST TRACING ONLY: CPT

## 2022-02-15 PROCEDURE — 93306 TTE W/DOPPLER COMPLETE: CPT

## 2022-02-15 PROCEDURE — 78452 HT MUSCLE IMAGE SPECT MULT: CPT | Performed by: INTERNAL MEDICINE

## 2022-02-15 PROCEDURE — 93922 UPR/L XTREMITY ART 2 LEVELS: CPT

## 2022-02-15 PROCEDURE — 25010000002 REGADENOSON 0.4 MG/5ML SOLUTION: Performed by: INTERNAL MEDICINE

## 2022-02-15 PROCEDURE — 93018 CV STRESS TEST I&R ONLY: CPT | Performed by: INTERNAL MEDICINE

## 2022-02-15 PROCEDURE — 93306 TTE W/DOPPLER COMPLETE: CPT | Performed by: INTERNAL MEDICINE

## 2022-02-15 PROCEDURE — 0 TECHNETIUM TETROFOSMIN KIT: Performed by: INTERNAL MEDICINE

## 2022-02-15 RX ADMIN — TETROFOSMIN 1 DOSE: 1.38 INJECTION, POWDER, LYOPHILIZED, FOR SOLUTION INTRAVENOUS at 09:36

## 2022-02-15 RX ADMIN — TETROFOSMIN 1 DOSE: 1.38 INJECTION, POWDER, LYOPHILIZED, FOR SOLUTION INTRAVENOUS at 11:09

## 2022-02-15 RX ADMIN — REGADENOSON 0.4 MG: 0.08 INJECTION, SOLUTION INTRAVENOUS at 11:08

## 2022-02-16 RX ORDER — ISOSORBIDE MONONITRATE 30 MG/1
30 TABLET, EXTENDED RELEASE ORAL DAILY
Qty: 90 TABLET | Refills: 1 | Status: SHIPPED | OUTPATIENT
Start: 2022-02-16 | End: 2022-08-22

## 2022-02-22 ENCOUNTER — OFFICE VISIT (OUTPATIENT)
Dept: CARDIOLOGY | Facility: CLINIC | Age: 72
End: 2022-02-22

## 2022-02-22 VITALS
DIASTOLIC BLOOD PRESSURE: 58 MMHG | HEART RATE: 90 BPM | BODY MASS INDEX: 20.34 KG/M2 | HEIGHT: 60 IN | WEIGHT: 103.6 LBS | RESPIRATION RATE: 18 BRPM | SYSTOLIC BLOOD PRESSURE: 100 MMHG

## 2022-02-22 DIAGNOSIS — I25.118 CORONARY ARTERY DISEASE OF NATIVE ARTERY OF NATIVE HEART WITH STABLE ANGINA PECTORIS: Primary | ICD-10-CM

## 2022-02-22 DIAGNOSIS — I20.8 STABLE ANGINA PECTORIS: ICD-10-CM

## 2022-02-22 DIAGNOSIS — I10 ESSENTIAL HYPERTENSION: ICD-10-CM

## 2022-02-22 DIAGNOSIS — E78.2 MIXED HYPERLIPIDEMIA: ICD-10-CM

## 2022-02-22 DIAGNOSIS — F17.200 TOBACCO DEPENDENCE SYNDROME: ICD-10-CM

## 2022-02-22 LAB
BH CV ECHO MEAS - ACS: 1.5 CM
BH CV ECHO MEAS - AO MAX PG (FULL): 0.36 MMHG
BH CV ECHO MEAS - AO MAX PG: 4.4 MMHG
BH CV ECHO MEAS - AO MEAN PG (FULL): 0.35 MMHG
BH CV ECHO MEAS - AO MEAN PG: 2.5 MMHG
BH CV ECHO MEAS - AO ROOT AREA (BSA CORRECTED): 1.8
BH CV ECHO MEAS - AO ROOT AREA: 4.9 CM^2
BH CV ECHO MEAS - AO ROOT DIAM: 2.5 CM
BH CV ECHO MEAS - AO V2 MAX: 104.8 CM/SEC
BH CV ECHO MEAS - AO V2 MEAN: 76.8 CM/SEC
BH CV ECHO MEAS - AO V2 VTI: 22.8 CM
BH CV ECHO MEAS - AORTIC HR: 76.5 BPM
BH CV ECHO MEAS - AORTIC R-R: 0.78 SEC
BH CV ECHO MEAS - AVA(I,A): 2.5 CM^2
BH CV ECHO MEAS - AVA(I,D): 2.5 CM^2
BH CV ECHO MEAS - AVA(V,A): 2.5 CM^2
BH CV ECHO MEAS - AVA(V,D): 2.5 CM^2
BH CV ECHO MEAS - BSA(HAYCOCK): 1.4 M^2
BH CV ECHO MEAS - BSA: 1.4 M^2
BH CV ECHO MEAS - BZI_BMI: 20.5 KILOGRAMS/M^2
BH CV ECHO MEAS - BZI_METRIC_HEIGHT: 152.4 CM
BH CV ECHO MEAS - BZI_METRIC_WEIGHT: 47.6 KG
BH CV ECHO MEAS - CI(AO): 6.1 L/MIN/M^2
BH CV ECHO MEAS - CI(LVOT): 3.1 L/MIN/M^2
BH CV ECHO MEAS - CO(AO): 8.6 L/MIN
BH CV ECHO MEAS - CO(LVOT): 4.3 L/MIN
BH CV ECHO MEAS - EDV(CUBED): 70 ML
BH CV ECHO MEAS - EDV(MOD-SP4): 44.8 ML
BH CV ECHO MEAS - EDV(TEICH): 75.1 ML
BH CV ECHO MEAS - EF(CUBED): 73.2 %
BH CV ECHO MEAS - EF(MOD-BP): 56 %
BH CV ECHO MEAS - EF(MOD-SP4): 56.3 %
BH CV ECHO MEAS - EF(TEICH): 65.4 %
BH CV ECHO MEAS - ESV(CUBED): 18.8 ML
BH CV ECHO MEAS - ESV(MOD-SP4): 19.6 ML
BH CV ECHO MEAS - ESV(TEICH): 26 ML
BH CV ECHO MEAS - FS: 35.5 %
BH CV ECHO MEAS - IVS/LVPW: 0.92
BH CV ECHO MEAS - IVSD: 0.81 CM
BH CV ECHO MEAS - LA DIMENSION(2D): 2.6 CM
BH CV ECHO MEAS - LV DIASTOLIC VOL/BSA (35-75): 31.6 ML/M^2
BH CV ECHO MEAS - LV MASS(C)D: 105.1 GRAMS
BH CV ECHO MEAS - LV MASS(C)DI: 74 GRAMS/M^2
BH CV ECHO MEAS - LV MAX PG: 4 MMHG
BH CV ECHO MEAS - LV MEAN PG: 2.2 MMHG
BH CV ECHO MEAS - LV SYSTOLIC VOL/BSA (12-30): 13.8 ML/M^2
BH CV ECHO MEAS - LV V1 MAX: 100.5 CM/SEC
BH CV ECHO MEAS - LV V1 MEAN: 70.7 CM/SEC
BH CV ECHO MEAS - LV V1 VTI: 22.1 CM
BH CV ECHO MEAS - LVIDD: 4.1 CM
BH CV ECHO MEAS - LVIDS: 2.7 CM
BH CV ECHO MEAS - LVOT AREA: 2.6 CM^2
BH CV ECHO MEAS - LVOT DIAM: 1.8 CM
BH CV ECHO MEAS - LVPWD: 0.88 CM
BH CV ECHO MEAS - MR MAX PG: 87.3 MMHG
BH CV ECHO MEAS - MR MAX VEL: 467.2 CM/SEC
BH CV ECHO MEAS - MV A MAX VEL: 56.2 CM/SEC
BH CV ECHO MEAS - MV DEC SLOPE: 245.3 CM/SEC^2
BH CV ECHO MEAS - MV DEC TIME: 0.23 SEC
BH CV ECHO MEAS - MV E MAX VEL: 56.2 CM/SEC
BH CV ECHO MEAS - MV E/A: 1
BH CV ECHO MEAS - MV MAX PG: 2.4 MMHG
BH CV ECHO MEAS - MV MEAN PG: 1.1 MMHG
BH CV ECHO MEAS - MV V2 MAX: 77.2 CM/SEC
BH CV ECHO MEAS - MV V2 MEAN: 49.2 CM/SEC
BH CV ECHO MEAS - MV V2 VTI: 19.6 CM
BH CV ECHO MEAS - MVA(VTI): 2.9 CM^2
BH CV ECHO MEAS - PA MAX PG (FULL): 1.9 MMHG
BH CV ECHO MEAS - PA MAX PG: 3.3 MMHG
BH CV ECHO MEAS - PA MEAN PG (FULL): 1.2 MMHG
BH CV ECHO MEAS - PA MEAN PG: 2 MMHG
BH CV ECHO MEAS - PA V2 MAX: 90.4 CM/SEC
BH CV ECHO MEAS - PA V2 MEAN: 66.9 CM/SEC
BH CV ECHO MEAS - PA V2 VTI: 19.6 CM
BH CV ECHO MEAS - PI END-D VEL: 112.7 CM/SEC
BH CV ECHO MEAS - PI MAX PG: 9.4 MMHG
BH CV ECHO MEAS - PI MAX VEL: 153 CM/SEC
BH CV ECHO MEAS - RAP SYSTOLE: 3 MMHG
BH CV ECHO MEAS - RV MAX PG: 1.4 MMHG
BH CV ECHO MEAS - RV MEAN PG: 0.81 MMHG
BH CV ECHO MEAS - RV V1 MAX: 58.2 CM/SEC
BH CV ECHO MEAS - RV V1 MEAN: 43.4 CM/SEC
BH CV ECHO MEAS - RV V1 VTI: 13.3 CM
BH CV ECHO MEAS - RVDD: 2.2 CM
BH CV ECHO MEAS - RVSP: 28.3 MMHG
BH CV ECHO MEAS - SI(AO): 79.3 ML/M^2
BH CV ECHO MEAS - SI(CUBED): 36.1 ML/M^2
BH CV ECHO MEAS - SI(LVOT): 39.9 ML/M^2
BH CV ECHO MEAS - SI(MOD-SP4): 17.8 ML/M^2
BH CV ECHO MEAS - SI(TEICH): 34.6 ML/M^2
BH CV ECHO MEAS - SV(AO): 112.5 ML
BH CV ECHO MEAS - SV(CUBED): 51.2 ML
BH CV ECHO MEAS - SV(LVOT): 56.6 ML
BH CV ECHO MEAS - SV(MOD-SP4): 25.2 ML
BH CV ECHO MEAS - SV(TEICH): 49.1 ML
BH CV ECHO MEAS - TR MAX VEL: 251.4 CM/SEC
BH CV REST NUCLEAR ISOTOPE DOSE: 7.9 MCI
BH CV STRESS BP STAGE 1: NORMAL
BH CV STRESS BP STAGE 2: NORMAL
BH CV STRESS COMMENTS STAGE 1: NORMAL
BH CV STRESS COMMENTS STAGE 2: NORMAL
BH CV STRESS DOSE REGADENOSON STAGE 1: 0.4
BH CV STRESS DURATION MIN STAGE 1: 0
BH CV STRESS DURATION MIN STAGE 2: 4
BH CV STRESS DURATION SEC STAGE 1: 10
BH CV STRESS DURATION SEC STAGE 2: 0
BH CV STRESS HR STAGE 1: 74
BH CV STRESS HR STAGE 2: 98
BH CV STRESS NUCLEAR ISOTOPE DOSE: 21.9 MCI
BH CV STRESS PROTOCOL 1: NORMAL
BH CV STRESS RECOVERY BP: NORMAL MMHG
BH CV STRESS RECOVERY HR: 90 BPM
BH CV STRESS STAGE 1: 1
BH CV STRESS STAGE 2: 2
LV EF NUC BP: 70 %
MAXIMAL PREDICTED HEART RATE: 147 BPM
PERCENT MAX PREDICTED HR: 69.39 %
STRESS BASELINE BP: NORMAL MMHG
STRESS BASELINE HR: 74 BPM
STRESS PERCENT HR: 82 %
STRESS POST PEAK BP: NORMAL MMHG
STRESS POST PEAK HR: 102 BPM
STRESS TARGET HR: 125 BPM

## 2022-02-22 PROCEDURE — 99214 OFFICE O/P EST MOD 30 MIN: CPT | Performed by: INTERNAL MEDICINE

## 2022-02-22 RX ORDER — AMLODIPINE BESYLATE 2.5 MG/1
2.5 TABLET ORAL DAILY
Qty: 90 TABLET | Refills: 3 | Status: SHIPPED | OUTPATIENT
Start: 2022-02-22 | End: 2023-03-07

## 2022-02-22 NOTE — PROGRESS NOTES
Cardiology Clinic Note  Neri Ugarte MD, PhD    Subjective:     Encounter Date:02/22/2022      Patient ID: Lucrecia Ramesh is a 71 y.o. female.    Chief Complaint:  Chief Complaint   Patient presents with   • Follow-up       HPI:         I the pleasure to see this 71-year-old female today, history of nonobstructive CAD 50% of the mid LAD reportedly from remote heart cath.  She does display some substernal chest discomfort episodically which is different from her heartburn.  She describes it as a pressure squeezing sensation lasting 20 to 30 minutes at a time.  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  Her medicines consist of aspirin, long-acting nitrate calcium channel blocker lisinopril HCTZ and simvastatin.  She not had labs in some time in our system goes back to May of last year and fasting lipid studies was not done since last January which demonstrated LDL of 70 HDL of 59 total cholesterol 139 at target on present medical therapy.  These needs to be repeated and she is amenable for labs.  She had a stress test about 2 years ago which demonstrated normal perfusion and EF 70%.  Echo similarly with normal LVEF 60 to 65% no significant valvular abnormality.  She continues to smoke heavily 2 packs/day was counseled to quit today but no quit date established.  No explain syncope palpitations.  She does complain of calf cramping on both sides when she is walking describing it was charley horses but could represent PVD with her heavy smoking history and other risk factors.  She is amenable for ABIs for evaluation    She presents back to clinic today, continues to smoke despite significant counseling.  1-1/2 to 2 packs/day, she gets activity with work regularly, she denies any new onset chest pain but she still has atypical symptoms nonradiating not associate with any diaphoresis unexplained syncope or palpitations.  Her essential hypertension well controlled in  "fact quite low at 100 systolic which was present previously.  She is on amlodipine lisinopril HCTZ and a beta-blocker with heart rates in the upper 90s.  Is likely too low and we will decrease her amlodipine as she has been hospitalized for hypotension in the past she says.  Stress test reviewed and interpreted by me demonstrates no reversible ischemia with fixed inferoseptal defect likely GI versus diaphragmatic attenuation artifact looking at the images, EF hyperdynamic 70%.  2D echo EF 65%, grade 1 diastolic dysfunction and mild concentric LVH otherwise no significant valvular abnormalities with trace to mild regurgitation, ABIs were also normal with no evidence of obstructive PVD     Historical data copied forward from previous encounters in EMR including the history, exam, and assessment/plan has been reviewed and is unchanged unless noted otherwise.     Cardiac medicines reviewed with risk, benefits, and necessity of each discussed.     Risk and benefit of cardiac testing reviewed including death heart attack stroke pain bleeding infection need for vascular /cardiovascular surgery were discussed and the patient      Objective:         Objective          /62 (BP Location: Left arm, Patient Position: Sitting)   Pulse 90s  Resp 18   Ht 152.4 cm (60\")   Wt 48.9 kg (107 lb 12.8 oz)   BMI 21.05 kg/m²      Physical Exam  Regular rate and rhythm no rubs murmurs gallops  Clear to auscultation  Soft nontender nondistended  Radial pulses 1+ equal bilaterally  Normal cap refill  No peripheral wounds  Neurologic grossly intact  Vitals reviewed  Unchanged from prior  Assessment:         Assessment          Chest pain with prior known CAD in LAD distribution  Unremarkable stress test with no anterior reversibility, hyperdynamic EF 70%  No reversibility seen in any other distribution with fixed inferoseptal defect likely GI attenuation    2D echo for structural functional evaluation EF 65%, no significant valvular " "abnormality, grade 1 diastolic dysfunction and mild concentric LVH    Essential hypertension, blood pressure is quite low, decrease amlodipine to 2.5 daily, metoprolol XL 25 daily just for heart rates less than 80 given LVH  Goal blood pressure simply less than 135 systolic    Hyperlipidemia    Tobacco abuse, cessation Counseling again today was performed but no quit date established    Did not get ordered labs, fasting lipid panel, CMP CBC and TSH as well can follow-up with primary care for these     Return to clinic 6 months to 1 year     Further recommendation follow findings           The pleasure to be involved in this patient's cardiovascular care.  Please call with any questions or concerns  Neri Ugarte MD, PhD         Historical data copied forward from previous encounters in EMR including the history, exam, and assessment/plan has been reviewed and is unchanged unless noted otherwise.    Cardiac medicines reviewed with risk, benefits, and necessity of each discussed.    Risk and benefit of cardiac testing reviewed including death heart attack stroke pain bleeding infection need for vascular /cardiovascular surgery were discussed and the patient     Objective:         /58 (BP Location: Left arm, Patient Position: Sitting)   Pulse 90   Resp 18   Ht 152.4 cm (60\")   Wt 47 kg (103 lb 9.6 oz)   BMI 20.23 kg/m²     Physical Exam    Assessment:         There are no diagnoses linked to this encounter.       Plan:              The pleasure to be involved in this patient's cardiovascular care.  Please call with any questions or concerns  Neri Ugarte MD, PhD    Most recent EKG as reviewed and interpreted by me:  Procedures     Most recent echo as reviewed and interpreted by me:  Results for orders placed during the hospital encounter of 02/15/22    Adult Transthoracic Echo Complete W/ Cont if Necessary Per Protocol    Interpretation Summary  · Left ventricular systolic function is normal.  · Left " ventricular ejection fraction is 60 to 65%  · Left ventricular wall thickness is consistent with mild concentric hypertrophy.  · Left ventricular diastolic function is consistent with (grade I) impaired relaxation.      Most recent stress test as reviewed and interpreted by me:  Results for orders placed during the hospital encounter of 02/15/22    Stress Test With Myocardial Perfusion One Day    Interpretation Summary  · Diaphragmatic attenuation and GI artifacts are present.  · Left ventricular ejection fraction is hyperdynamic (Calculated EF > 70%). (Calculated EF = 70%).  · Myocardial perfusion imaging indicates a normal myocardial perfusion study with no evidence of ischemia.  · Impressions are consistent with a low risk study.  · There is no prior study available for comparison. Fixed inferoseptal defect c/w more GI/diaphragmatic attenuation, better with stress. EF 70%.  · Findings consistent with an equivocal ECG stress test.    Low risk stress  Fixed inferoseptal defect c/w more GI/diaphragmatic attenuation, better with stress. EF 70%  No reversibility  No ECG changes c/w ischemia      Most recent cardiac catheterization as reviewed interpreted by me:  No results found for this or any previous visit.    The following portions of the patient's history were reviewed and updated as appropriate: allergies, current medications, past family history, past medical history, past social history, past surgical history and problem list.      ROS:  14 point review of systems negative except as mentioned above    Current Outpatient Medications:   •  amLODIPine (NORVASC) 10 MG tablet, Take 1 tablet by mouth Daily., Disp: 90 tablet, Rfl: 3  •  aspirin 81 MG chewable tablet, Chew 81 mg Daily., Disp: , Rfl:   •  Dexilant 60 MG capsule, TAKE 1 CAPSULE BY MOUTH EVERY DAY AS DIRECTED, Disp: , Rfl:   •  docusate sodium (COLACE) 100 MG capsule, Take 200 mg by mouth 2 (Two) Times a Day., Disp: , Rfl:   •   Fluticasone-Umeclidin-Vilant (TRELEGY ELLIPTA) 100-62.5-25 MCG/INH aerosol powder , Inhale 1 puff Daily., Disp: , Rfl:   •  gabapentin (NEURONTIN) 300 MG capsule, TAKE 1 CAPSULE BY MOUTH EVERY DAY AT BEDTIME, Disp: 90 capsule, Rfl: 2  •  isosorbide mononitrate (IMDUR) 30 MG 24 hr tablet, Take 1 tablet by mouth Daily., Disp: 90 tablet, Rfl: 1  •  lisinopril-hydrochlorothiazide (PRINZIDE,ZESTORETIC) 20-12.5 MG per tablet, Take 1 tablet by mouth Daily., Disp: 90 tablet, Rfl: 3  •  meloxicam (MOBIC) 15 MG tablet, Take 15 mg by mouth Daily., Disp: , Rfl:   •  omeprazole (priLOSEC) 40 MG capsule, Take 40 mg by mouth Daily., Disp: , Rfl:   •  ondansetron (ZOFRAN) 4 MG tablet, Take 4 mg by mouth Every 8 (Eight) Hours As Needed for Nausea or Vomiting., Disp: , Rfl:   •  simvastatin (ZOCOR) 10 MG tablet, Take 10 mg by mouth Every Night., Disp: , Rfl:     Problem List:  Patient Active Problem List   Diagnosis   • Chest pain   • Essential hypertension   • Coronary artery disease of native artery of native heart with stable angina pectoris (HCC)   • Mixed hyperlipidemia   • Current smoker   • Anxiety disorder   • Cerebrovascular accident (CVA) (HCC)   • Chronic obstructive pulmonary disease (HCC)   • Gastroesophageal reflux disease   • Tobacco dependence syndrome     Past Medical History:  Past Medical History:   Diagnosis Date   • Allergic    • Angina pectoris (HCC)    • Arthritis    • Asthma    • Breast cyst    • COPD (chronic obstructive pulmonary disease) (HCC)    • Coronary artery disease    • Fibrocystic breast    • GERD (gastroesophageal reflux disease)    • Hyperlipidemia    • Hypertension    • Injury of back    • Stroke (HCC)     ROM slightly limited-nerve damage     Past Surgical History:  Past Surgical History:   Procedure Laterality Date   • BREAST CYST EXCISION     • CARDIAC CATHETERIZATION      has had 2- sees Dr. Cadet   • HYSTERECTOMY       Social History:  Social History     Socioeconomic History   • Marital  status:    Tobacco Use   • Smoking status: Current Every Day Smoker     Packs/day: 1.50   • Smokeless tobacco: Never Used   Vaping Use   • Vaping Use: Never used   Substance and Sexual Activity   • Alcohol use: No   • Drug use: Never   • Sexual activity: Defer     Allergies:  Allergies   Allergen Reactions   • Neomycin-Bacitracin Zn-Polymyx Rash   • Codeine GI Intolerance     Immunizations:  Immunization History   Administered Date(s) Administered   • FluLaval/Fluarix/Fluzone >6 10/17/2019   • Fluzone High Dose =>65 Years ([facility] ONLY) 09/30/2015, 08/12/2016, 01/06/2018, 10/25/2018   • Influenza Quad Vaccine (Inpatient) 10/12/2014   • Influenza, Unspecified 09/09/2017   • Pneumococcal Conjugate 13-Valent (PCV13) 06/12/2015   • Pneumococcal Polysaccharide (PPSV23) 06/16/2016, 07/15/2016   • Zostavax 06/12/2015, 09/30/2015            In-Office Procedure(s):  No orders to display        ASCVD RIsk Score::  The ASCVD Risk score (Wang ROJAS Jr., et al., 2013) failed to calculate for the following reasons:    The patient has a prior MI or stroke diagnosis    Imaging:    Results for orders placed during the hospital encounter of 05/17/20    XR Chest 1 View    Narrative  XR CHEST 1 VW-    Date of Exam: 5/17/2020 7:45 AM    Indication: Chest pain protocol.    Comparison Exams: 01/07/2020    Technique: Single AP chest radiograph    FINDINGS:  The lungs are clear, with note of a few calcified granulomas. The heart  and mediastinal contours appear normal. The pulmonary vasculature  appears normal. The osseous structures appear intact.    Impression  No acute cardiopulmonary process identified.    Electronically Signed By-DR. Long Mazariegos MD On:5/17/2020 8:12 AM  This report was finalized on [id number] by DR. Long Mazariegos MD.       Results for orders placed during the hospital encounter of 09/20/21    CT Head Without Contrast    Narrative  EXAMINATION: CT HEAD WO CONTRAST    DATE: 9/21/2021 12:58  AM    INDICATION: Fall today with dizziness. History of prior right-sided stroke.    COMPARISON: CT brain, 5/23/2015    TECHNIQUE: Thin section noncontrast axial images were obtained through the head. Coronal reformatted images were created.  CT dose lowering techniques were used, to include: automated exposure control, adjustment for patient size, and or use of iterative  reconstruction    FINDINGS:  Intracranial contents:    Parenchymal volume is normal. No hydrocephalus.   A few foci of hypoattenuation within periventricular white matter most commonly represent chronic microangiopathy. Chronic lacunar infarct in the left thalamus. There is no midline shift or mass effect.  No hemorrhage, mass lesion, or evidence of evolving large territorial infarct.    Bones and extracranial soft tissues:    Normal calvarium.  Orbits unremarkable. The visualized paranasal sinuses are clear. No mastoid or middle ear effusions.    Impression  1.  No hemorrhage or acute intracranial abnormality.    2.  Chronic findings as above.    Electronically signed by:  Kristian Ernst  9/20/2021 11:43 PM      Results for orders placed during the hospital encounter of 09/20/21    CT Head Without Contrast    Narrative  EXAMINATION: CT HEAD WO CONTRAST    DATE: 9/21/2021 12:58 AM    INDICATION: Fall today with dizziness. History of prior right-sided stroke.    COMPARISON: CT brain, 5/23/2015    TECHNIQUE: Thin section noncontrast axial images were obtained through the head. Coronal reformatted images were created.  CT dose lowering techniques were used, to include: automated exposure control, adjustment for patient size, and or use of iterative  reconstruction    FINDINGS:  Intracranial contents:    Parenchymal volume is normal. No hydrocephalus.   A few foci of hypoattenuation within periventricular white matter most commonly represent chronic microangiopathy. Chronic lacunar infarct in the left thalamus. There is no midline shift or mass  effect.  No hemorrhage, mass lesion, or evidence of evolving large territorial infarct.    Bones and extracranial soft tissues:    Normal calvarium.  Orbits unremarkable. The visualized paranasal sinuses are clear. No mastoid or middle ear effusions.    Impression  1.  No hemorrhage or acute intracranial abnormality.    2.  Chronic findings as above.    Electronically signed by:  Kristian Ernst  9/20/2021 11:43 PM      Lab Review:   Hospital Outpatient Visit on 02/15/2022   Component Date Value   • Target HR (85%) 02/15/2022 125    • Max. Pred. HR (100%) 02/15/2022 147    • BH CV STRESS PROTOCOL 1 02/15/2022 Pharmacologic    • Stage 1 02/15/2022 1    • HR Stage 1 02/15/2022 74    • BP Stage 1 02/15/2022 133/80    • Duration Min Stage 1 02/15/2022 0    • Duration Sec Stage 1 02/15/2022 10    • Stress Dose Regadenoson * 02/15/2022 0.4    • Stress Comments Stage 1 02/15/2022 10 sec bolus injection    • Stage 2 02/15/2022 2    • HR Stage 2 02/15/2022 98    • BP Stage 2 02/15/2022 131/82    • Duration Min Stage 2 02/15/2022 4    • Duration Sec Stage 2 02/15/2022 0    • Stress Comments Stage 2 02/15/2022 recovery    • Baseline HR 02/15/2022 74    • Baseline BP 02/15/2022 133/80    • Peak HR 02/15/2022 102    • Percent Max Pred HR 02/15/2022 69.39    • Percent Target HR 02/15/2022 82    • Peak BP 02/15/2022 153/83    • Recovery HR 02/15/2022 90    • Recovery BP 02/15/2022 141/84    • BH CV REST NUCLEAR ISOTO* 02/15/2022 7.9    • BH CV STRESS NUCLEAR ISO* 02/15/2022 21.9    • Nuc Stress EF 02/15/2022 70    Hospital Outpatient Visit on 02/15/2022   Component Date Value   • BSA 02/15/2022 1.4    • RVIDd 02/15/2022 2.2    • IVSd 02/15/2022 0.81    • LVIDd 02/15/2022 4.1    • LVIDs 02/15/2022 2.7    • LVPWd 02/15/2022 0.88    • IVS/LVPW 02/15/2022 0.92    • FS 02/15/2022 35.5    • EDV(Teich) 02/15/2022 75.1    • ESV(Teich) 02/15/2022 26.0    • EF(Teich) 02/15/2022 65.4    • EDV(cubed) 02/15/2022 70.0    • ESV(cubed) 02/15/2022  18.8    • EF(cubed) 02/15/2022 73.2    • LV mass(C)d 02/15/2022 105.1    • LV mass(C)dI 02/15/2022 74.0    • SV(Teich) 02/15/2022 49.1    • SI(Teich) 02/15/2022 34.6    • SV(cubed) 02/15/2022 51.2    • SI(cubed) 02/15/2022 36.1    • Ao root diam 02/15/2022 2.5    • Ao root area 02/15/2022 4.9    • ACS 02/15/2022 1.5    • LVOT diam 02/15/2022 1.8    • LVOT area 02/15/2022 2.6    • EDV(MOD-sp4) 02/15/2022 44.8    • ESV(MOD-sp4) 02/15/2022 19.6    • EF(MOD-sp4) 02/15/2022 56.3    • SV(MOD-sp4) 02/15/2022 25.2    • SI(MOD-sp4) 02/15/2022 17.8    • Ao root area (BSA correc* 02/15/2022 1.8    • LV Torres Vol (BSA correct* 02/15/2022 31.6    • LV Sys Vol (BSA correcte* 02/15/2022 13.8    • Aortic R-R 02/15/2022 0.78    • Aortic HR 02/15/2022 76.5    • MV E max chari 02/15/2022 56.2    • MV A max chari 02/15/2022 56.2    • MV E/A 02/15/2022 1.0    • MV V2 max 02/15/2022 77.2    • MV max PG 02/15/2022 2.4    • MV V2 mean 02/15/2022 49.2    • MV mean PG 02/15/2022 1.1    • MV V2 VTI 02/15/2022 19.6    • MVA(VTI) 02/15/2022 2.9    • MV dec slope 02/15/2022 245.3    • MV dec time 02/15/2022 0.23    • Ao pk chari 02/15/2022 104.8    • Ao max PG 02/15/2022 4.4    • Ao max PG (full) 02/15/2022 0.36    • Ao V2 mean 02/15/2022 76.8    • Ao mean PG 02/15/2022 2.5    • Ao mean PG (full) 02/15/2022 0.35    • Ao V2 VTI 02/15/2022 22.8    • ZEHRA(I,A) 02/15/2022 2.5    • ZEHRA(I,D) 02/15/2022 2.5    • ZEHRA(V,A) 02/15/2022 2.5    • ZEHRA(V,D) 02/15/2022 2.5    • LV V1 max PG 02/15/2022 4.0    • LV V1 mean PG 02/15/2022 2.2    • LV V1 max 02/15/2022 100.5    • LV V1 mean 02/15/2022 70.7    • LV V1 VTI 02/15/2022 22.1    • MR max chari 02/15/2022 467.2    • MR max PG 02/15/2022 87.3    • CO(Ao) 02/15/2022 8.6    • CI(Ao) 02/15/2022 6.1    • SV(Ao) 02/15/2022 112.5    • SI(Ao) 02/15/2022 79.3    • CO(LVOT) 02/15/2022 4.3    • CI(LVOT) 02/15/2022 3.1    • SV(LVOT) 02/15/2022 56.6    • SI(LVOT) 02/15/2022 39.9    • PA V2 max 02/15/2022 90.4    • PA max PG  02/15/2022 3.3    • PA max PG (full) 02/15/2022 1.9    • PA V2 mean 02/15/2022 66.9    • PA mean PG 02/15/2022 2.0    • PA mean PG (full) 02/15/2022 1.2    • PA V2 VTI 02/15/2022 19.6    • PI end-d chari 02/15/2022 112.7    • PI max chari 02/15/2022 153.0    • PI max PG 02/15/2022 9.4    • RV V1 max PG 02/15/2022 1.4    • RV V1 mean PG 02/15/2022 0.81    • RV V1 max 02/15/2022 58.2    • RV V1 mean 02/15/2022 43.4    • RV V1 VTI 02/15/2022 13.3    • TR max chari 02/15/2022 251.4    • RVSP(TR) 02/15/2022 28.3    • RAP systole 02/15/2022 3.0    • BH CV ECHO GENIE - BZI_BMI 02/15/2022 20.5    •  CV ECHO GENIE - BSA(HA* 02/15/2022 1.4    •  CV ECHO GENIE - BZI_ME* 02/15/2022 47.6    •  CV ECHO GENIE - BZI_ME* 02/15/2022 152.4    • EF(MOD-bp) 02/15/2022 56.0    • LA dimension(2D) 02/15/2022 2.6    Hospital Outpatient Visit on 02/15/2022   Component Date Value   • Target HR (85%) 02/15/2022 125    • Max. Pred. HR (100%) 02/15/2022 147    • RIGHT DORSALIS PEDIS SYS* 02/15/2022 138    • RIGHT POST TIBIAL SYS MAX 02/15/2022 137    • RIGHT GREAT TOE SYS MAX 02/15/2022 183    • RIGHT ADRIENNE RATIO 02/15/2022 1.15    • RIGHT TBI RATIO 02/15/2022 1.53    • LEFT DORSALIS PEDIS SYS * 02/15/2022 122    • LEFT POST TIBIAL SYS MAX 02/15/2022 127    • LEFT GREAT TOE SYS MAX 02/15/2022 95    • LEFT ADRIENNE RATIO 02/15/2022 1.06    • LEFT TBI RATIO 02/15/2022 0.79    • Upper arterial right arm* 02/15/2022 120    • Upper arterial left arm * 02/15/2022 117      Recent labs reviewed and interpreted for clinical significance and application            Level of Care:           Neri Ugarte MD  02/22/22  .

## 2022-03-02 ENCOUNTER — TRANSCRIBE ORDERS (OUTPATIENT)
Dept: ADMINISTRATIVE | Facility: HOSPITAL | Age: 72
End: 2022-03-02

## 2022-03-02 ENCOUNTER — OFFICE (AMBULATORY)
Dept: URBAN - METROPOLITAN AREA CLINIC 64 | Facility: CLINIC | Age: 72
End: 2022-03-02

## 2022-03-02 VITALS
WEIGHT: 104 LBS | HEIGHT: 60 IN | DIASTOLIC BLOOD PRESSURE: 72 MMHG | HEART RATE: 78 BPM | SYSTOLIC BLOOD PRESSURE: 95 MMHG

## 2022-03-02 DIAGNOSIS — R14.0 ABDOMINAL DISTENSION (GASEOUS): ICD-10-CM

## 2022-03-02 DIAGNOSIS — R10.11 RIGHT UPPER QUADRANT PAIN: ICD-10-CM

## 2022-03-02 DIAGNOSIS — K59.09 CHRONIC CONSTIPATION: Primary | ICD-10-CM

## 2022-03-02 PROCEDURE — 99213 OFFICE O/P EST LOW 20 MIN: CPT | Performed by: INTERNAL MEDICINE

## 2022-03-14 ENCOUNTER — HOSPITAL ENCOUNTER (OUTPATIENT)
Dept: ULTRASOUND IMAGING | Facility: HOSPITAL | Age: 72
Discharge: HOME | End: 2022-03-14
Admitting: INTERNAL MEDICINE

## 2022-03-14 ENCOUNTER — HOSPITAL ENCOUNTER (OUTPATIENT)
Dept: NUCLEAR MEDICINE | Facility: HOSPITAL | Age: 72
Discharge: HOME | End: 2022-03-14

## 2022-03-14 DIAGNOSIS — K59.09 CHRONIC CONSTIPATION: ICD-10-CM

## 2022-03-14 PROCEDURE — 76705 ECHO EXAM OF ABDOMEN: CPT

## 2022-03-14 PROCEDURE — A9537 TC99M MEBROFENIN: HCPCS | Performed by: INTERNAL MEDICINE

## 2022-03-14 PROCEDURE — 0 TECHNETIUM TC 99M MEBROFENIN KIT: Performed by: INTERNAL MEDICINE

## 2022-03-14 PROCEDURE — 78227 HEPATOBIL SYST IMAGE W/DRUG: CPT

## 2022-03-14 RX ORDER — KIT FOR THE PREPARATION OF TECHNETIUM TC 99M MEBROFENIN 45 MG/10ML
1 INJECTION, POWDER, LYOPHILIZED, FOR SOLUTION INTRAVENOUS
Status: COMPLETED | OUTPATIENT
Start: 2022-03-14 | End: 2022-03-14

## 2022-03-14 RX ADMIN — MEBROFENIN 1 DOSE: 45 INJECTION, POWDER, LYOPHILIZED, FOR SOLUTION INTRAVENOUS at 07:50

## 2022-04-26 PROBLEM — Z86.010 PERSONAL HISTORY OF COLONIC POLYPS: Status: ACTIVE | Noted: 2019-08-21

## 2022-05-13 RX ORDER — LISINOPRIL AND HYDROCHLOROTHIAZIDE 20; 12.5 MG/1; MG/1
1 TABLET ORAL DAILY
Qty: 90 TABLET | Refills: 1 | Status: SHIPPED | OUTPATIENT
Start: 2022-05-13 | End: 2022-11-07

## 2022-06-01 ENCOUNTER — OFFICE (AMBULATORY)
Dept: URBAN - METROPOLITAN AREA PATHOLOGY 4 | Facility: PATHOLOGY | Age: 72
End: 2022-06-01
Payer: COMMERCIAL

## 2022-06-01 ENCOUNTER — ON CAMPUS - OUTPATIENT (AMBULATORY)
Dept: URBAN - METROPOLITAN AREA HOSPITAL 2 | Facility: HOSPITAL | Age: 72
End: 2022-06-01
Payer: COMMERCIAL

## 2022-06-01 VITALS
SYSTOLIC BLOOD PRESSURE: 95 MMHG | SYSTOLIC BLOOD PRESSURE: 111 MMHG | HEART RATE: 86 BPM | SYSTOLIC BLOOD PRESSURE: 145 MMHG | HEART RATE: 76 BPM | HEART RATE: 70 BPM | WEIGHT: 107 LBS | RESPIRATION RATE: 16 BRPM | DIASTOLIC BLOOD PRESSURE: 54 MMHG | TEMPERATURE: 97.5 F | SYSTOLIC BLOOD PRESSURE: 115 MMHG | SYSTOLIC BLOOD PRESSURE: 144 MMHG | OXYGEN SATURATION: 99 % | OXYGEN SATURATION: 100 % | HEART RATE: 81 BPM | OXYGEN SATURATION: 98 % | SYSTOLIC BLOOD PRESSURE: 102 MMHG | DIASTOLIC BLOOD PRESSURE: 72 MMHG | SYSTOLIC BLOOD PRESSURE: 107 MMHG | DIASTOLIC BLOOD PRESSURE: 55 MMHG | HEIGHT: 60 IN | HEART RATE: 77 BPM | RESPIRATION RATE: 14 BRPM | DIASTOLIC BLOOD PRESSURE: 37 MMHG | RESPIRATION RATE: 18 BRPM | HEART RATE: 78 BPM | DIASTOLIC BLOOD PRESSURE: 51 MMHG | DIASTOLIC BLOOD PRESSURE: 66 MMHG | DIASTOLIC BLOOD PRESSURE: 70 MMHG | SYSTOLIC BLOOD PRESSURE: 140 MMHG | SYSTOLIC BLOOD PRESSURE: 100 MMHG | DIASTOLIC BLOOD PRESSURE: 50 MMHG | HEART RATE: 74 BPM | DIASTOLIC BLOOD PRESSURE: 52 MMHG | SYSTOLIC BLOOD PRESSURE: 127 MMHG | HEART RATE: 73 BPM

## 2022-06-01 DIAGNOSIS — D12.5 BENIGN NEOPLASM OF SIGMOID COLON: ICD-10-CM

## 2022-06-01 DIAGNOSIS — K63.5 POLYP OF COLON: ICD-10-CM

## 2022-06-01 DIAGNOSIS — R13.10 DYSPHAGIA, UNSPECIFIED: ICD-10-CM

## 2022-06-01 DIAGNOSIS — Z86.010 PERSONAL HISTORY OF COLONIC POLYPS: ICD-10-CM

## 2022-06-01 DIAGNOSIS — K64.1 SECOND DEGREE HEMORRHOIDS: ICD-10-CM

## 2022-06-01 DIAGNOSIS — K57.30 DIVERTICULOSIS OF LARGE INTESTINE WITHOUT PERFORATION OR ABS: ICD-10-CM

## 2022-06-01 LAB
GI HISTOLOGY: A. UNSPECIFIED: (no result)
GI HISTOLOGY: B. UNSPECIFIED: (no result)
GI HISTOLOGY: PDF REPORT: (no result)

## 2022-06-01 PROCEDURE — 88305 TISSUE EXAM BY PATHOLOGIST: CPT | Mod: 26 | Performed by: INTERNAL MEDICINE

## 2022-06-01 PROCEDURE — 43450 DILATE ESOPHAGUS 1/MULT PASS: CPT | Performed by: INTERNAL MEDICINE

## 2022-06-01 PROCEDURE — 43235 EGD DIAGNOSTIC BRUSH WASH: CPT | Performed by: INTERNAL MEDICINE

## 2022-06-01 PROCEDURE — 45380 COLONOSCOPY AND BIOPSY: CPT | Mod: PT | Performed by: INTERNAL MEDICINE

## 2022-06-08 ENCOUNTER — OFFICE (AMBULATORY)
Dept: URBAN - METROPOLITAN AREA CLINIC 64 | Facility: CLINIC | Age: 72
End: 2022-06-08
Payer: COMMERCIAL

## 2022-06-08 VITALS
HEIGHT: 60 IN | WEIGHT: 108 LBS | HEART RATE: 71 BPM | SYSTOLIC BLOOD PRESSURE: 145 MMHG | DIASTOLIC BLOOD PRESSURE: 84 MMHG

## 2022-06-08 DIAGNOSIS — R14.0 ABDOMINAL DISTENSION (GASEOUS): ICD-10-CM

## 2022-06-08 DIAGNOSIS — R13.10 DYSPHAGIA, UNSPECIFIED: ICD-10-CM

## 2022-06-08 PROCEDURE — 99214 OFFICE O/P EST MOD 30 MIN: CPT | Performed by: INTERNAL MEDICINE

## 2022-08-22 RX ORDER — ISOSORBIDE MONONITRATE 30 MG/1
30 TABLET, EXTENDED RELEASE ORAL DAILY
Qty: 90 TABLET | Refills: 1 | Status: SHIPPED | OUTPATIENT
Start: 2022-08-22 | End: 2023-03-07

## 2022-11-07 RX ORDER — LISINOPRIL AND HYDROCHLOROTHIAZIDE 20; 12.5 MG/1; MG/1
1 TABLET ORAL DAILY
Qty: 90 TABLET | Refills: 1 | Status: SHIPPED | OUTPATIENT
Start: 2022-11-07

## 2023-02-07 ENCOUNTER — TRANSCRIBE ORDERS (OUTPATIENT)
Dept: ADMINISTRATIVE | Facility: HOSPITAL | Age: 73
End: 2023-02-07
Payer: MEDICARE

## 2023-02-07 DIAGNOSIS — Z12.31 SCREENING MAMMOGRAM, ENCOUNTER FOR: Primary | ICD-10-CM

## 2023-02-15 ENCOUNTER — HOSPITAL ENCOUNTER (OUTPATIENT)
Dept: MAMMOGRAPHY | Facility: HOSPITAL | Age: 73
Discharge: HOME | End: 2023-02-15
Admitting: FAMILY MEDICINE
Payer: MEDICARE

## 2023-02-15 DIAGNOSIS — Z12.31 SCREENING MAMMOGRAM, ENCOUNTER FOR: ICD-10-CM

## 2023-02-15 PROCEDURE — 77067 SCR MAMMO BI INCL CAD: CPT

## 2023-02-15 PROCEDURE — 77063 BREAST TOMOSYNTHESIS BI: CPT

## 2023-02-22 ENCOUNTER — OFFICE VISIT (OUTPATIENT)
Dept: CARDIOLOGY | Facility: CLINIC | Age: 73
End: 2023-02-22
Payer: MEDICARE

## 2023-02-22 VITALS
WEIGHT: 108 LBS | RESPIRATION RATE: 18 BRPM | BODY MASS INDEX: 21.2 KG/M2 | SYSTOLIC BLOOD PRESSURE: 154 MMHG | DIASTOLIC BLOOD PRESSURE: 93 MMHG | HEART RATE: 79 BPM | HEIGHT: 60 IN

## 2023-02-22 DIAGNOSIS — E78.2 MIXED HYPERLIPIDEMIA: ICD-10-CM

## 2023-02-22 DIAGNOSIS — I20.8 STABLE ANGINA PECTORIS: ICD-10-CM

## 2023-02-22 DIAGNOSIS — I63.10 CEREBROVASCULAR ACCIDENT (CVA) DUE TO EMBOLISM OF PRECEREBRAL ARTERY: ICD-10-CM

## 2023-02-22 DIAGNOSIS — I25.118 CORONARY ARTERY DISEASE OF NATIVE ARTERY OF NATIVE HEART WITH STABLE ANGINA PECTORIS: Primary | ICD-10-CM

## 2023-02-22 DIAGNOSIS — F17.200 TOBACCO DEPENDENCE SYNDROME: ICD-10-CM

## 2023-02-22 DIAGNOSIS — I10 ESSENTIAL HYPERTENSION: ICD-10-CM

## 2023-02-22 PROCEDURE — 99214 OFFICE O/P EST MOD 30 MIN: CPT | Performed by: INTERNAL MEDICINE

## 2023-02-22 RX ORDER — CLOPIDOGREL BISULFATE 75 MG/1
TABLET ORAL DAILY
COMMUNITY
Start: 2023-01-30

## 2023-02-22 NOTE — PROGRESS NOTES
Cardiology Clinic Note  Neri Ugarte MD, PhD    Subjective:     Encounter Date:02/22/2023      Patient ID: Lucrecia Ramesh is a 72 y.o. female.    Chief Complaint:  Chief Complaint   Patient presents with   • Follow-up       HPI:       I the pleasure to see this 71-year-old female today, history of nonobstructive CAD 50% of the mid LAD reportedly from remote heart cath.  She does display some substernal chest discomfort episodically which is different from her heartburn.  She describes it as a pressure squeezing sensation lasting 20 to 30 minutes at a time.  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  Her medicines consist of aspirin, long-acting nitrate calcium channel blocker lisinopril HCTZ and simvastatin.  She not had labs in some time in our system goes back to May of last year and fasting lipid studies was not done since last January which demonstrated LDL of 70 HDL of 59 total cholesterol 139 at target on present medical therapy.  These needs to be repeated and she is amenable for labs.  She had a stress test about 2 years ago which demonstrated normal perfusion and EF 70%.  Echo similarly with normal LVEF 60 to 65% no significant valvular abnormality.  She continues to smoke heavily 2 packs/day was counseled to quit today but no quit date established.  No explain syncope palpitations.  She does complain of calf cramping on both sides when she is walking describing it was charley horses but could represent PVD with her heavy smoking history and other risk factors.  She is amenable for ABIs for evaluation     She presents back to clinic today, continues to smoke despite significant counseling.  1-1/2 to 2 packs/day, she gets activity with work regularly, she denies any new onset chest pain but she still has atypical symptoms nonradiating not associate with any diaphoresis unexplained syncope or palpitations.  February 2022 Stress test reviewed and  interpreted by me demonstrates no reversible ischemia with fixed inferoseptal defect likely GI versus diaphragmatic attenuation artifact looking at the images, EF hyperdynamic 70%.  2D echo EF 65%, grade 1 diastolic dysfunction and mild concentric LVH otherwise no significant valvular abnormalities with trace to mild regurgitation, ABIs were also normal with no evidence of obstructive PVD.    She denies any new symptoms today on repeat encounter, continues to smoke despite counseling, no heart failure signs or symptoms, blood pressures well controlled, stable medicines on board without any recurrent anginal chest pain.     Historical data copied forward from previous encounters in EMR including the history, exam, and assessment/plan has been reviewed and is unchanged unless noted otherwise.     Cardiac medicines reviewed with risk, benefits, and necessity of each discussed.     Risk and benefit of cardiac testing reviewed including death heart attack stroke pain bleeding infection need for vascular /cardiovascular surgery were discussed and the patient      Objective:         Objective          Vitals reviewed below  Home logs reviewed 1 20-1 30 systolic     Physical Exam  Regular rate and rhythm no rubs murmurs gallops  Clear to auscultation  Soft nontender nondistended  Radial pulses 1+ equal bilaterally  Normal cap refill  No peripheral wounds  Neurologic grossly intact  Vitals reviewed  Unchanged from prior  Assessment:         Assessment          CAD in LAD distribution, angina free  Unremarkable stress test February 2022 with no anterior reversibility, hyperdynamic EF 70%  No reversibility seen in any other distribution with fixed inferoseptal defect likely GI attenuation     2D echo February 2022 for structural functional evaluation EF 65%, no significant valvular abnormality, grade 1 diastolic dysfunction and mild concentric LVH     Essential hypertension, blood pressure adequate today 130 systolic, continue  "amlodipine to 2.5 daily, metoprolol XL 25 daily just for heart rates less than 80 given LVH  Goal blood pressure simply less than 135 systolic, at goal at home, previously had hypotension on higher doses of amlodipine     Hyperlipidemia     Tobacco abuse, cessation Counseling again today was performed but no quit date established, continues to smoke despite counseling     Follow-up primary care for screening labs 6 months to a year     Return to clinic 6 months to 1 year           The pleasure to be involved in this patient's cardiovascular care.  Please call with any questions or concerns  Neri Ugarte MD, PhD        Historical data copied forward from previous encounters in EMR including the history, exam, and assessment/plan has been reviewed and is unchanged unless noted otherwise.    Cardiac medicines reviewed with risk, benefits, and necessity of each discussed.    Risk and benefit of cardiac testing reviewed including death heart attack stroke pain bleeding infection need for vascular /cardiovascular surgery were discussed and the patient     Objective:         /93 (BP Location: Left arm, Patient Position: Sitting)   Pulse 79   Resp 18   Ht 152.4 cm (60\")   Wt 49 kg (108 lb)   BMI 21.09 kg/m²         The pleasure to be involved in this patient's cardiovascular care.  Please call with any questions or concerns  Neri Ugarte MD, PhD    Most recent EKG as reviewed and interpreted by me:  Procedures     Most recent echo as reviewed and interpreted by me:  Results for orders placed during the hospital encounter of 02/15/22    Adult Transthoracic Echo Complete W/ Cont if Necessary Per Protocol    Interpretation Summary  · Left ventricular systolic function is normal.  · Left ventricular ejection fraction is 60 to 65%  · Left ventricular wall thickness is consistent with mild concentric hypertrophy.  · Left ventricular diastolic function is consistent with (grade I) impaired relaxation.      Most " recent stress test as reviewed and interpreted by me:  Results for orders placed during the hospital encounter of 02/15/22    Stress Test With Myocardial Perfusion One Day    Interpretation Summary  · Diaphragmatic attenuation and GI artifacts are present.  · Left ventricular ejection fraction is hyperdynamic (Calculated EF > 70%). (Calculated EF = 70%).  · Myocardial perfusion imaging indicates a normal myocardial perfusion study with no evidence of ischemia.  · Impressions are consistent with a low risk study.  · There is no prior study available for comparison. Fixed inferoseptal defect c/w more GI/diaphragmatic attenuation, better with stress. EF 70%.  · Findings consistent with an equivocal ECG stress test.    Low risk stress  Fixed inferoseptal defect c/w more GI/diaphragmatic attenuation, better with stress. EF 70%  No reversibility  No ECG changes c/w ischemia      Most recent cardiac catheterization as reviewed interpreted by me:  No results found for this or any previous visit.    The following portions of the patient's history were reviewed and updated as appropriate: allergies, current medications, past family history, past medical history, past social history, past surgical history and problem list.      ROS:  14 point review of systems negative except as mentioned above    Current Outpatient Medications:   •  amLODIPine (NORVASC) 2.5 MG tablet, Take 1 tablet by mouth Daily., Disp: 90 tablet, Rfl: 3  •  aspirin 81 MG chewable tablet, Chew 81 mg Daily., Disp: , Rfl:   •  clopidogrel (PLAVIX) 75 MG tablet, Daily., Disp: , Rfl:   •  docusate sodium (COLACE) 100 MG capsule, Take 200 mg by mouth 2 (Two) Times a Day., Disp: , Rfl:   •  Fluticasone-Umeclidin-Vilant (TRELEGY) 100-62.5-25 MCG/INH inhaler, Inhale 1 puff Daily., Disp: , Rfl:   •  gabapentin (NEURONTIN) 300 MG capsule, TAKE 1 CAPSULE BY MOUTH EVERY DAY AT BEDTIME, Disp: 90 capsule, Rfl: 2  •  isosorbide mononitrate (IMDUR) 30 MG 24 hr tablet, TAKE  1 TABLET BY MOUTH DAILY, Disp: 90 tablet, Rfl: 1  •  lisinopril-hydrochlorothiazide (PRINZIDE,ZESTORETIC) 20-12.5 MG per tablet, TAKE 1 TABLET BY MOUTH DAILY, Disp: 90 tablet, Rfl: 1  •  meloxicam (MOBIC) 15 MG tablet, Take 15 mg by mouth Daily., Disp: , Rfl:   •  omeprazole (priLOSEC) 40 MG capsule, Take 40 mg by mouth Daily., Disp: , Rfl:   •  ondansetron (ZOFRAN) 4 MG tablet, Take 4 mg by mouth Every 8 (Eight) Hours As Needed for Nausea or Vomiting., Disp: , Rfl:   •  simvastatin (ZOCOR) 10 MG tablet, Take 10 mg by mouth Every Night., Disp: , Rfl:     Problem List:  Patient Active Problem List   Diagnosis   • Chest pain   • Essential hypertension   • Coronary artery disease of native artery of native heart with stable angina pectoris (HCC)   • Mixed hyperlipidemia   • Current smoker   • Anxiety disorder   • Cerebrovascular accident (CVA) (HCC)   • Chronic obstructive pulmonary disease (HCC)   • Gastroesophageal reflux disease   • Tobacco dependence syndrome     Past Medical History:  Past Medical History:   Diagnosis Date   • Allergic    • Angina pectoris (HCC)    • Arthritis    • Asthma    • Breast cyst    • COPD (chronic obstructive pulmonary disease) (HCC)    • Coronary artery disease    • Fibrocystic breast    • GERD (gastroesophageal reflux disease)    • Hyperlipidemia    • Hypertension    • Injury of back    • Stroke (HCC)     ROM slightly limited-nerve damage     Past Surgical History:  Past Surgical History:   Procedure Laterality Date   • BREAST CYST EXCISION     • CARDIAC CATHETERIZATION      has had 2- sees Dr. Cadet   • HYSTERECTOMY       Social History:  Social History     Socioeconomic History   • Marital status:    Tobacco Use   • Smoking status: Every Day     Packs/day: 1.50     Types: Cigarettes   • Smokeless tobacco: Never   Vaping Use   • Vaping Use: Never used   Substance and Sexual Activity   • Alcohol use: No   • Drug use: Never   • Sexual activity: Defer     Allergies:  Allergies    Allergen Reactions   • Neomycin-Bacitracin Zn-Polymyx Rash   • Codeine GI Intolerance     Immunizations:  Immunization History   Administered Date(s) Administered   • FluLaval/Fluzone >6mos 10/17/2019   • Fluzone High Dose =>65 Years ([facility] ONLY) 09/30/2015, 08/12/2016, 01/06/2018, 10/25/2018   • Influenza Quad Vaccine (Inpatient) 10/12/2014   • Influenza, Unspecified 09/09/2017   • Pneumococcal Conjugate 13-Valent (PCV13) 06/12/2015   • Pneumococcal Polysaccharide (PPSV23) 06/16/2016, 07/15/2016   • Zostavax 06/12/2015, 09/30/2015            In-Office Procedure(s):  No orders to display        ASCVD RIsk Score::  The ASCVD Risk score (Hernandez DK, et al., 2019) failed to calculate for the following reasons:    The patient has a prior MI or stroke diagnosis    Imaging:    Results for orders placed during the hospital encounter of 05/17/20    XR Chest 1 View    Narrative  XR CHEST 1 VW-    Date of Exam: 5/17/2020 7:45 AM    Indication: Chest pain protocol.    Comparison Exams: 01/07/2020    Technique: Single AP chest radiograph    FINDINGS:  The lungs are clear, with note of a few calcified granulomas. The heart  and mediastinal contours appear normal. The pulmonary vasculature  appears normal. The osseous structures appear intact.    Impression  No acute cardiopulmonary process identified.    Electronically Signed By-DR. Long Mazariegos MD On:5/17/2020 8:12 AM  This report was finalized on [id number] by DR. Long Mazariegos MD.       Results for orders placed during the hospital encounter of 03/14/22    US Abdomen Limited    Narrative  US ABDOMEN LIMITED-    Date of Exam: 3/14/2022 7:29 AM    Indication: K59.09; K59.09-Other constipation.    Comparison: None available.    Technique: Transverse and sagittal ultrasound images of the right upper  quadrant were obtained. Doppler evaluation was also conducted.    FINDINGS:  The liver size is within normal limits, 15.2 cm in length. Liver  maintains normal  homogeneous echotexture without focal or suspicious  abnormality. Main portal vein is patent with hepatopedal flow. The  imaged hepatic veins are patent. Liver does not appear grossly cirrhotic  or steatotic.    The gallbladder is free of shadowing stone, sludge, wall thickening or  pericholecystic fluid. The common bile duct caliber is normal at 3 mm.  No intrahepatic biliary ductal dilation is seen.    Right kidney measures 11.0 cm in length without focal cortical lesion,  shadowing stone, or hydronephrosis.    No ascites is identified. Imaged hepatic veins are patent.    The pancreas appears grossly unremarkable.    Impression  1. Normal right upper quadrant abdominal ultrasound.    Electronically Signed By-Patricia Longoria MD On:3/14/2022 8:43 AM  This report was finalized on [id number] by  Patricia Longoria MD.      Results for orders placed during the hospital encounter of 09/20/21    CT Head Without Contrast    Narrative  EXAMINATION: CT HEAD WO CONTRAST    DATE: 9/21/2021 12:58 AM    INDICATION: Fall today with dizziness. History of prior right-sided stroke.    COMPARISON: CT brain, 5/23/2015    TECHNIQUE: Thin section noncontrast axial images were obtained through the head. Coronal reformatted images were created.  CT dose lowering techniques were used, to include: automated exposure control, adjustment for patient size, and or use of iterative  reconstruction    FINDINGS:  Intracranial contents:    Parenchymal volume is normal. No hydrocephalus.   A few foci of hypoattenuation within periventricular white matter most commonly represent chronic microangiopathy. Chronic lacunar infarct in the left thalamus. There is no midline shift or mass effect.  No hemorrhage, mass lesion, or evidence of evolving large territorial infarct.    Bones and extracranial soft tissues:    Normal calvarium.  Orbits unremarkable. The visualized paranasal sinuses are clear. No mastoid or middle ear effusions.    Impression  1.  No  hemorrhage or acute intracranial abnormality.    2.  Chronic findings as above.    Electronically signed by:  Kristian Ernst  9/20/2021 11:43 PM      Lab Review:   No visits with results within 6 Month(s) from this visit.   Latest known visit with results is:   Hospital Outpatient Visit on 02/15/2022   Component Date Value   • Target HR (85%) 02/15/2022 125    • Max. Pred. HR (100%) 02/15/2022 147    • BH CV STRESS PROTOCOL 1 02/15/2022 Pharmacologic    • Stage 1 02/15/2022 1    • HR Stage 1 02/15/2022 74    • BP Stage 1 02/15/2022 133/80    • Duration Min Stage 1 02/15/2022 0    • Duration Sec Stage 1 02/15/2022 10    • Stress Dose Regadenoson * 02/15/2022 0.4    • Stress Comments Stage 1 02/15/2022 10 sec bolus injection    • Stage 2 02/15/2022 2    • HR Stage 2 02/15/2022 98    • BP Stage 2 02/15/2022 131/82    • Duration Min Stage 2 02/15/2022 4    • Duration Sec Stage 2 02/15/2022 0    • Stress Comments Stage 2 02/15/2022 recovery    • Baseline HR 02/15/2022 74    • Baseline BP 02/15/2022 133/80    • Peak HR 02/15/2022 102    • Percent Max Pred HR 02/15/2022 69.39    • Percent Target HR 02/15/2022 82    • Peak BP 02/15/2022 153/83    • Recovery HR 02/15/2022 90    • Recovery BP 02/15/2022 141/84    • BH CV REST NUCLEAR ISOTO* 02/15/2022 7.9    • BH CV STRESS NUCLEAR ISO* 02/15/2022 21.9    • Nuc Stress EF 02/15/2022 70      Recent labs reviewed and interpreted for clinical significance and application            Level of Care:           Neri Ugarte MD  02/22/23  .

## 2023-03-07 RX ORDER — ISOSORBIDE MONONITRATE 30 MG/1
30 TABLET, EXTENDED RELEASE ORAL DAILY
Qty: 90 TABLET | Refills: 1 | Status: SHIPPED | OUTPATIENT
Start: 2023-03-07

## 2023-03-07 RX ORDER — AMLODIPINE BESYLATE 2.5 MG/1
2.5 TABLET ORAL DAILY
Qty: 90 TABLET | Refills: 3 | Status: SHIPPED | OUTPATIENT
Start: 2023-03-07

## 2023-05-30 RX ORDER — LISINOPRIL AND HYDROCHLOROTHIAZIDE 20; 12.5 MG/1; MG/1
1 TABLET ORAL DAILY
Qty: 90 TABLET | Refills: 1 | Status: SHIPPED | OUTPATIENT
Start: 2023-05-30

## 2023-08-29 RX ORDER — ISOSORBIDE MONONITRATE 30 MG/1
30 TABLET, EXTENDED RELEASE ORAL DAILY
Qty: 90 TABLET | Refills: 1 | Status: SHIPPED | OUTPATIENT
Start: 2023-08-29

## 2023-11-27 RX ORDER — LISINOPRIL AND HYDROCHLOROTHIAZIDE 20; 12.5 MG/1; MG/1
1 TABLET ORAL DAILY
Qty: 90 TABLET | Refills: 1 | Status: SHIPPED | OUTPATIENT
Start: 2023-11-27

## 2024-06-17 ENCOUNTER — TRANSCRIBE ORDERS (OUTPATIENT)
Dept: ADMINISTRATIVE | Facility: HOSPITAL | Age: 74
End: 2024-06-17
Payer: MEDICARE

## 2024-06-17 DIAGNOSIS — R91.1 NODULE OF LOWER LOBE OF RIGHT LUNG: Primary | ICD-10-CM

## 2024-07-11 ENCOUNTER — HOSPITAL ENCOUNTER (OUTPATIENT)
Dept: CT IMAGING | Facility: HOSPITAL | Age: 74
Discharge: HOME | End: 2024-07-11
Admitting: FAMILY MEDICINE
Payer: MEDICARE

## 2024-07-11 DIAGNOSIS — R91.1 NODULE OF LOWER LOBE OF RIGHT LUNG: ICD-10-CM

## 2024-07-11 PROCEDURE — 25510000001 IOPAMIDOL PER 1 ML: Performed by: FAMILY MEDICINE

## 2024-07-11 PROCEDURE — 71260 CT THORAX DX C+: CPT

## 2024-07-11 RX ADMIN — IOPAMIDOL 100 ML: 755 INJECTION, SOLUTION INTRAVENOUS at 12:20

## 2024-07-24 ENCOUNTER — TRANSCRIBE ORDERS (OUTPATIENT)
Dept: ADMINISTRATIVE | Facility: HOSPITAL | Age: 74
End: 2024-07-24
Payer: MEDICARE

## 2024-07-24 DIAGNOSIS — R91.1 PULMONARY NODULE: Primary | ICD-10-CM

## 2024-07-31 ENCOUNTER — HOSPITAL ENCOUNTER (OUTPATIENT)
Dept: PET IMAGING | Facility: HOSPITAL | Age: 74
Discharge: HOME | End: 2024-07-31
Payer: MEDICARE

## 2024-08-01 ENCOUNTER — HOSPITAL ENCOUNTER (OUTPATIENT)
Dept: PET IMAGING | Facility: HOSPITAL | Age: 74
Discharge: HOME | End: 2024-08-01
Admitting: FAMILY MEDICINE
Payer: MEDICARE

## 2024-08-01 DIAGNOSIS — R91.1 PULMONARY NODULE: ICD-10-CM

## 2024-08-01 LAB — GLUCOSE BLDC GLUCOMTR-MCNC: 86 MG/DL (ref 70–105)

## 2024-08-01 PROCEDURE — 82948 REAGENT STRIP/BLOOD GLUCOSE: CPT

## 2024-08-01 PROCEDURE — 0 FLUDEOXYGLUCOSE F18 SOLUTION: Performed by: FAMILY MEDICINE

## 2024-08-01 PROCEDURE — A9552 F18 FDG: HCPCS | Performed by: FAMILY MEDICINE

## 2024-08-01 PROCEDURE — 78815 PET IMAGE W/CT SKULL-THIGH: CPT

## 2024-08-01 RX ADMIN — FLUDEOXYGLUCOSE F 18 1 DOSE: 200 INJECTION, SOLUTION INTRAVENOUS at 10:25

## 2024-08-14 ENCOUNTER — TRANSCRIBE ORDERS (OUTPATIENT)
Dept: ADMINISTRATIVE | Facility: HOSPITAL | Age: 74
End: 2024-08-14
Payer: MEDICARE

## 2024-08-14 DIAGNOSIS — R91.1 SOLITARY PULMONARY NODULE: Primary | ICD-10-CM

## 2024-08-22 RX ORDER — METOPROLOL SUCCINATE 25 MG/1
25 TABLET, EXTENDED RELEASE ORAL DAILY
COMMUNITY

## 2024-08-29 ENCOUNTER — HOSPITAL ENCOUNTER (OUTPATIENT)
Dept: GENERAL RADIOLOGY | Facility: HOSPITAL | Age: 74
Discharge: HOME | End: 2024-08-29
Payer: MEDICARE

## 2024-08-29 ENCOUNTER — HOSPITAL ENCOUNTER (OUTPATIENT)
Dept: CT IMAGING | Facility: HOSPITAL | Age: 74
Discharge: HOME | End: 2024-08-29
Payer: MEDICARE

## 2024-08-29 VITALS
DIASTOLIC BLOOD PRESSURE: 98 MMHG | RESPIRATION RATE: 20 BRPM | TEMPERATURE: 97.7 F | HEART RATE: 79 BPM | OXYGEN SATURATION: 99 % | SYSTOLIC BLOOD PRESSURE: 141 MMHG | HEIGHT: 60 IN | BODY MASS INDEX: 20.62 KG/M2 | WEIGHT: 105 LBS

## 2024-08-29 DIAGNOSIS — R91.1 NODULE OF LOWER LOBE OF RIGHT LUNG: ICD-10-CM

## 2024-08-29 DIAGNOSIS — R91.1 SOLITARY PULMONARY NODULE: ICD-10-CM

## 2024-08-29 LAB
APTT PPP: 29.3 SECONDS (ref 24–31)
BASOPHILS # BLD AUTO: 0.05 10*3/MM3 (ref 0–0.2)
BASOPHILS NFR BLD AUTO: 0.8 % (ref 0–1.5)
DEPRECATED RDW RBC AUTO: 49.4 FL (ref 37–54)
EOSINOPHIL # BLD AUTO: 0.35 10*3/MM3 (ref 0–0.4)
EOSINOPHIL NFR BLD AUTO: 5.3 % (ref 0.3–6.2)
ERYTHROCYTE [DISTWIDTH] IN BLOOD BY AUTOMATED COUNT: 14.4 % (ref 12.3–15.4)
HCT VFR BLD AUTO: 43.3 % (ref 34–46.6)
HGB BLD-MCNC: 13.9 G/DL (ref 12–15.9)
IMM GRANULOCYTES # BLD AUTO: 0.01 10*3/MM3 (ref 0–0.05)
IMM GRANULOCYTES NFR BLD AUTO: 0.2 % (ref 0–0.5)
INR PPP: 0.93 (ref 0.93–1.1)
LYMPHOCYTES # BLD AUTO: 1.82 10*3/MM3 (ref 0.7–3.1)
LYMPHOCYTES NFR BLD AUTO: 27.4 % (ref 19.6–45.3)
MCH RBC QN AUTO: 30 PG (ref 26.6–33)
MCHC RBC AUTO-ENTMCNC: 32.1 G/DL (ref 31.5–35.7)
MCV RBC AUTO: 93.5 FL (ref 79–97)
MONOCYTES # BLD AUTO: 0.72 10*3/MM3 (ref 0.1–0.9)
MONOCYTES NFR BLD AUTO: 10.8 % (ref 5–12)
NEUTROPHILS NFR BLD AUTO: 3.69 10*3/MM3 (ref 1.7–7)
NEUTROPHILS NFR BLD AUTO: 55.5 % (ref 42.7–76)
NRBC BLD AUTO-RTO: 0 /100 WBC (ref 0–0.2)
PLATELET # BLD AUTO: 245 10*3/MM3 (ref 140–450)
PMV BLD AUTO: 8.5 FL (ref 6–12)
PROTHROMBIN TIME: 10.2 SECONDS (ref 9.6–11.7)
RBC # BLD AUTO: 4.63 10*6/MM3 (ref 3.77–5.28)
WBC NRBC COR # BLD AUTO: 6.64 10*3/MM3 (ref 3.4–10.8)

## 2024-08-29 PROCEDURE — 25010000002 MIDAZOLAM PER 1 MG

## 2024-08-29 PROCEDURE — 25010000002 LIDOCAINE 1 % SOLUTION

## 2024-08-29 PROCEDURE — 25010000002 ONDANSETRON PER 1 MG: Performed by: RADIOLOGY

## 2024-08-29 PROCEDURE — 71045 X-RAY EXAM CHEST 1 VIEW: CPT

## 2024-08-29 PROCEDURE — 85730 THROMBOPLASTIN TIME PARTIAL: CPT | Performed by: RADIOLOGY

## 2024-08-29 PROCEDURE — 25010000002 FENTANYL CITRATE (PF) 50 MCG/ML SOLUTION

## 2024-08-29 PROCEDURE — 85025 COMPLETE CBC W/AUTO DIFF WBC: CPT | Performed by: RADIOLOGY

## 2024-08-29 PROCEDURE — 99152 MOD SED SAME PHYS/QHP 5/>YRS: CPT

## 2024-08-29 PROCEDURE — 85610 PROTHROMBIN TIME: CPT | Performed by: RADIOLOGY

## 2024-08-29 PROCEDURE — 25810000003 SODIUM CHLORIDE 0.9 % SOLUTION: Performed by: RADIOLOGY

## 2024-08-29 RX ORDER — ONDANSETRON 2 MG/ML
INJECTION INTRAMUSCULAR; INTRAVENOUS AS NEEDED
Status: COMPLETED | OUTPATIENT
Start: 2024-08-29 | End: 2024-08-29

## 2024-08-29 RX ORDER — SODIUM CHLORIDE 9 MG/ML
75 INJECTION, SOLUTION INTRAVENOUS CONTINUOUS
Status: DISCONTINUED | OUTPATIENT
Start: 2024-08-29 | End: 2024-08-31 | Stop reason: HOSPADM

## 2024-08-29 RX ORDER — FENTANYL CITRATE 50 UG/ML
INJECTION, SOLUTION INTRAMUSCULAR; INTRAVENOUS AS NEEDED
Status: COMPLETED | OUTPATIENT
Start: 2024-08-29 | End: 2024-08-29

## 2024-08-29 RX ORDER — SODIUM CHLORIDE 0.9 % (FLUSH) 0.9 %
10 SYRINGE (ML) INJECTION AS NEEDED
Status: DISCONTINUED | OUTPATIENT
Start: 2024-08-29 | End: 2024-08-31 | Stop reason: HOSPADM

## 2024-08-29 RX ORDER — SODIUM CHLORIDE 0.9 % (FLUSH) 0.9 %
10 SYRINGE (ML) INJECTION EVERY 12 HOURS SCHEDULED
Status: DISCONTINUED | OUTPATIENT
Start: 2024-08-29 | End: 2024-08-31 | Stop reason: HOSPADM

## 2024-08-29 RX ORDER — MIDAZOLAM HYDROCHLORIDE 1 MG/ML
INJECTION INTRAMUSCULAR; INTRAVENOUS AS NEEDED
Status: COMPLETED | OUTPATIENT
Start: 2024-08-29 | End: 2024-08-29

## 2024-08-29 RX ORDER — LIDOCAINE HYDROCHLORIDE 10 MG/ML
INJECTION, SOLUTION INFILTRATION; PERINEURAL AS NEEDED
Status: COMPLETED | OUTPATIENT
Start: 2024-08-29 | End: 2024-08-29

## 2024-08-29 RX ADMIN — FENTANYL CITRATE 100 MCG: 50 INJECTION, SOLUTION INTRAMUSCULAR; INTRAVENOUS at 09:32

## 2024-08-29 RX ADMIN — SODIUM CHLORIDE 75 ML/HR: 9 INJECTION, SOLUTION INTRAVENOUS at 08:03

## 2024-08-29 RX ADMIN — LIDOCAINE HYDROCHLORIDE 4 ML: 10 INJECTION, SOLUTION INFILTRATION; PERINEURAL at 09:36

## 2024-08-29 RX ADMIN — MIDAZOLAM 1 MG: 1 INJECTION INTRAMUSCULAR; INTRAVENOUS at 09:32

## 2024-08-29 RX ADMIN — Medication 10 ML: at 08:03

## 2024-08-29 RX ADMIN — ONDANSETRON 4 MG: 2 INJECTION INTRAMUSCULAR; INTRAVENOUS at 09:25

## 2024-08-29 NOTE — NURSING NOTE
Pt discharged home after instructions given and questions answered.  Transported via wheelchair to car and assisted into car without incidence.

## 2024-08-29 NOTE — H&P
[facility]   Interventional Radiology H&P    Patient Name: Lucrecia Ramesh  : 1950  MRN: [id number]  Primary Care Physician:  Eugene Swanson MD  Referring Physician: Eugene Swanson MD  Date of admission: 2024    Subjective   Subjective     HPI:  Lucrecia Ramesh is a 74 y.o. female with RLL lung nodule.    Review of Systems:   Constitutional no fever,  no weight loss       Otolaryngeal no difficulty swallowing   Cardiovascular no chest pain   Pulmonary no cough, no sputum production   Gastrointestinal no constipation, no diarrhea                         Personal History       Past Medical/Surgical History:   Past Medical History:   Diagnosis Date    Allergic     Angina pectoris     Arthritis     Asthma     Breast cyst     COPD (chronic obstructive pulmonary disease)     Coronary artery disease     Fibrocystic breast     GERD (gastroesophageal reflux disease)     Hyperlipidemia     Hypertension     Injury of back     Stroke     ROM slightly limited-nerve damage     Past Surgical History:   Procedure Laterality Date    BREAST CYST EXCISION      CARDIAC CATHETERIZATION      has had 2- sees Dr. Cadet    HYSTERECTOMY         Social History:  reports that she has been smoking cigarettes. She has never used smokeless tobacco. She reports that she does not drink alcohol and does not use drugs.    Medications:  (Not in a hospital admission)    Current medications:  sodium chloride, 10 mL, Intravenous, Q12H      Current IV drips:  sodium chloride, 75 mL/hr, Last Rate: 75 mL/hr (24 0803)        Allergies:  Allergies   Allergen Reactions    Neomycin-Bacitracin Zn-Polymyx Rash    Codeine GI Intolerance       Objective    Objective     Vitals:   Temp:  [97.7 °F (36.5 °C)] 97.7 °F (36.5 °C)  Heart Rate:  [75] 75  Resp:  [15] 15  BP: (216)/(99) 216/99      Physical Exam:   Constitutional: Awake, alert, No acute distress    Respiratory: Clear to auscultation bilaterally, nonlabored respirations  "   Cardiovascular: RRR, no murmurs, rubs, or gallops, palpable pedal pulses bilaterally   Gastrointestinal: Positive bowel sounds, soft, nontender, nondistended        ASA SCALE ASSESSMENT:  2-Mild to moderate systemic disease, medically well controlled, with no functional limitation    MALLAMPATI CLASSIFICATION:  2-Able to visualize the soft palate, fauces, uvula. The anterior & posterior tonsilar pillars are hidden by the tongue.       Result Review        Result Review:     No results found for: \"NA\"    No results found for: \"K\"    No results found for: \"CL\"    No results found for: \"PLASMABICARB\"    No results found for: \"BUN\"    No results found for: \"CREATININE\"    No results found for: \"CALCIUM\"        No components found for: \"GLUCOSE.*\"  Results from last 7 days   Lab Units 08/29/24  0751   WBC 10*3/mm3 6.64   HEMOGLOBIN g/dL 13.9   HEMATOCRIT % 43.3   PLATELETS 10*3/mm3 245      Results from last 7 days   Lab Units 08/29/24  0751   INR  0.93           Assessment / Plan     Assesment:   RLL lung nodule.      Plan:   CT guided RLL lung nodule biopsy.     The risks and benefits of the procedure were discussed with the patient.    Electronically signed by TYLER Jade, 08/29/24, 8:58 AM EDT.  "

## 2024-08-29 NOTE — DISCHARGE INSTRUCTIONS
A responsible adult should stay with you and you should rest quietly for the rest of the day. Do not drink alcohol, drive or cook for 24 hours following your procedure.  Progress your diet as tolerated.  Resume your usual medications including aspirin.  When you remove your dressing in 48 hours, a small amount of blood is to be expected. Do not be alarmed.  If you feel it is bleeding excessively apply pressure and proceed to the Emergency room.  Do not shower, bath, or get your dressing wet at all for 48 hours.  You may shower after the dressing is removed. No lifting more that 10 pounds for 48 hours.  If severe pain, increased shortness of air or racing heartbeat occur, seek immediate medical attention.  Follow up with Dr. Swanson for results.

## 2024-09-03 LAB
LAB AP CASE REPORT: NORMAL
Lab: NORMAL
PATH REPORT.FINAL DX SPEC: NORMAL
PATH REPORT.GROSS SPEC: NORMAL

## 2025-05-11 ENCOUNTER — HOSPITAL ENCOUNTER (EMERGENCY)
Facility: HOSPITAL | Age: 75
Discharge: HOME | End: 2025-05-11
Admitting: EMERGENCY MEDICINE
Payer: MEDICARE

## 2025-05-11 VITALS
WEIGHT: 103.4 LBS | HEIGHT: 60 IN | HEART RATE: 88 BPM | TEMPERATURE: 97.8 F | OXYGEN SATURATION: 95 % | RESPIRATION RATE: 18 BRPM | SYSTOLIC BLOOD PRESSURE: 166 MMHG | DIASTOLIC BLOOD PRESSURE: 86 MMHG | BODY MASS INDEX: 20.3 KG/M2

## 2025-05-11 DIAGNOSIS — S13.9XXA NECK SPRAIN, INITIAL ENCOUNTER: Primary | ICD-10-CM

## 2025-05-11 PROCEDURE — 99282 EMERGENCY DEPT VISIT SF MDM: CPT

## 2025-05-11 PROCEDURE — 25010000002 KETOROLAC TROMETHAMINE PER 15 MG

## 2025-05-11 PROCEDURE — 25010000002 ORPHENADRINE CITRATE PER 60 MG

## 2025-05-11 PROCEDURE — 96372 THER/PROPH/DIAG INJ SC/IM: CPT

## 2025-05-11 RX ORDER — ORPHENADRINE CITRATE 30 MG/ML
60 INJECTION INTRAMUSCULAR; INTRAVENOUS ONCE
Status: COMPLETED | OUTPATIENT
Start: 2025-05-11 | End: 2025-05-11

## 2025-05-11 RX ORDER — METHOCARBAMOL 500 MG/1
500 TABLET, FILM COATED ORAL 3 TIMES DAILY PRN
Qty: 10 TABLET | Refills: 0 | Status: SHIPPED | OUTPATIENT
Start: 2025-05-11

## 2025-05-11 RX ORDER — METHYLPREDNISOLONE 4 MG/1
TABLET ORAL
Qty: 21 TABLET | Refills: 0 | Status: SHIPPED | OUTPATIENT
Start: 2025-05-11

## 2025-05-11 RX ORDER — KETOROLAC TROMETHAMINE 30 MG/ML
15 INJECTION, SOLUTION INTRAMUSCULAR; INTRAVENOUS ONCE
Status: COMPLETED | OUTPATIENT
Start: 2025-05-11 | End: 2025-05-11

## 2025-05-11 RX ADMIN — ORPHENADRINE CITRATE 60 MG: 60 INJECTION INTRAMUSCULAR; INTRAVENOUS at 15:58

## 2025-05-11 RX ADMIN — KETOROLAC TROMETHAMINE 15 MG: 30 INJECTION, SOLUTION INTRAMUSCULAR at 15:58

## 2025-05-11 NOTE — DISCHARGE INSTRUCTIONS
Muscle relaxer and steroids as directed.    You can take over-the-counter Tylenol with your meloxicam and gabapentin.    Follow-up with primary care, call to make an appointment.  Return to the ER for any new or worsening symptoms.

## 2025-05-11 NOTE — ED PROVIDER NOTES
Subjective   Chief Complaint   Patient presents with    Neck Pain       History of Present Illness  Patient is a 74-year-old lady who arrives today with her daughter with reports of neck pain after sleeping funny the other night.  States that she did this a few years ago and came in here and got a shot and felt much better.  Would like the same today.  Review of Systems   Musculoskeletal:  Positive for arthralgias, myalgias and neck pain.       Past Medical History:   Diagnosis Date    Allergic     Angina pectoris     Arthritis     Asthma     Breast cyst     COPD (chronic obstructive pulmonary disease)     Coronary artery disease     Fibrocystic breast     GERD (gastroesophageal reflux disease)     Hyperlipidemia     Hypertension     Injury of back     Stroke     ROM slightly limited-nerve damage       Allergies   Allergen Reactions    Neomycin-Bacitracin Zn-Polymyx Rash    Codeine GI Intolerance       Past Surgical History:   Procedure Laterality Date    BREAST CYST EXCISION      CARDIAC CATHETERIZATION      has had 2- sees Dr. Cadet    HYSTERECTOMY         Family History   Problem Relation Age of Onset    Ovarian cancer Daughter     Heart failure Mother        Social History     Socioeconomic History    Marital status:    Tobacco Use    Smoking status: Every Day     Current packs/day: 1.50     Types: Cigarettes    Smokeless tobacco: Never   Vaping Use    Vaping status: Never Used   Substance and Sexual Activity    Alcohol use: No    Drug use: Never    Sexual activity: Defer           Objective   Physical Exam  Vitals and nursing note reviewed.   Constitutional:       General: She is not in acute distress.     Appearance: Normal appearance. She is normal weight. She is not ill-appearing, toxic-appearing or diaphoretic.   HENT:      Head: Normocephalic and atraumatic.      Right Ear: External ear normal.      Left Ear: External ear normal.      Nose: Nose normal.      Mouth/Throat:      Mouth: Mucous  "membranes are moist.      Pharynx: Oropharynx is clear.   Eyes:      Extraocular Movements: Extraocular movements intact.      Conjunctiva/sclera: Conjunctivae normal.      Pupils: Pupils are equal, round, and reactive to light.   Neck:      Vascular: No carotid bruit.      Trachea: Trachea and phonation normal.      Comments: No vertebral point tenderness noted to the C-spine T-spine or L-spine.  No palpable step-offs.  No soft tissue tenderness is noted.  No skin abnormalities are appreciated.  No saddle anesthesia  Cardiovascular:      Rate and Rhythm: Normal rate and regular rhythm.      Pulses: Normal pulses.      Heart sounds: Normal heart sounds.   Pulmonary:      Effort: Pulmonary effort is normal.      Breath sounds: Normal breath sounds.   Abdominal:      General: Bowel sounds are normal.      Palpations: Abdomen is soft.   Musculoskeletal:      Cervical back: Neck supple. Tenderness present. No edema, erythema, signs of trauma, rigidity, torticollis or crepitus. Pain with movement present. No spinous process tenderness. Decreased range of motion.   Lymphadenopathy:      Cervical: No cervical adenopathy.   Skin:     General: Skin is warm and dry.      Capillary Refill: Capillary refill takes less than 2 seconds.   Neurological:      General: No focal deficit present.      Mental Status: She is alert and oriented to person, place, and time.   Psychiatric:         Mood and Affect: Mood normal.         Behavior: Behavior normal.         Thought Content: Thought content normal.         Judgment: Judgment normal.         Procedures           ED Course        /86   Pulse 88   Temp 97.8 °F (36.6 °C) (Oral)   Resp 18   Ht 152.4 cm (60\")   Wt 46.9 kg (103 lb 6.3 oz)   SpO2 95%   BMI 20.19 kg/m²   Labs Reviewed - No data to display  Medications   orphenadrine (NORFLEX) injection 60 mg (60 mg Intramuscular Given 5/11/25 4578)   ketorolac (TORADOL) injection 15 mg (15 mg Intramuscular Given 5/11/25 1558) "     No radiology results for the last day                                                   Medical Decision Making  Problems Addressed:  Neck sprain, initial encounter: complicated acute illness or injury    Risk  Prescription drug management.    Patient presents to the ED for the above complaint, underwent the above exam and workup.  Upon evaluation of patient she was given Norflex and Toradol injections.  She reports near complete resolution of discomfort and is able to move her head left and right which is marked improvement for her.  She is able to ambulate with a steady non-ataxic gait.  She has a ride home and wishes to be discharged at this time.  Return precautions were discussed with patient as well as follow-up with primary care and she is agreeable to this.    Patient was treated with the following medications while in the ED;   Medications   orphenadrine (NORFLEX) injection 60 mg (60 mg Intramuscular Given 5/11/25 1558)   ketorolac (TORADOL) injection 15 mg (15 mg Intramuscular Given 5/11/25 1558)         Consideration was given for admission, but the patient was stable for outpatient management as patient was ambulatory, nontoxic, stable, and afebrile.  Exam as above.    Disposition: Discussed need to follow-up diagnostics, including incidental findings.  Discharged with instructions to obtain outpatient follow-up with patient's symptoms and findings, with strict return precautions if patient develops new or worsening symptoms.    This document is intended for medical expert use only. Reading of this document by patients and/or patient's family without participating medical staff guidance may result in misinterpretation and unintended morbidity.  Any interpretation of such data is the responsibility of the patient and/or family member responsible for the patient in concert with their primary or specialist providers, not to be left for sources of online searches such as [employer], [employer] or similar queries.  Relying on these approaches to knowledge may result in misinterpretation, misguided goals of care and even death should patients or family members try recommendations outside of the realm of professional medical care in a supervised inpatient environment.       Final diagnoses:   Neck sprain, initial encounter       ED Disposition  ED Disposition       ED Disposition   Discharge    Condition   Stable    Comment   --               Eugene Swanson MD  [address] [facility] [place] [address] [phone]               Medication List        New Prescriptions      methocarbamol 500 MG tablet  Commonly known as: ROBAXIN  Take 1 tablet by mouth 3 (Three) Times a Day As Needed for Muscle Spasms for up to 10 doses.     methylPREDNISolone 4 MG dose pack  Commonly known as: MEDROL  Take as directed on package instructions.               Where to Get Your Medications        These medications were sent to [id number] - [place], [place] - [address] AT [facility] - [phone]  - [phone]   [address] [facility] [place] [address]      Phone: [phone]   methocarbamol 500 MG tablet  methylPREDNISolone 4 MG dose pack            Paris Melo, APRN  05/12/25 0227

## 2025-07-05 ENCOUNTER — HOSPITAL ENCOUNTER (INPATIENT)
Facility: HOSPITAL | Age: 75
LOS: 1 days | Discharge: HOME | End: 2025-07-08
Attending: INTERNAL MEDICINE | Admitting: INTERNAL MEDICINE
Payer: MEDICARE

## 2025-07-05 ENCOUNTER — APPOINTMENT (OUTPATIENT)
Dept: GENERAL RADIOLOGY | Facility: HOSPITAL | Age: 75
End: 2025-07-05
Payer: MEDICARE

## 2025-07-05 ENCOUNTER — APPOINTMENT (OUTPATIENT)
Dept: CT IMAGING | Facility: HOSPITAL | Age: 75
End: 2025-07-05
Payer: MEDICARE

## 2025-07-05 DIAGNOSIS — M25.562 ACUTE PAIN OF LEFT KNEE: ICD-10-CM

## 2025-07-05 DIAGNOSIS — J43.1 PANLOBULAR EMPHYSEMA: ICD-10-CM

## 2025-07-05 DIAGNOSIS — I62.9 INTRACRANIAL HEMORRHAGE: ICD-10-CM

## 2025-07-05 DIAGNOSIS — N30.00 ACUTE CYSTITIS WITHOUT HEMATURIA: ICD-10-CM

## 2025-07-05 DIAGNOSIS — R55 SYNCOPE AND COLLAPSE: Primary | ICD-10-CM

## 2025-07-05 LAB
ALBUMIN SERPL-MCNC: 3.8 G/DL (ref 3.5–5.2)
ALBUMIN/GLOB SERPL: 1.8 G/DL
ALP SERPL-CCNC: 66 U/L (ref 39–117)
ALT SERPL W P-5'-P-CCNC: 9 U/L (ref 1–33)
ANION GAP SERPL CALCULATED.3IONS-SCNC: 10.7 MMOL/L (ref 5–15)
AST SERPL-CCNC: 19 U/L (ref 1–32)
BASOPHILS # BLD AUTO: 0.04 10*3/MM3 (ref 0–0.2)
BASOPHILS NFR BLD AUTO: 0.6 % (ref 0–1.5)
BILIRUB SERPL-MCNC: <0.2 MG/DL (ref 0–1.2)
BUN SERPL-MCNC: 22.1 MG/DL (ref 8–23)
BUN/CREAT SERPL: 16.7 (ref 7–25)
CALCIUM SPEC-SCNC: 9.3 MG/DL (ref 8.6–10.5)
CHLORIDE SERPL-SCNC: 96 MMOL/L (ref 98–107)
CO2 SERPL-SCNC: 25.3 MMOL/L (ref 22–29)
CREAT SERPL-MCNC: 1.32 MG/DL (ref 0.57–1)
D DIMER PPP FEU-MCNC: 1.63 MCGFEU/ML (ref 0–0.77)
DEPRECATED RDW RBC AUTO: 45.1 FL (ref 37–54)
EGFRCR SERPLBLD CKD-EPI 2021: 41.7 ML/MIN/1.73
EOSINOPHIL # BLD AUTO: 0.33 10*3/MM3 (ref 0–0.4)
EOSINOPHIL NFR BLD AUTO: 4.8 % (ref 0.3–6.2)
ERYTHROCYTE [DISTWIDTH] IN BLOOD BY AUTOMATED COUNT: 13.2 % (ref 12.3–15.4)
GLOBULIN UR ELPH-MCNC: 2.1 GM/DL
GLUCOSE SERPL-MCNC: 158 MG/DL (ref 65–99)
HCT VFR BLD AUTO: 39.2 % (ref 34–46.6)
HGB BLD-MCNC: 12.7 G/DL (ref 12–15.9)
IMM GRANULOCYTES # BLD AUTO: 0.01 10*3/MM3 (ref 0–0.05)
IMM GRANULOCYTES NFR BLD AUTO: 0.1 % (ref 0–0.5)
LYMPHOCYTES # BLD AUTO: 1.47 10*3/MM3 (ref 0.7–3.1)
LYMPHOCYTES NFR BLD AUTO: 21.5 % (ref 19.6–45.3)
MCH RBC QN AUTO: 30 PG (ref 26.6–33)
MCHC RBC AUTO-ENTMCNC: 32.4 G/DL (ref 31.5–35.7)
MCV RBC AUTO: 92.5 FL (ref 79–97)
MONOCYTES # BLD AUTO: 0.69 10*3/MM3 (ref 0.1–0.9)
MONOCYTES NFR BLD AUTO: 10.1 % (ref 5–12)
NEUTROPHILS NFR BLD AUTO: 4.29 10*3/MM3 (ref 1.7–7)
NEUTROPHILS NFR BLD AUTO: 62.9 % (ref 42.7–76)
NRBC BLD AUTO-RTO: 0 /100 WBC (ref 0–0.2)
PLATELET # BLD AUTO: 240 10*3/MM3 (ref 140–450)
PMV BLD AUTO: 8.4 FL (ref 6–12)
POTASSIUM SERPL-SCNC: 4.3 MMOL/L (ref 3.5–5.2)
PROT SERPL-MCNC: 5.9 G/DL (ref 6–8.5)
RBC # BLD AUTO: 4.24 10*6/MM3 (ref 3.77–5.28)
SODIUM SERPL-SCNC: 132 MMOL/L (ref 136–145)
TROPONIN T SERPL HS-MCNC: 13 NG/L
WBC NRBC COR # BLD AUTO: 6.83 10*3/MM3 (ref 3.4–10.8)

## 2025-07-05 PROCEDURE — 85379 FIBRIN DEGRADATION QUANT: CPT

## 2025-07-05 PROCEDURE — 80053 COMPREHEN METABOLIC PANEL: CPT

## 2025-07-05 PROCEDURE — 85025 COMPLETE CBC W/AUTO DIFF WBC: CPT

## 2025-07-05 PROCEDURE — 73562 X-RAY EXAM OF KNEE 3: CPT

## 2025-07-05 PROCEDURE — 93005 ELECTROCARDIOGRAM TRACING: CPT | Performed by: INTERNAL MEDICINE

## 2025-07-05 PROCEDURE — 36415 COLL VENOUS BLD VENIPUNCTURE: CPT

## 2025-07-05 PROCEDURE — 99285 EMERGENCY DEPT VISIT HI MDM: CPT

## 2025-07-05 PROCEDURE — 93005 ELECTROCARDIOGRAM TRACING: CPT

## 2025-07-05 PROCEDURE — 84484 ASSAY OF TROPONIN QUANT: CPT

## 2025-07-05 PROCEDURE — 25810000003 SODIUM CHLORIDE 0.9 % SOLUTION

## 2025-07-05 PROCEDURE — 71045 X-RAY EXAM CHEST 1 VIEW: CPT

## 2025-07-05 RX ADMIN — SODIUM CHLORIDE 1000 ML: 9 INJECTION, SOLUTION INTRAVENOUS at 23:58

## 2025-07-05 NOTE — Clinical Note
Level of Care: Telemetry [5]   Admitting Physician: ORLANDO ROJO [[id number]]   Attending Physician: ORLANDO ROJO [[id number]]   Bed Request Comments: KIM

## 2025-07-06 ENCOUNTER — APPOINTMENT (OUTPATIENT)
Dept: CT IMAGING | Facility: HOSPITAL | Age: 75
End: 2025-07-06
Payer: MEDICARE

## 2025-07-06 ENCOUNTER — APPOINTMENT (OUTPATIENT)
Dept: CARDIOLOGY | Facility: HOSPITAL | Age: 75
End: 2025-07-06
Payer: MEDICARE

## 2025-07-06 PROBLEM — R91.8 LUNG MASS: Status: ACTIVE | Noted: 2025-07-06

## 2025-07-06 PROBLEM — R55 SYNCOPE: Status: ACTIVE | Noted: 2025-07-06

## 2025-07-06 LAB
ANION GAP SERPL CALCULATED.3IONS-SCNC: 9.4 MMOL/L (ref 5–15)
AORTIC DIMENSIONLESS INDEX: 0.79 (DI)
AV MEAN PRESS GRAD SYS DOP V1V2: 4 MMHG
AV VMAX SYS DOP: 134 CM/SEC
BACTERIA UR QL AUTO: ABNORMAL /HPF
BH CV ECHO LEFT VENTRICLE GLOBAL LONGITUDINAL STRAIN: -18.2 %
BH CV ECHO MEAS - ACS: 1.7 CM
BH CV ECHO MEAS - AO MAX PG: 7.2 MMHG
BH CV ECHO MEAS - AO V2 VTI: 24.7 CM
BH CV ECHO MEAS - AVA(I,D): 2.15 CM2
BH CV ECHO MEAS - EDV(CUBED): 59.3 ML
BH CV ECHO MEAS - EDV(MOD-SP4): 43.9 ML
BH CV ECHO MEAS - EF(MOD-SP4): 62.2 %
BH CV ECHO MEAS - ESV(CUBED): 15.6 ML
BH CV ECHO MEAS - ESV(MOD-SP4): 16.6 ML
BH CV ECHO MEAS - FS: 35.9 %
BH CV ECHO MEAS - IVS/LVPW: 1.08 CM
BH CV ECHO MEAS - IVSD: 1.3 CM
BH CV ECHO MEAS - LA DIMENSION: 3 CM
BH CV ECHO MEAS - LAT PEAK E' VEL: 9.5 CM/SEC
BH CV ECHO MEAS - LV DIASTOLIC VOL/BSA (35-75): 31.2 CM2
BH CV ECHO MEAS - LV MASS(C)D: 169.4 GRAMS
BH CV ECHO MEAS - LV MAX PG: 4.5 MMHG
BH CV ECHO MEAS - LV MEAN PG: 2 MMHG
BH CV ECHO MEAS - LV SYSTOLIC VOL/BSA (12-30): 11.8 CM2
BH CV ECHO MEAS - LV V1 MAX: 106 CM/SEC
BH CV ECHO MEAS - LV V1 VTI: 18.7 CM
BH CV ECHO MEAS - LVIDD: 3.9 CM
BH CV ECHO MEAS - LVIDS: 2.5 CM
BH CV ECHO MEAS - LVOT AREA: 2.8 CM2
BH CV ECHO MEAS - LVOT DIAM: 1.9 CM
BH CV ECHO MEAS - LVPWD: 1.2 CM
BH CV ECHO MEAS - MED PEAK E' VEL: 8.6 CM/SEC
BH CV ECHO MEAS - MR MAX PG: 106.5 MMHG
BH CV ECHO MEAS - MR MAX VEL: 516 CM/SEC
BH CV ECHO MEAS - MV A MAX VEL: 110 CM/SEC
BH CV ECHO MEAS - MV DEC SLOPE: 769 CM/SEC2
BH CV ECHO MEAS - MV DEC TIME: 0.2 SEC
BH CV ECHO MEAS - MV E MAX VEL: 68.1 CM/SEC
BH CV ECHO MEAS - MV E/A: 0.62
BH CV ECHO MEAS - MV MAX PG: 6.1 MMHG
BH CV ECHO MEAS - MV MEAN PG: 3 MMHG
BH CV ECHO MEAS - MV P1/2T: 36.6 MSEC
BH CV ECHO MEAS - MV V2 VTI: 21.5 CM
BH CV ECHO MEAS - MVA(P1/2T): 6 CM2
BH CV ECHO MEAS - MVA(VTI): 2.47 CM2
BH CV ECHO MEAS - PA ACC TIME: 0.14 SEC
BH CV ECHO MEAS - PA V2 MAX: 115 CM/SEC
BH CV ECHO MEAS - RAP SYSTOLE: 3 MMHG
BH CV ECHO MEAS - RV MAX PG: 2.9 MMHG
BH CV ECHO MEAS - RV V1 MAX: 85.4 CM/SEC
BH CV ECHO MEAS - RV V1 VTI: 15.5 CM
BH CV ECHO MEAS - RVSP: 28 MMHG
BH CV ECHO MEAS - SV(LVOT): 53 ML
BH CV ECHO MEAS - SV(MOD-SP4): 27.3 ML
BH CV ECHO MEAS - SVI(LVOT): 37.7 ML/M2
BH CV ECHO MEAS - SVI(MOD-SP4): 19.4 ML/M2
BH CV ECHO MEAS - TAPSE (>1.6): 1.97 CM
BH CV ECHO MEAS - TR MAX PG: 25 MMHG
BH CV ECHO MEAS - TR MAX VEL: 250 CM/SEC
BH CV ECHO MEASUREMENTS AVERAGE E/E' RATIO: 7.52
BH CV XLRA - TDI S': 17.3 CM/SEC
BILIRUB UR QL STRIP: NEGATIVE
BUN SERPL-MCNC: 19.9 MG/DL (ref 8–23)
BUN/CREAT SERPL: 18.6 (ref 7–25)
CALCIUM SPEC-SCNC: 8.7 MG/DL (ref 8.6–10.5)
CHLORIDE SERPL-SCNC: 101 MMOL/L (ref 98–107)
CLARITY UR: ABNORMAL
CO2 SERPL-SCNC: 22.6 MMOL/L (ref 22–29)
COLOR UR: ABNORMAL
CREAT SERPL-MCNC: 1.07 MG/DL (ref 0.57–1)
DEPRECATED RDW RBC AUTO: 45.6 FL (ref 37–54)
EGFRCR SERPLBLD CKD-EPI 2021: 53.6 ML/MIN/1.73
ERYTHROCYTE [DISTWIDTH] IN BLOOD BY AUTOMATED COUNT: 13.1 % (ref 12.3–15.4)
GEN 5 1HR TROPONIN T REFLEX: 14 NG/L
GLUCOSE SERPL-MCNC: 112 MG/DL (ref 65–99)
GLUCOSE UR STRIP-MCNC: NEGATIVE MG/DL
HCT VFR BLD AUTO: 39 % (ref 34–46.6)
HGB BLD-MCNC: 12.5 G/DL (ref 12–15.9)
HGB UR QL STRIP.AUTO: NEGATIVE
HYALINE CASTS UR QL AUTO: ABNORMAL /LPF
KETONES UR QL STRIP: ABNORMAL
LEFT ATRIUM VOLUME INDEX: 18.2 ML/M2
LEUKOCYTE ESTERASE UR QL STRIP.AUTO: ABNORMAL
LV EF BIPLANE MOD: 62 %
MCH RBC QN AUTO: 30.5 PG (ref 26.6–33)
MCHC RBC AUTO-ENTMCNC: 32.1 G/DL (ref 31.5–35.7)
MCV RBC AUTO: 95.1 FL (ref 79–97)
NITRITE UR QL STRIP: POSITIVE
PH UR STRIP.AUTO: 6 [PH] (ref 5–8)
PLATELET # BLD AUTO: 220 10*3/MM3 (ref 140–450)
PMV BLD AUTO: 8.7 FL (ref 6–12)
POTASSIUM SERPL-SCNC: 4.3 MMOL/L (ref 3.5–5.2)
PROT UR QL STRIP: ABNORMAL
QT INTERVAL: 416 MS
QTC INTERVAL: 454 MS
RBC # BLD AUTO: 4.1 10*6/MM3 (ref 3.77–5.28)
RBC # UR STRIP: ABNORMAL /HPF
REF LAB TEST METHOD: ABNORMAL
SINUS: 2.8 CM
SODIUM SERPL-SCNC: 133 MMOL/L (ref 136–145)
SP GR UR STRIP: 1.02 (ref 1–1.03)
SQUAMOUS #/AREA URNS HPF: ABNORMAL /HPF
STJ: 2.1 CM
TROPONIN T NUMERIC DELTA: 1 NG/L
UROBILINOGEN UR QL STRIP: ABNORMAL
WBC # UR STRIP: ABNORMAL /HPF
WBC NRBC COR # BLD AUTO: 7.96 10*3/MM3 (ref 3.4–10.8)

## 2025-07-06 PROCEDURE — 97162 PT EVAL MOD COMPLEX 30 MIN: CPT

## 2025-07-06 PROCEDURE — 25510000001 IOPAMIDOL PER 1 ML

## 2025-07-06 PROCEDURE — 93356 MYOCRD STRAIN IMG SPCKL TRCK: CPT

## 2025-07-06 PROCEDURE — G0378 HOSPITAL OBSERVATION PER HR: HCPCS

## 2025-07-06 PROCEDURE — 80048 BASIC METABOLIC PNL TOTAL CA: CPT | Performed by: NURSE PRACTITIONER

## 2025-07-06 PROCEDURE — 94799 UNLISTED PULMONARY SVC/PX: CPT

## 2025-07-06 PROCEDURE — 85027 COMPLETE CBC AUTOMATED: CPT | Performed by: NURSE PRACTITIONER

## 2025-07-06 PROCEDURE — 93356 MYOCRD STRAIN IMG SPCKL TRCK: CPT | Performed by: STUDENT IN AN ORGANIZED HEALTH CARE EDUCATION/TRAINING PROGRAM

## 2025-07-06 PROCEDURE — 70450 CT HEAD/BRAIN W/O DYE: CPT

## 2025-07-06 PROCEDURE — 87086 URINE CULTURE/COLONY COUNT: CPT

## 2025-07-06 PROCEDURE — 84484 ASSAY OF TROPONIN QUANT: CPT

## 2025-07-06 PROCEDURE — 25810000003 SODIUM CHLORIDE 0.9 % SOLUTION: Performed by: NURSE PRACTITIONER

## 2025-07-06 PROCEDURE — 87088 URINE BACTERIA CULTURE: CPT

## 2025-07-06 PROCEDURE — 71275 CT ANGIOGRAPHY CHEST: CPT

## 2025-07-06 PROCEDURE — 93306 TTE W/DOPPLER COMPLETE: CPT | Performed by: STUDENT IN AN ORGANIZED HEALTH CARE EDUCATION/TRAINING PROGRAM

## 2025-07-06 PROCEDURE — 93306 TTE W/DOPPLER COMPLETE: CPT

## 2025-07-06 PROCEDURE — 87186 SC STD MICRODIL/AGAR DIL: CPT

## 2025-07-06 PROCEDURE — 25010000002 CEFTRIAXONE PER 250 MG

## 2025-07-06 PROCEDURE — 81001 URINALYSIS AUTO W/SCOPE: CPT

## 2025-07-06 RX ORDER — BUDESONIDE AND FORMOTEROL FUMARATE DIHYDRATE 160; 4.5 UG/1; UG/1
2 AEROSOL RESPIRATORY (INHALATION)
Status: DISCONTINUED | OUTPATIENT
Start: 2025-07-06 | End: 2025-07-08 | Stop reason: HOSPADM

## 2025-07-06 RX ORDER — ACETAMINOPHEN 650 MG/1
650 SUPPOSITORY RECTAL EVERY 4 HOURS PRN
Status: DISCONTINUED | OUTPATIENT
Start: 2025-07-06 | End: 2025-07-08 | Stop reason: HOSPADM

## 2025-07-06 RX ORDER — SODIUM CHLORIDE 0.9 % (FLUSH) 0.9 %
10 SYRINGE (ML) INJECTION AS NEEDED
Status: DISCONTINUED | OUTPATIENT
Start: 2025-07-06 | End: 2025-07-08 | Stop reason: HOSPADM

## 2025-07-06 RX ORDER — METOLAZONE 2.5 MG/1
2.5 TABLET ORAL DAILY
COMMUNITY
End: 2025-07-08 | Stop reason: HOSPADM

## 2025-07-06 RX ORDER — ACETAMINOPHEN 160 MG/5ML
650 SOLUTION ORAL EVERY 4 HOURS PRN
Status: DISCONTINUED | OUTPATIENT
Start: 2025-07-06 | End: 2025-07-08 | Stop reason: HOSPADM

## 2025-07-06 RX ORDER — IOPAMIDOL 755 MG/ML
100 INJECTION, SOLUTION INTRAVASCULAR
Status: COMPLETED | OUTPATIENT
Start: 2025-07-06 | End: 2025-07-06

## 2025-07-06 RX ORDER — CILOSTAZOL 100 MG/1
100 TABLET ORAL 2 TIMES DAILY
COMMUNITY

## 2025-07-06 RX ORDER — SODIUM CHLORIDE 0.9 % (FLUSH) 0.9 %
10 SYRINGE (ML) INJECTION EVERY 12 HOURS SCHEDULED
Status: DISCONTINUED | OUTPATIENT
Start: 2025-07-06 | End: 2025-07-08 | Stop reason: HOSPADM

## 2025-07-06 RX ORDER — SODIUM CHLORIDE 9 MG/ML
40 INJECTION, SOLUTION INTRAVENOUS AS NEEDED
Status: DISCONTINUED | OUTPATIENT
Start: 2025-07-06 | End: 2025-07-08 | Stop reason: HOSPADM

## 2025-07-06 RX ORDER — GABAPENTIN 300 MG/1
300 CAPSULE ORAL NIGHTLY
Status: DISCONTINUED | OUTPATIENT
Start: 2025-07-06 | End: 2025-07-08 | Stop reason: HOSPADM

## 2025-07-06 RX ORDER — ACETAMINOPHEN 325 MG/1
650 TABLET ORAL EVERY 4 HOURS PRN
Status: DISCONTINUED | OUTPATIENT
Start: 2025-07-06 | End: 2025-07-08 | Stop reason: HOSPADM

## 2025-07-06 RX ORDER — POLYETHYLENE GLYCOL 3350 17 G/17G
17 POWDER, FOR SOLUTION ORAL DAILY PRN
Status: DISCONTINUED | OUTPATIENT
Start: 2025-07-06 | End: 2025-07-08 | Stop reason: HOSPADM

## 2025-07-06 RX ORDER — LISINOPRIL 20 MG/1
40 TABLET ORAL DAILY
COMMUNITY

## 2025-07-06 RX ORDER — BISACODYL 5 MG/1
5 TABLET, DELAYED RELEASE ORAL DAILY PRN
Status: DISCONTINUED | OUTPATIENT
Start: 2025-07-06 | End: 2025-07-08 | Stop reason: HOSPADM

## 2025-07-06 RX ORDER — ISOSORBIDE MONONITRATE 30 MG/1
30 TABLET, EXTENDED RELEASE ORAL DAILY
Status: DISCONTINUED | OUTPATIENT
Start: 2025-07-06 | End: 2025-07-08 | Stop reason: HOSPADM

## 2025-07-06 RX ORDER — SODIUM CHLORIDE 9 MG/ML
100 INJECTION, SOLUTION INTRAVENOUS CONTINUOUS
Status: DISPENSED | OUTPATIENT
Start: 2025-07-06 | End: 2025-07-07

## 2025-07-06 RX ORDER — NITROGLYCERIN 0.4 MG/1
0.4 TABLET SUBLINGUAL
Status: DISCONTINUED | OUTPATIENT
Start: 2025-07-06 | End: 2025-07-08 | Stop reason: HOSPADM

## 2025-07-06 RX ORDER — ALENDRONATE SODIUM 70 MG/1
70 TABLET ORAL
COMMUNITY

## 2025-07-06 RX ORDER — BISACODYL 10 MG
10 SUPPOSITORY, RECTAL RECTAL DAILY PRN
Status: DISCONTINUED | OUTPATIENT
Start: 2025-07-06 | End: 2025-07-08 | Stop reason: HOSPADM

## 2025-07-06 RX ORDER — ONDANSETRON 2 MG/ML
4 INJECTION INTRAMUSCULAR; INTRAVENOUS EVERY 6 HOURS PRN
Status: DISCONTINUED | OUTPATIENT
Start: 2025-07-06 | End: 2025-07-08 | Stop reason: HOSPADM

## 2025-07-06 RX ORDER — AMOXICILLIN 250 MG
2 CAPSULE ORAL 2 TIMES DAILY PRN
Status: DISCONTINUED | OUTPATIENT
Start: 2025-07-06 | End: 2025-07-08 | Stop reason: HOSPADM

## 2025-07-06 RX ORDER — ONDANSETRON 4 MG/1
4 TABLET, ORALLY DISINTEGRATING ORAL EVERY 6 HOURS PRN
Status: DISCONTINUED | OUTPATIENT
Start: 2025-07-06 | End: 2025-07-08 | Stop reason: HOSPADM

## 2025-07-06 RX ORDER — HYDRALAZINE HYDROCHLORIDE 20 MG/ML
10 INJECTION INTRAMUSCULAR; INTRAVENOUS EVERY 6 HOURS PRN
Status: DISCONTINUED | OUTPATIENT
Start: 2025-07-06 | End: 2025-07-08 | Stop reason: HOSPADM

## 2025-07-06 RX ADMIN — ISOSORBIDE MONONITRATE 30 MG: 30 TABLET, EXTENDED RELEASE ORAL at 09:25

## 2025-07-06 RX ADMIN — IOPAMIDOL 100 ML: 755 INJECTION, SOLUTION INTRAVENOUS at 01:12

## 2025-07-06 RX ADMIN — Medication 10 ML: at 20:13

## 2025-07-06 RX ADMIN — CEFTRIAXONE SODIUM 1000 MG: 1 INJECTION, POWDER, FOR SOLUTION INTRAMUSCULAR; INTRAVENOUS at 01:11

## 2025-07-06 RX ADMIN — SODIUM CHLORIDE 100 ML/HR: 9 INJECTION, SOLUTION INTRAVENOUS at 04:05

## 2025-07-06 RX ADMIN — Medication 10 ML: at 09:28

## 2025-07-06 RX ADMIN — GABAPENTIN 300 MG: 300 CAPSULE ORAL at 20:13

## 2025-07-06 RX ADMIN — BUDESONIDE AND FORMOTEROL FUMARATE DIHYDRATE 2 PUFF: 160; 4.5 AEROSOL RESPIRATORY (INHALATION) at 18:18

## 2025-07-06 NOTE — PLAN OF CARE
Goal Outcome Evaluation:  Plan of Care Reviewed With: patient, child           Outcome Evaluation: Pt is a 74 YO F admitted after fall at home, following syncopal episode. Pt wiht pain in LLE but imaging (-) for acute injury. Pt reports living at home with daughter where she is independent with ADLs, and ambulates without AD. Pt this date demonstrates fair mobility but was reliant on RWx to offload RLE. Pt appears safe to return home with family assist, and recommendation at this time is continued RWx use at d/c. Pt does not have a RWx and will need one at d/c. Pt does not appear to have other PT needs at this time, and PT to sign off at this time.    Anticipated Discharge Disposition (PT): home with 24/7 care

## 2025-07-06 NOTE — CONSULTS
[facility]   Consult Note    Patient Name: Lucrecia Ramesh  : 1950  MRN: [id number]  Primary Care Physician:  Eugene Swanson MD  Referring Physician: Eugene Swanson MD  Date of admission: 2025    Subjective   Subjective     Reason for Consult/ Chief Complaint: Syncopal episode    HPI:  Lucrecia Ramesh is a 75 y.o. female who had a syncopal episode yesterday and reported to the ER.  Workup with CT scan of the brain demonstrated punctate hyperdensity in the basal ganglia on the left.  Patient denies any headaches.  No changes in vision.  No weakness or numbness of the upper or lower extremities.    Review of Systems   All systems were reviewed and negative except for: Syncopal episode    Personal History     Past Medical History:   Diagnosis Date    Allergic     Angina pectoris     Arthritis     Asthma     Breast cyst     COPD (chronic obstructive pulmonary disease)     Coronary artery disease     Fibrocystic breast     GERD (gastroesophageal reflux disease)     Hyperlipidemia     Hypertension     Injury of back     Stroke     ROM slightly limited-nerve damage       Past Surgical History:   Procedure Laterality Date    BREAST CYST EXCISION      CARDIAC CATHETERIZATION      has had 2- sees Dr. Cadet    HYSTERECTOMY         Family History: family history includes Heart failure in her mother; Ovarian cancer in her daughter. Otherwise pertinent FHx was reviewed and not pertinent to current issue.    Social History:  reports that she has been smoking cigarettes. She has never used smokeless tobacco. She reports that she does not drink alcohol and does not use drugs.    Home Medications:  Fluticasone-Umeclidin-Vilant, alendronate, aspirin, cilostazol, gabapentin, isosorbide mononitrate, lisinopril, meloxicam, metOLazone, metoprolol succinate XL, and simvastatin    Allergies:  Allergies   Allergen Reactions    Neomycin-Bacitracin Zn-Polymyx Rash    Codeine GI Intolerance       Objective    Objective      Vitals:   Temp:  [97.5 °F (36.4 °C)-98.3 °F (36.8 °C)] 98.3 °F (36.8 °C)  Heart Rate:  [66-89] 89  Resp:  [9-20] 13  BP: (100-175)/(60-88) 175/88    Physical Exam:  Awake and alert  Extraocular movements intact, face equal, tongue midline   No drift  Moving all extremities  No respiratory distress  No lower extremity edema  Skin intact  Result Review    Result Review:  I have personally reviewed the results from the time of this admission to 7/6/2025 09:48 EDT and agree with these findings:  []  Laboratory list / accordion  []  Microbiology  [x]  Radiology  []  EKG/Telemetry   []  Cardiology/Vascular   []  Pathology  []  Old records  []  Other:  Most notable findings include: CT brain: There is a small punctate lesion in the left basal ganglia without mass effect or edema.  This may be a small area of hematoma versus calcification      Assessment & Plan   Assessment / Plan     Brief Patient Summary:  Lucrecia Ramesh is a 75 y.o. female who had a syncopal episode and was found to have a small hyperdensity within the left basal ganglia consistent with calcification versus intracerebral hemorrhage    Active Hospital Problems:  Active Hospital Problems    Diagnosis     **Syncope      Plan:   - Neuro intact  -No neurosurgical intervention is indicated  -Will follow-up repeat CT head to ensure stability of hyperdensity  -Care per primary team      Abel Johnson IV, MD

## 2025-07-06 NOTE — ED PROVIDER NOTES
Subjective   History of Present Illness  Is a 75-year-old female with PMH of COPD, CAD, HLD, HTN, CVA presenting to the ED via EMS after syncopal episode.  Patient states she got up off the couch was walking to her door to lock it when she started feeling lightheaded and passed out.  Patient states she went down on the tile floor landing on her left knee, reports pain in left knee with decreased range of motion.  She denies hitting her head during the fall.  She does report taking blood thinners.  She states her lightheadedness has resolved now.  She does report slight indigestion discomfort in her epigastric region, rates it a 1.5 out of 10.  She denies any fever, chills, nausea, vomiting, diarrhea, constipation, dyspnea, or chest pain.        Review of Systems   Constitutional:  Negative for chills and fever.   Respiratory:  Negative for shortness of breath.    Cardiovascular:  Negative for chest pain.   Gastrointestinal:  Positive for abdominal pain. Negative for constipation, diarrhea, nausea and vomiting.   Musculoskeletal:  Positive for arthralgias.   Neurological:  Positive for syncope.       Past Medical History:   Diagnosis Date    Allergic     Angina pectoris     Arthritis     Asthma     Breast cyst     COPD (chronic obstructive pulmonary disease)     Coronary artery disease     Fibrocystic breast     GERD (gastroesophageal reflux disease)     Hyperlipidemia     Hypertension     Injury of back     Stroke     ROM slightly limited-nerve damage       Allergies   Allergen Reactions    Neomycin-Bacitracin Zn-Polymyx Rash    Codeine GI Intolerance       Past Surgical History:   Procedure Laterality Date    BREAST CYST EXCISION      CARDIAC CATHETERIZATION      has had 2- sees Dr. Cadet    HYSTERECTOMY         Family History   Problem Relation Age of Onset    Ovarian cancer Daughter     Heart failure Mother        Social History     Socioeconomic History    Marital status:    Tobacco Use    Smoking  status: Every Day     Current packs/day: 1.50     Types: Cigarettes    Smokeless tobacco: Never   Vaping Use    Vaping status: Never Used   Substance and Sexual Activity    Alcohol use: No    Drug use: Never    Sexual activity: Defer           Objective   Physical Exam  Constitutional:       Appearance: Normal appearance.   HENT:      Head: Normocephalic and atraumatic.      Mouth/Throat:      Mouth: Mucous membranes are moist.   Eyes:      Extraocular Movements: Extraocular movements intact.      Pupils: Pupils are equal, round, and reactive to light.   Cardiovascular:      Rate and Rhythm: Normal rate and regular rhythm.      Pulses: Normal pulses.      Heart sounds: Normal heart sounds.   Pulmonary:      Effort: Pulmonary effort is normal.      Breath sounds: Normal breath sounds.   Abdominal:      General: Abdomen is flat. Bowel sounds are normal.      Palpations: Abdomen is soft.      Tenderness: There is no abdominal tenderness.   Musculoskeletal:      Cervical back: Normal range of motion and neck supple.        Legs:       Comments: Tenderness to palpation on left knee with decreased range of motion due to pain.  No visualized erythema edema or ecchymosis.  Capillary refill normal.  Pulses palpated bilaterally.   Skin:     General: Skin is warm and dry.      Capillary Refill: Capillary refill takes less than 2 seconds.   Neurological:      General: No focal deficit present.      Mental Status: She is alert and oriented to person, place, and time.   Psychiatric:         Mood and Affect: Mood normal.         Behavior: Behavior normal.         Procedures           ED Course  ED Course as of 07/06/25 0229   Sat Jul 05, 2025   6315 Waiting for CTs [EC]   Sun Jul 06, 2025   0145 Spoke with Dr. Johnson neurosurgeon who recommended admission for repeat CT tomorrow. [EC]   0154 Spoke with Gabby SCOTT with [facility] who agreed to admit patient. [EC]      ED Course User Index  [EC] Mimi Samuel PA-C      /61    "Pulse 66   Temp 98.2 °F (36.8 °C) (Oral)   Resp 20   Ht 152.4 cm (60\")   Wt 46.9 kg (103 lb 6.3 oz)   SpO2 96%   BMI 20.19 kg/m²   Labs Reviewed   COMPREHENSIVE METABOLIC PANEL - Abnormal; Notable for the following components:       Result Value    Glucose 158 (*)     Creatinine 1.32 (*)     Sodium 132 (*)     Chloride 96 (*)     Total Protein 5.9 (*)     eGFR 41.7 (*)     All other components within normal limits    Narrative:     GFR Categories in Chronic Kidney Disease (CKD)              GFR Category          GFR (mL/min/1.73)    Interpretation  G1                    90 or greater        Normal or high (1)  G2                    60-89                Mild decrease (1)  G3a                   45-59                Mild to moderate decrease  G3b                   30-44                Moderate to severe decrease  G4                    15-29                Severe decrease  G5                    14 or less           Kidney failure    (1)In the absence of evidence of kidney disease, neither GFR category G1 or G2 fulfill the criteria for CKD.    eGFR calculation 2021 CKD-EPI creatinine equation, which does not include race as a factor   URINALYSIS W/ CULTURE IF INDICATED - Abnormal; Notable for the following components:    Color, UA Dark Yellow (*)     Appearance, UA Cloudy (*)     Ketones, UA 15 mg/dL (1+) (*)     Protein, UA 30 mg/dL (1+) (*)     Leuk Esterase, UA Small (1+) (*)     Nitrite, UA Positive (*)     All other components within normal limits    Narrative:     In absence of clinical symptoms, the presence of pyuria, bacteria, and/or nitrites on the urinalysis result does not correlate with infection.   D-DIMER, QUANTITATIVE - Abnormal; Notable for the following components:    D-Dimer, Quantitative 1.63 (*)     All other components within normal limits    Narrative:     According to the assay 's published package insert, a normal (<0.50 MCGFEU/mL) D-dimer result in conjunction with a non-high " "clinical probability assessment, excludes deep vein thrombosis (DVT) and pulmonary embolism (PE) with high sensitivity.    D-dimer values increase with age and this can make VTE exclusion of an older population difficult. To address this, the [employer], based on best available evidence and recent guidelines, recommends that clinicians use age-adjusted D-dimer thresholds in patients greater than 50 years of age with: a) a low probability of PE who do not meet all Pulmonary Embolism Rule Out Criteria, or b) in those with intermediate probability of PE.   The formula for an age-adjusted D-dimer cut-off is \"age/100\".  For example, a 60 year old patient would have an age-adjusted cut-off of 0.60 MCGFEU/mL and an 80 year old 0.80 MCGFEU/mL.   HIGH SENSITIVITIY TROPONIN T 1HR - Abnormal; Notable for the following components:    HS Troponin T 14 (*)     All other components within normal limits    Narrative:     High Sensitive Troponin T Reference Range:  <14.0 ng/L- Negative Female for AMI  <22.0 ng/L- Negative Male for AMI  >=14 - Abnormal Female indicating possible myocardial injury.  >=22 - Abnormal Male indicating possible myocardial injury.   Clinicians would have to utilize clinical acumen, EKG, Troponin, and serial changes to determine if it is an Acute Myocardial Infarction or myocardial injury due to an underlying chronic condition.        URINALYSIS, MICROSCOPIC ONLY - Abnormal; Notable for the following components:    WBC, UA 21-50 (*)     Bacteria, UA 4+ (*)     Squamous Epithelial Cells, UA 3-6 (*)     All other components within normal limits   TROPONIN - Normal    Narrative:     High Sensitive Troponin T Reference Range:  <14.0 ng/L- Negative Female for AMI  <22.0 ng/L- Negative Male for AMI  >=14 - Abnormal Female indicating possible myocardial injury.  >=22 - Abnormal Male indicating possible myocardial injury.   Clinicians would have to utilize clinical acumen, EKG, Troponin, and " serial changes to determine if it is an Acute Myocardial Infarction or myocardial injury due to an underlying chronic condition.        CBC WITH AUTO DIFFERENTIAL - Normal   URINE CULTURE   CBC AND DIFFERENTIAL    Narrative:     The following orders were created for panel order CBC & Differential.  Procedure                               Abnormality         Status                     ---------                               -----------         ------                     CBC Auto Differential[[id number]]        Normal              Final result                 Please view results for these tests on the individual orders.     Medications   sodium chloride 0.9 % bolus 1,000 mL (1,000 mL Intravenous New Bag 7/5/25 2066)   cefTRIAXone (ROCEPHIN) 1,000 mg in sodium chloride 0.9 % 100 mL MBP (1,000 mg Intravenous New Bag 7/6/25 0111)   iopamidol (ISOVUE-370) 76 % injection 100 mL (100 mL Intravenous Given 7/6/25 0112)     CT Angiogram Chest Pulmonary Embolism  Result Date: 7/6/2025  Impression: 1.No evidence of pulmonary embolism. No acute cardiopulmonary process. 2.Spiculated mass present within the superior segment of the right lower lobe which appears to have increased in size as compared to the previous study. Findings are concerning for malignancy despite previous negative biopsy. No suspicious adenopathy identified. 3.Ancillary findings as described above. Electronically Signed: Opal Busby MD  7/6/2025 1:19 AM EDT  Workstation ID: [id number]    CT Head Without Contrast  Result Date: 7/6/2025  Impression: Tiny punctate hyperdensity seen within the left basal ganglia which appears new as compared to the previous study may represent a tiny focus of hemorrhage. This may also represent a focal calcification. No additional hyperdensity identified. No evidence of mass effect or midline shift. Short-term follow-up is recommended. Stable tiny lacunar infarct present within the adjacent left thalamus. Electronically Signed: Opal  MD Kehinde  7/6/2025 1:14 AM EDT  Workstation ID: [id number]    XR Chest 1 View  Result Date: 7/5/2025  Impression: 1.No acute cardiopulmonary process. 2.No acute osseous abnormality of the left knee. Mild to moderate osteoarthritic changes are present. Electronically Signed: Opal Busby MD  7/5/2025 11:06 PM EDT  Workstation ID: [id number]    XR Knee 3 View Left  Result Date: 7/5/2025  Impression: 1.No acute cardiopulmonary process. 2.No acute osseous abnormality of the left knee. Mild to moderate osteoarthritic changes are present. Electronically Signed: Opal Busby MD  7/5/2025 11:06 PM EDT  Workstation ID: [id number]                                                     Medical Decision Making  Chart review: 2/22/22 Dr. Kahn Stress Test:   · Diaphragmatic attenuation and GI artifacts are present.  · Left ventricular ejection fraction is hyperdynamic (Calculated EF > 70%). (Calculated EF = 70%).  · Myocardial perfusion imaging indicates a normal myocardial perfusion study with no evidence of ischemia.  · Impressions are consistent with a low risk study.  · There is no prior study available for comparison. Fixed inferoseptal defect c/w more GI/diaphragmatic attenuation, better with stress. EF 70%.  · Findings consistent with an equivocal ECG stress test.    Patient presented to the ED for the above complaint.    Patient underwent the above exam and evaluation.    While in the ED the patient was placed in a gown and an IV was established.  EKG, chest x-ray, blood work was obtained to assess for arrhythmia, MI, electrolyte abnormality, dehydration, infection, blood clot.  X-ray of left knee was obtained to assess for fracture or dislocation.  CT of head was obtained to assess for ICH, ischemia.  Patient had elevated D-dimer, CTA chest was obtained to assess for PE.  Patient was given IV fluids, noted to have a UTI, was given IV antibiotics.  CT of head showed concern, spoke with  neurosurgeon who recommended  admission for repeat CT in the morning.  Upon reevaluation patient resting comfortably, vital stable on room air.  Discussed findings with patient daughter at bedside.  Educated patient we will be admitting her to the hospital for further treatment and evaluation.  Patient voiced understanding, agreeable dispo plan.  Spoke with Gabby SCOTT with [facility] who agreed to admit patient.    EKG independently interpreted by Dr. Wilder showing sinus rhythm, borderline prolonged LA interval, rate 72, LA interval 214, QTc 454, compared to previous EKG 5/17/2020 showing sinus rhythm, prolonged LA interval, rate 70.  Labs were independently interpreted by myself and deemed remarkable for the following: No leukocytosis, hemoglobin stable at 12.7, hyponatremia 132, elevated creatinine at 1.32, initial troponin 13, repeat troponin 14, elevated D-dimer of 1.63, urinalysis positive for bacteria, pyuria, no hematuria, urine culture pending on admission.  Chest x-ray, CTA of chest, CT of head, x-ray of left knee independently interpreted by Dr. Wilder and reviewed by myself showing: Chest x-ray showed no cardiomegaly, pleural effusions, consolidations, or pneumothorax.  X-ray of left knee showed no fracture or dislocation.  CT of head showed tiny punctate hyperdensity within left basal ganglia, no mass effect or midline shift.  CTA of chest showed no evidence of PE, spiculated mass present in right lower lobe but that has increased in size compared to previous study, no adenopathy identified.    Appropriate PPE was worn during each patient encounter.    Note Disclaimer: At [facility], we believe that sharing information builds trust and better relationships. You are receiving this note because you are receiving care at [facility] or recently visited. It is possible you will see health information before a provider has talked with you about it. This kind of information can be easy to misunderstand. To help you fully understand what  it means for your health, we urge you to discuss this note with your provider.    Based on clinical findings anticipate this patient will require a 2 midnight stay.    Discussed this patient with Dr. Wilder who agrees with plan.      Problems Addressed:  Acute cystitis without hematuria: complicated acute illness or injury  Acute pain of left knee: complicated acute illness or injury  Intracranial hemorrhage: complicated acute illness or injury  Syncope and collapse: complicated acute illness or injury    Amount and/or Complexity of Data Reviewed  Labs: ordered.  Radiology: ordered.    Risk  Prescription drug management.  Decision regarding hospitalization.        Final diagnoses:   Syncope and collapse   Intracranial hemorrhage   Acute pain of left knee   Acute cystitis without hematuria       ED Disposition  ED Disposition       ED Disposition   Decision to Admit    Condition   --    Comment   Level of Care: Telemetry [5]   Admitting Physician: ORLANDO ROJO [[id number]]   Attending Physician: ORLANDO ROJO [[id number]]   Bed Request Comments: KIM                 No follow-up provider specified.       Medication List      No changes were made to your prescriptions during this visit.            Mimi Samuel PA-C  07/06/25 0229

## 2025-07-06 NOTE — H&P
Patient Care Team:  Eugene Swanson MD as PCP - General    Chief complaint syncope    Subjective     Patient is a 75 y.o. female with history of COPD, chronic coronary artery disease, hypertension and hyperlipidemia who presents with syncopal episode at home.  She was in her usual state of health yesterday.  As she was preparing to go to bed, she reached up to secure a door latch when she began to feel lightheaded.  She had morning but then did not lose consciousness briefly.  She injured her left knee, falling on the tile floor.  She regained consciousness promptly.  Family was in the next room and was at her side promptly.  There was no witnessed seizure activity.  She was quickly back to full consciousness.  There was no incontinence.  She states she recently added a new blood pressure medicine as directed by PCP because of persistently elevated readings.  She feels fine this morning other than left knee pain.    Imaging in the ER suggested a very small hyperdense area in the basal ganglia.    Review of PCP records show that BP meds had recently increased dose of amlodipine from 5 mg to 10 mg, increased lisinopril from 10 mg to 20 mg and added metolazone 2.5 mg daily.    Review of Systems   Constitutional:  Positive for activity change. Negative for appetite change, chills, diaphoresis, fatigue, fever and unexpected weight change.   HENT:  Negative for congestion and facial swelling.    Respiratory:  Negative for cough, shortness of breath, wheezing and stridor.    Cardiovascular:  Negative for chest pain, palpitations and leg swelling.   Genitourinary:  Negative for dysuria.   Musculoskeletal:  Positive for arthralgias.   Skin:  Negative for rash and wound.   Neurological:  Negative for weakness.   Psychiatric/Behavioral:  Negative for confusion.           History  Past Medical History:   Diagnosis Date    Allergic     Angina pectoris     Arthritis     Asthma     Breast cyst     COPD (chronic obstructive  pulmonary disease)     Coronary artery disease     Fibrocystic breast     GERD (gastroesophageal reflux disease)     Hyperlipidemia     Hypertension     Injury of back     Stroke     ROM slightly limited-nerve damage     Past Surgical History:   Procedure Laterality Date    BREAST CYST EXCISION      CARDIAC CATHETERIZATION      has had 2- sees Dr. Cadet    HYSTERECTOMY       Family History   Problem Relation Age of Onset    Ovarian cancer Daughter     Heart failure Mother      Social History     Tobacco Use    Smoking status: Every Day     Current packs/day: 1.50     Types: Cigarettes    Smokeless tobacco: Never   Vaping Use    Vaping status: Never Used   Substance Use Topics    Alcohol use: No    Drug use: Never     Medications Prior to Admission   Medication Sig Dispense Refill Last Dose/Taking    alendronate (FOSAMAX) 70 MG tablet Take 1 tablet by mouth Every 7 (Seven) Days. Tues   Taking    aspirin 81 MG chewable tablet Chew 1 tablet Daily.   Taking    cilostazol (PLETAL) 100 MG tablet Take 1 tablet by mouth 2 (Two) Times a Day.   Taking    Fluticasone-Umeclidin-Vilant (TRELEGY) 100-62.5-25 MCG/INH inhaler Inhale 1 puff Daily.   Taking    gabapentin (NEURONTIN) 300 MG capsule TAKE 1 CAPSULE BY MOUTH EVERY DAY AT BEDTIME 90 capsule 2 Taking    isosorbide mononitrate (IMDUR) 30 MG 24 hr tablet Take 1 tablet by mouth Daily. 90 tablet 1 Taking    lisinopril (PRINIVIL,ZESTRIL) 20 MG tablet Take 2 tablets by mouth Daily.   Taking    meloxicam (MOBIC) 15 MG tablet Take 1 tablet by mouth Daily.   Taking    metOLazone (ZAROXOLYN) 2.5 MG tablet Take 1 tablet by mouth Daily.   Taking    metoprolol succinate XL (TOPROL-XL) 25 MG 24 hr tablet Take 1 tablet by mouth Daily.   Taking    simvastatin (ZOCOR) 10 MG tablet Take 1 tablet by mouth Every Night.   Taking     Allergies:  Neomycin-bacitracin zn-polymyx and Codeine    Objective     Vital Signs  Temp:  [97.4 °F (36.3 °C)-98.3 °F (36.8 °C)] 97.4 °F (36.3 °C)  Heart Rate:   [66-89] 88  Resp:  [9-20] 14  BP: (100-175)/(57-88) 134/72     Physical Exam:      General Appearance:    Alert, cooperative, in no acute distress   Head:    Normocephalic, without obvious abnormality, atraumatic   Eyes:            Lids and lashes normal, conjunctivae and sclerae normal, no   icterus, no pallor, corneas clear, PERRLA   Ears:    Ears appear intact with no abnormalities noted   Throat:   No oral lesions, no thrush, oral mucosa moist   Neck:   No adenopathy, supple, trachea midline, no thyromegaly, no   carotid bruit, no JVD   Lungs:     Clear to auscultation,respirations regular, even and                  unlabored    Heart:    Regular rhythm and normal rate, normal S1 and S2, no            murmur, no gallop, no rub, no click   Chest Wall:    No abnormalities observed   Abdomen:     Normal bowel sounds, no masses, no organomegaly, soft        non-tender, non-distended, no guarding, no rebound                tenderness   Extremities:  Left knee - small patellar effusion, tender, good range of motion   Pulses:   Pulses palpable and equal bilaterally   Skin:   No bleeding, bruising or rash   Lymph nodes:   No palpable adenopathy   Neurologic:   Cranial nerves 2 - 12 grossly intact, sensation intact, DTR       present and equal bilaterally       Results Review:     Imaging Results (Last 24 Hours)       Procedure Component Value Units Date/Time    CT Head Without Contrast [[id number]] Collected: 07/06/25 1352     Updated: 07/06/25 1403    Narrative:      CT HEAD WO CONTRAST    Date of Exam: 7/6/2025 11:43 AM EDT    Indication: eval ich.    Comparison: Same date at 0054    Technique: Axial CT images were obtained of the head without contrast administration.  Coronal reconstructions were performed.  Automated exposure control and iterative reconstruction methods were used.      Findings:  Ventricles are midline and appear appropriate in size    There is extensive low-attenuation in the periventricular and  subcortical white matter bilaterally    There are multiple remote lacunar infarcts noted particularly in the left thalamus and left corona radiata.    Adjacent to the left thalamic lacunar infarct is seen on the previous CT there is a tiny focus of hyperdensity. This appears to be decreasing in size although I suspect this is likely due to slice selection differences.    This is difficult to measure precisely because of its size measuring less than a millimeter in size.    There is note made of dense parasellar carotid calcifications    There is intraluminal basilar artery calcification unchanged.    There is no evidence of dense MCA sign.    Tiny remote lacunar infarct is evidence right basal ganglion.    Calvarium is unremarkable.          Impression:      Impression:  Punctate hyperdensity in the left thalamus associated with remote lacunar infarct is redemonstrated although appearing decreasing in size perhaps due to slice selection differences    It measured less than 1 mm on this examination. I favor a benign calcification. Tiny focus of intraparenchymal hemorrhage however continues to be in the differential diagnosis. Recommend appropriate clinical and imaging follow-up          Electronically Signed: Fredy Ojeda MD    7/6/2025 2:01 PM EDT    Workstation ID: [id number]    CT Angiogram Chest Pulmonary Embolism [[id number]] Collected: 07/06/25 0115     Updated: 07/06/25 0121    Narrative:      CT ANGIOGRAM CHEST PULMONARY EMBOLISM    Date of Exam: 7/6/2025 12:44 AM EDT    Indication: Syncope, elevated dimer.    Comparison: 7/5/2025, 8/1/2024.    Technique: Axial CT images were obtained of the chest after the uneventful intravenous administration of iodinated contrast utilizing pulmonary embolism protocol.  In addition, a 3-D volume rendered image was created for interpretation.  Sagittal and   coronal reconstructions were performed.  Automated exposure control and iterative reconstruction methods were  used.      Findings:    Pulmonary arteries: Adequate opacification of the pulmonary arteries. No evidence of acute pulmonary embolism.    Lungs and Pleura: No focal consolidation. No pleural effusion. There is a mass present within the superior segment of the right lower lobe which appears spiculated measuring up to 2.0 x 1.8 cm (series 5 image 84) which appears to have increased in size   as compared to the previous study measuring up to 1.1 x 1.3 cm on CT from 7/11/2024. Adjacent scarring is present likely related to previous biopsy. No additional mass identified. Emphysematous changes are present predominately within the bilateral upper   lobes. No suspicious adenopathy.    Mediastinum/Sharmin: No mediastinal or hilar lymphadenopathy.    Lymph nodes: No axillary or supraclavicular adenopathy.    Cardiovascular: The cardiac chambers are within normal limits. The pericardium is normal. The aorta and its arch branch vessels are unremarkable.       Upper Abdomen: The upper abdominal contents are unremarkable.          Bones and Soft Tissue: No suspicious osseous lesion.        Impression:      Impression:  1.No evidence of pulmonary embolism. No acute cardiopulmonary process.  2.Spiculated mass present within the superior segment of the right lower lobe which appears to have increased in size as compared to the previous study. Findings are concerning for malignancy despite previous negative biopsy. No suspicious adenopathy   identified.  3.Ancillary findings as described above.            Electronically Signed: Opal Busby MD    7/6/2025 1:19 AM EDT    Workstation ID: [id number]    CT Head Without Contrast [[id number]] Collected: 07/06/25 0112     Updated: 07/06/25 0116    Narrative:      CT HEAD WO CONTRAST    Date of Exam: 7/6/2025 12:43 AM EDT    Indication: Syncope, fall.    Comparison: 9/21/2021.    Technique: Axial CT images were obtained of the head without contrast administration.  Coronal reconstructions were  performed.  Automated exposure control and iterative reconstruction methods were used.      Findings:  Remote lacunar infarct present within the left thalamus, stable. There is a very tiny punctate hyperdensity seen just anteriorly within the left basal ganglia measuring approximately 2 to 3 mm (series 2 image 20). No additional hyperdensity identified.   No evidence of mass effect or midline shift.    There is no extracerebral collection.    Ventricles are normal in size and configuration for patient's stated age.      Posterior fossa is within normal limits.    Calvarium and skull base appear intact.   Visualized sinuses show no air fluid levels. Visualized orbits are unremarkable.      Impression:      Impression:  Tiny punctate hyperdensity seen within the left basal ganglia which appears new as compared to the previous study may represent a tiny focus of hemorrhage. This may also represent a focal calcification. No additional hyperdensity identified. No evidence   of mass effect or midline shift. Short-term follow-up is recommended. Stable tiny lacunar infarct present within the adjacent left thalamus.          Electronically Signed: Opal Busby MD    7/6/2025 1:14 AM EDT    Workstation ID: [id number]    XR Chest 1 View [[id number]] Collected: 07/05/25 2305     Updated: 07/05/25 2308    Narrative:      XR CHEST 1 VW, XR KNEE 3 VW LEFT    Date of Exam: 7/5/2025 10:59 PM EDT    Indication: Syncope fall    Comparison: 8/29/2024.    Findings:  Chest: There are no airspace consolidations. No pleural fluid. No pneumothorax. The pulmonary vasculature appears within normal limits. The cardiac and mediastinal silhouette appear unremarkable. No acute osseous abnormality identified.    Left knee: No evidence of fracture. No evidence of dislocation. No joint effusion identified. No focal soft tissue abnormality is identified.  Mild to moderate  osteoarthritic changes are present, most pronounced within the medial  compartment.         Impression:      Impression:  1.No acute cardiopulmonary process.  2.No acute osseous abnormality of the left knee. Mild to moderate osteoarthritic changes are present.          Electronically Signed: Oapl Busby MD    7/5/2025 11:06 PM EDT    Workstation ID: [id number]    XR Knee 3 View Left [[id number]] Collected: 07/05/25 2305     Updated: 07/05/25 2308    Narrative:      XR CHEST 1 VW, XR KNEE 3 VW LEFT    Date of Exam: 7/5/2025 10:59 PM EDT    Indication: Syncope fall    Comparison: 8/29/2024.    Findings:  Chest: There are no airspace consolidations. No pleural fluid. No pneumothorax. The pulmonary vasculature appears within normal limits. The cardiac and mediastinal silhouette appear unremarkable. No acute osseous abnormality identified.    Left knee: No evidence of fracture. No evidence of dislocation. No joint effusion identified. No focal soft tissue abnormality is identified.  Mild to moderate  osteoarthritic changes are present, most pronounced within the medial compartment.         Impression:      Impression:  1.No acute cardiopulmonary process.  2.No acute osseous abnormality of the left knee. Mild to moderate osteoarthritic changes are present.          Electronically Signed: Opal Busby MD    7/5/2025 11:06 PM EDT    Workstation ID: [id number]             Lab Results (last 24 hours)       Procedure Component Value Units Date/Time    Basic Metabolic Panel [[id number]]  (Abnormal) Collected: 07/06/25 0418    Specimen: Blood from Arm, Right Updated: 07/06/25 0501     Glucose 112 mg/dL      BUN 19.9 mg/dL      Creatinine 1.07 mg/dL      Sodium 133 mmol/L      Potassium 4.3 mmol/L      Comment: Specimen hemolyzed.  Result may be falsely elevated.        Chloride 101 mmol/L      CO2 22.6 mmol/L      Calcium 8.7 mg/dL      BUN/Creatinine Ratio 18.6     Anion Gap 9.4 mmol/L      eGFR 53.6 mL/min/1.73     Narrative:      GFR Categories in Chronic Kidney Disease (CKD)              GFR  Category          GFR (mL/min/1.73)    Interpretation  G1                    90 or greater        Normal or high (1)  G2                    60-89                Mild decrease (1)  G3a                   45-59                Mild to moderate decrease  G3b                   30-44                Moderate to severe decrease  G4                    15-29                Severe decrease  G5                    14 or less           Kidney failure    (1)In the absence of evidence of kidney disease, neither GFR category G1 or G2 fulfill the criteria for CKD.    eGFR calculation 2021 CKD-EPI creatinine equation, which does not include race as a factor    CBC (No Diff) [[id number]]  (Normal) Collected: 07/06/25 0418    Specimen: Blood from Arm, Right Updated: 07/06/25 0436     WBC 7.96 10*3/mm3      RBC 4.10 10*6/mm3      Hemoglobin 12.5 g/dL      Hematocrit 39.0 %      MCV 95.1 fL      MCH 30.5 pg      MCHC 32.1 g/dL      RDW 13.1 %      RDW-SD 45.6 fl      MPV 8.7 fL      Platelets 220 10*3/mm3     High Sensitivity Troponin T 1Hr [[id number]]  (Abnormal) Collected: 07/06/25 0146    Specimen: Blood from Arm, Left Updated: 07/06/25 0213     HS Troponin T 14 ng/L      Troponin T Numeric Delta 1 ng/L     Narrative:      High Sensitive Troponin T Reference Range:  <14.0 ng/L- Negative Female for AMI  <22.0 ng/L- Negative Male for AMI  >=14 - Abnormal Female indicating possible myocardial injury.  >=22 - Abnormal Male indicating possible myocardial injury.   Clinicians would have to utilize clinical acumen, EKG, Troponin, and serial changes to determine if it is an Acute Myocardial Infarction or myocardial injury due to an underlying chronic condition.         Urinalysis, Microscopic Only - Urine, Clean Catch [[id number]]  (Abnormal) Collected: 07/06/25 0007    Specimen: Urine, Clean Catch Updated: 07/06/25 0045     RBC, UA 0-2 /HPF      WBC, UA 21-50 /HPF      Bacteria, UA 4+ /HPF      Squamous Epithelial Cells, UA 3-6 /HPF      Hyaline  Casts, UA 7-12 /LPF      Methodology Manual Light Microscopy    Urine Culture - Urine, Urine, Clean Catch [[id number]] Collected: 07/06/25 0007    Specimen: Urine, Clean Catch Updated: 07/06/25 0045    Urinalysis With Culture If Indicated - Urine, Clean Catch [[id number]]  (Abnormal) Collected: 07/06/25 0007    Specimen: Urine, Clean Catch Updated: 07/06/25 0028     Color, UA Dark Yellow     Appearance, UA Cloudy     pH, UA 6.0     Specific Gravity, UA 1.021     Glucose, UA Negative     Ketones, UA 15 mg/dL (1+)     Bilirubin, UA Negative     Blood, UA Negative     Protein, UA 30 mg/dL (1+)     Leuk Esterase, UA Small (1+)     Nitrite, UA Positive     Urobilinogen, UA 1.0 E.U./dL    Narrative:      In absence of clinical symptoms, the presence of pyuria, bacteria, and/or nitrites on the urinalysis result does not correlate with infection.    Comprehensive Metabolic Panel [[id number]]  (Abnormal) Collected: 07/05/25 2236    Specimen: Blood from Arm, Left Updated: 07/05/25 2307     Glucose 158 mg/dL      BUN 22.1 mg/dL      Creatinine 1.32 mg/dL      Sodium 132 mmol/L      Potassium 4.3 mmol/L      Comment: Specimen hemolyzed.  Result may be falsely elevated.        Chloride 96 mmol/L      CO2 25.3 mmol/L      Calcium 9.3 mg/dL      Total Protein 5.9 g/dL      Albumin 3.8 g/dL      ALT (SGPT) 9 U/L      AST (SGOT) 19 U/L      Comment: Specimen hemolyzed.  Result may be falsely elevated.        Alkaline Phosphatase 66 U/L      Total Bilirubin <0.2 mg/dL      Globulin 2.1 gm/dL      A/G Ratio 1.8 g/dL      BUN/Creatinine Ratio 16.7     Anion Gap 10.7 mmol/L      eGFR 41.7 mL/min/1.73     Narrative:      GFR Categories in Chronic Kidney Disease (CKD)              GFR Category          GFR (mL/min/1.73)    Interpretation  G1                    90 or greater        Normal or high (1)  G2                    60-89                Mild decrease (1)  G3a                   45-59                Mild to moderate decrease  G3b                    30-44                Moderate to severe decrease  G4                    15-29                Severe decrease  G5                    14 or less           Kidney failure    (1)In the absence of evidence of kidney disease, neither GFR category G1 or G2 fulfill the criteria for CKD.    eGFR calculation 2021 CKD-EPI creatinine equation, which does not include race as a factor    High Sensitivity Troponin T [[id number]]  (Normal) Collected: 07/05/25 2236    Specimen: Blood from Arm, Left Updated: 07/05/25 2304     HS Troponin T 13 ng/L     Narrative:      High Sensitive Troponin T Reference Range:  <14.0 ng/L- Negative Female for AMI  <22.0 ng/L- Negative Male for AMI  >=14 - Abnormal Female indicating possible myocardial injury.  >=22 - Abnormal Male indicating possible myocardial injury.   Clinicians would have to utilize clinical acumen, EKG, Troponin, and serial changes to determine if it is an Acute Myocardial Infarction or myocardial injury due to an underlying chronic condition.         D-dimer, Quantitative [[id number]]  (Abnormal) Collected: 07/05/25 2236    Specimen: Blood from Arm, Left Updated: 07/05/25 2257     D-Dimer, Quantitative 1.63 MCGFEU/mL     Narrative:      According to the assay 's published package insert, a normal (<0.50 MCGFEU/mL) D-dimer result in conjunction with a non-high clinical probability assessment, excludes deep vein thrombosis (DVT) and pulmonary embolism (PE) with high sensitivity.    D-dimer values increase with age and this can make VTE exclusion of an older population difficult. To address this, the [employer], based on best available evidence and recent guidelines, recommends that clinicians use age-adjusted D-dimer thresholds in patients greater than 50 years of age with: a) a low probability of PE who do not meet all Pulmonary Embolism Rule Out Criteria, or b) in those with intermediate probability of PE.   The formula for an age-adjusted  "D-dimer cut-off is \"age/100\".  For example, a 60 year old patient would have an age-adjusted cut-off of 0.60 MCGFEU/mL and an 80 year old 0.80 MCGFEU/mL.    CBC & Differential [[id number]]  (Normal) Collected: 07/05/25 2236    Specimen: Blood from Arm, Left Updated: 07/05/25 2243    Narrative:      The following orders were created for panel order CBC & Differential.  Procedure                               Abnormality         Status                     ---------                               -----------         ------                     CBC Auto Differential[[id number]]        Normal              Final result                 Please view results for these tests on the individual orders.    CBC Auto Differential [[id number]]  (Normal) Collected: 07/05/25 2236    Specimen: Blood from Arm, Left Updated: 07/05/25 2243     WBC 6.83 10*3/mm3      RBC 4.24 10*6/mm3      Hemoglobin 12.7 g/dL      Hematocrit 39.2 %      MCV 92.5 fL      MCH 30.0 pg      MCHC 32.4 g/dL      RDW 13.2 %      RDW-SD 45.1 fl      MPV 8.4 fL      Platelets 240 10*3/mm3      Neutrophil % 62.9 %      Lymphocyte % 21.5 %      Monocyte % 10.1 %      Eosinophil % 4.8 %      Basophil % 0.6 %      Immature Grans % 0.1 %      Neutrophils, Absolute 4.29 10*3/mm3      Lymphocytes, Absolute 1.47 10*3/mm3      Monocytes, Absolute 0.69 10*3/mm3      Eosinophils, Absolute 0.33 10*3/mm3      Basophils, Absolute 0.04 10*3/mm3      Immature Grans, Absolute 0.01 10*3/mm3      nRBC 0.0 /100 WBC              I reviewed the patient's new clinical results.    Assessment & Plan       Syncope    Essential hypertension    Coronary artery disease of native artery of native heart with stable angina pectoris    Mixed hyperlipidemia    Anxiety disorder    Chronic obstructive pulmonary disease    Gastroesophageal reflux disease    Tobacco dependence syndrome    Lung mass    - syncope likely related to orthostatic hypotension with recent BP increase in meds, including metolazone; " continue telemetry, echo pending  - outpatient follow up with pulmonologist for enlarging lung mass that was recently biopsied   - ceftriaxone for UTI  -holding meds for htn; monitor readings and restart as indicated  - elevated creatinine above baseline - hydrating gently; recheck renal function in am  - home meds for COPD  - pt eval  - scd's for dvt prophylaxis    CODE STATUS:  Code status (Patient has no pulse and is not breathing):  CPR (Attempt to Resuscitate)  Medical Interventions (Patient has pulse or is breathing):  Full Support  Level of Support Discussed with:  Patient    Admission Status:  I believe this patient meets inpatient status    Expected length of stay:  2 midnights or greater    I discussed the patient's findings and my recommendations with patient.     Francesca Pena MD  07/06/25  15:56 EDT

## 2025-07-06 NOTE — THERAPY EVALUATION
Patient Name: Lucrecia Ramesh  : 1950    MRN: [id number]                              Today's Date: 2025       Admit Date: 2025    Visit Dx:     ICD-10-CM ICD-9-CM   1. Syncope and collapse  R55 780.2   2. Intracranial hemorrhage  I62.9 432.9   3. Acute pain of left knee  M25.562 719.46   4. Acute cystitis without hematuria  N30.00 595.0   5. Panlobular emphysema  J43.1 492.8     Patient Active Problem List   Diagnosis    Chest pain    Essential hypertension    Coronary artery disease of native artery of native heart with stable angina pectoris    Mixed hyperlipidemia    Current smoker    Anxiety disorder    Cerebrovascular accident (CVA)    Chronic obstructive pulmonary disease    Gastroesophageal reflux disease    Tobacco dependence syndrome    Syncope    Lung mass     Past Medical History:   Diagnosis Date    Allergic     Angina pectoris     Arthritis     Asthma     Breast cyst     COPD (chronic obstructive pulmonary disease)     Coronary artery disease     Fibrocystic breast     GERD (gastroesophageal reflux disease)     Hyperlipidemia     Hypertension     Injury of back     Stroke     ROM slightly limited-nerve damage     Past Surgical History:   Procedure Laterality Date    BREAST CYST EXCISION      CARDIAC CATHETERIZATION      has had 2- sees Dr. Cadet    HYSTERECTOMY        General Information       Row Name 25 170          Physical Therapy Time and Intention    Document Type evaluation  -EL     Mode of Treatment individual therapy;physical therapy  -EL       Row Name 25          General Information    Patient Profile Reviewed yes  -EL     Prior Level of Function independent:;all household mobility;ADL's  -EL       Row Name 25          Living Environment    Current Living Arrangements home  -EL     People in Home child(jhonny), adult  -EL       Row Name 25 170          Home Main Entrance    Number of Stairs, Main Entrance none  -EL       Row Name 25           Stairs Within Home, Primary    Number of Stairs, Within Home, Primary none  -EL       Row Name 07/06/25 1704          Cognition    Orientation Status (Cognition) oriented x 4  -EL       Row Name 07/06/25 1704          Safety Issues/Impairments Affecting Functional Mobility    Impairments Affecting Function (Mobility) balance;pain  -EL               User Key  (r) = Recorded By, (t) = Taken By, (c) = Cosigned By      Initials Name Provider Type    EL Wilberto Morales, PT Physical Therapist                   Mobility       Row Name 07/06/25 1719          Bed Mobility    Bed Mobility bed mobility (all) activities  -EL     All Activities, [place] (Bed Mobility) modified independence  -EL     Assistive Device (Bed Mobility) bed rails  -EL       Row Name 07/06/25 1719          Bed-Chair Transfer    Bed-Chair [place] (Transfers) contact guard  -EL     Assistive Device (Bed-Chair Transfers) walker, front-wheeled  -EL       Row Name 07/06/25 1719          Sit-Stand Transfer    Sit-Stand [place] (Transfers) contact guard  -EL     Assistive Device (Sit-Stand Transfers) walker, front-wheeled  -EL       Row Name 07/06/25 1719          Gait/Stairs (Locomotion)    [place] Level (Gait) contact guard  -EL     Assistive Device (Gait) walker, front-wheeled  -EL     Patient was able to Ambulate yes  -EL     Distance in Feet (Gait) 35  -EL     Right Sided Gait Deviations weight shift ability decreased  -EL               User Key  (r) = Recorded By, (t) = Taken By, (c) = Cosigned By      Initials Name Provider Type    Wilberto Good, PT Physical Therapist                   Obj/Interventions       Row Name 07/06/25 1719          Range of Motion Comprehensive    General Range of Motion lower extremity range of motion deficits identified  -EL     Comment, General Range of Motion LLE limited by pain, 5-90*  -EL       Row Name 07/06/25 1719          Strength Comprehensive (MMT)    General Manual Muscle Testing (MMT)  Assessment lower extremity strength deficits identified  -EL     Comment, General Manual Muscle Testing (MMT) Assessment LLE 3/5, RLE 4/5 gross  -EL       Row Name 07/06/25 1719          Sensory Assessment (Somatosensory)    Sensory Assessment (Somatosensory) sensation intact  -EL               User Key  (r) = Recorded By, (t) = Taken By, (c) = Cosigned By      Initials Name Provider Type    EL Wilberto Morales, PT Physical Therapist                   Goals/Plan    No documentation.                  Clinical Impression       Row Name 07/06/25 1720          Pain    Pretreatment Pain Rating 5/10  -EL     Posttreatment Pain Rating 5/10  -EL     Pain Location knee  -EL     Pain Side/Orientation left  -       Row Name 07/06/25 1720          Plan of Care Review    Plan of Care Reviewed With patient;child  -EL     Outcome Evaluation Pt is a 74 YO F admitted after fall at home, following syncopal episode. Pt wiht pain in LLE but imaging (-) for acute injury. Pt reports living at home with daughter where she is independent with ADLs, and ambulates without AD. Pt this date demonstrates fair mobility but was reliant on RWx to offload RLE. Pt appears safe to return home with family assist, and recommendation at this time is continued RWx use at d/c. Pt does not have a RWx and will need one at d/c. Pt does not appear to have other PT needs at this time, and PT to sign off at this time.  -       Row Name 07/06/25 1720          Therapy Assessment/Plan (PT)    Criteria for Skilled Interventions Met (PT) no problems identified which require skilled intervention  -EL     Therapy Frequency (PT) evaluation only  -       Row Name 07/06/25 1720          Vital Signs    Pre Patient Position Supine  -EL     Intra Patient Position Standing  -EL     Post Patient Position Sitting  -       Row Name 07/06/25 1720          Positioning and Restraints    Pre-Treatment Position in bed  -EL     Post Treatment Position chair  -EL     In Chair  notified nsg;sitting;call light within reach;encouraged to call for assist  -EL               User Key  (r) = Recorded By, (t) = Taken By, (c) = Cosigned By      Initials Name Provider Type    Wilberto Good PT Physical Therapist                   Outcome Measures       Row Name 07/06/25 1734 07/06/25 0830       How much help from another person do you currently need...    Turning from your back to your side while in flat bed without using bedrails? 4  -EL 4  -LW    Moving from lying on back to sitting on the side of a flat bed without bedrails? 4  -EL 4  -LW    Moving to and from a bed to a chair (including a wheelchair)? 4  -EL 4  -LW    Standing up from a chair using your arms (e.g., wheelchair, bedside chair)? 4  -EL 4  -LW    Climbing 3-5 steps with a railing? 3  -EL 3  -LW    To walk in hospital room? 4  -EL 4  -LW    AM-PAC 6 Clicks Score (PT) 23  -EL 23  -LW    Highest Level of Mobility Goal Walk 25 Feet or More-7  -EL Walk 25 Feet or More-7  -LW      Row Name 07/06/25 1734          Functional Assessment    Outcome Measure Options AM-PAC 6 Clicks Basic Mobility (PT)  -EL               User Key  (r) = Recorded By, (t) = Taken By, (c) = Cosigned By      Initials Name Provider Type    Wilberto Good PT Physical Therapist    Ritu Crooks RN Registered Nurse                                 Physical Therapy Education       Title: PT OT SLP Therapies (Done)       Topic: Physical Therapy (Done)       Point: Mobility training (Done)       Learning Progress Summary            Patient Acceptance, E,TB, VU by  at 7/6/2025 1734                      Point: Precautions (Done)       Learning Progress Summary            Patient Acceptance, E,TB, VU by  at 7/6/2025 1734                                      User Key       Initials Effective Dates Name Provider Type [facility] 06/23/20 -  Wilberto Morales PT Physical Therapist PT                  PT Recommendation and Plan     Outcome Evaluation: Pt is a 74 YO F  admitted after fall at home, following syncopal episode. Pt wiht pain in LLE but imaging (-) for acute injury. Pt reports living at home with daughter where she is independent with ADLs, and ambulates without AD. Pt this date demonstrates fair mobility but was reliant on RWx to offload RLE. Pt appears safe to return home with family assist, and recommendation at this time is continued RWx use at d/c. Pt does not have a RWx and will need one at d/c. Pt does not appear to have other PT needs at this time, and PT to sign off at this time.     Time Calculation:   PT Evaluation Complexity  History, PT Evaluation Complexity: 1-2 personal factors and/or comorbidities  Examination of Body Systems (PT Eval Complexity): total of 3 or more elements  Clinical Presentation (PT Evaluation Complexity): evolving  Clinical Decision Making (PT Evaluation Complexity): moderate complexity  Overall Complexity (PT Evaluation Complexity): moderate complexity     PT Charges       Row Name 07/06/25 1735             Time Calculation    Start Time 1413  -EL      Stop Time 1430  -EL      Time Calculation (min) 17 min  -EL      PT Received On 07/06/25  -EL                User Key  (r) = Recorded By, (t) = Taken By, (c) = Cosigned By      Initials Name Provider Type    EL Wilberto Morales, PT Physical Therapist                  Therapy Charges for Today       Code Description Service Date Service Provider Modifiers Qty    [id number]  PT EVAL MOD COMPLEXITY 3 7/6/2025 Wilberto Morales, PT GP 1            PT G-Codes  Outcome Measure Options: AM-PAC 6 Clicks Basic Mobility (PT)  AM-PAC 6 Clicks Score (PT): 23  PT Discharge Summary  Anticipated Discharge Disposition (PT): home with 24/7 care    Wilberto Morales PT  7/6/2025

## 2025-07-06 NOTE — PLAN OF CARE
Problem: Adult Inpatient Plan of Care  Goal: Plan of Care Review  7/6/2025 1810 by Hina Albright RN  Outcome: Progressing   Goal Outcome Evaluation:

## 2025-07-06 NOTE — PLAN OF CARE
Problem: Adult Inpatient Plan of Care  Goal: Plan of Care Review  Outcome: Progressing  Goal: Patient-Specific Goal (Individualized)  Outcome: Progressing  Goal: Absence of Hospital-Acquired Illness or Injury  Outcome: Progressing  Intervention: Identify and Manage Fall Risk  Recent Flowsheet Documentation  Taken 7/6/2025 0433 by Elie, Judd, RN  Safety Promotion/Fall Prevention:   safety round/check completed   room organization consistent   lighting adjusted   fall prevention program maintained   clutter free environment maintained   assistive device/personal items within reach   activity supervised  Intervention: Prevent Skin Injury  Recent Flowsheet Documentation  Taken 7/6/2025 0433 by Judd Delgadillo RN  Body Position: position changed independently  Skin Protection: transparent dressing maintained  Intervention: Prevent and Manage VTE (Venous Thromboembolism) Risk  Recent Flowsheet Documentation  Taken 7/6/2025 0433 by Judd Delgadillo RN  VTE Prevention/Management:   SCDs (sequential compression devices) off   patient refused intervention  Intervention: Prevent Infection  Recent Flowsheet Documentation  Taken 7/6/2025 0433 by Judd Delgadillo RN  Infection Prevention: single patient room provided  Goal: Optimal Comfort and Wellbeing  Outcome: Progressing  Intervention: Provide Person-Centered Care  Recent Flowsheet Documentation  Taken 7/6/2025 0433 by Judd Delgadillo RN  Trust Relationship/Rapport:   care explained   questions answered   empathic listening provided   emotional support provided   choices provided   questions encouraged   reassurance provided   thoughts/feelings acknowledged  Goal: Readiness for Transition of Care  Outcome: Progressing  Intervention: Mutually Develop Transition Plan  Recent Flowsheet Documentation  Taken 7/6/2025 0341 by Judd Delgadillo RN  Transportation Anticipated: family or friend will provide  Patient/Family Anticipated Services at  Transition: none  Patient/Family Anticipates Transition to: home with family  Taken 7/6/2025 0339 by Judd Delgadillo, RN  Equipment Currently Used at Home: none   Goal Outcome Evaluation:

## 2025-07-07 ENCOUNTER — TELEPHONE (OUTPATIENT)
Dept: NEUROSURGERY | Facility: CLINIC | Age: 75
End: 2025-07-07

## 2025-07-07 DIAGNOSIS — S06.309A INTRACRANIAL HEMORRHAGE FOLLOWING INJURY, WITH LOSS OF CONSCIOUSNESS, INITIAL ENCOUNTER: Primary | ICD-10-CM

## 2025-07-07 LAB
ANION GAP SERPL CALCULATED.3IONS-SCNC: 8.4 MMOL/L (ref 5–15)
BASOPHILS # BLD AUTO: 0.03 10*3/MM3 (ref 0–0.2)
BASOPHILS NFR BLD AUTO: 0.5 % (ref 0–1.5)
BUN SERPL-MCNC: 16.3 MG/DL (ref 8–23)
BUN/CREAT SERPL: 20.9 (ref 7–25)
CALCIUM SPEC-SCNC: 8.7 MG/DL (ref 8.6–10.5)
CHLORIDE SERPL-SCNC: 102 MMOL/L (ref 98–107)
CO2 SERPL-SCNC: 21.6 MMOL/L (ref 22–29)
CREAT SERPL-MCNC: 0.78 MG/DL (ref 0.57–1)
DEPRECATED RDW RBC AUTO: 46.3 FL (ref 37–54)
EGFRCR SERPLBLD CKD-EPI 2021: 78.3 ML/MIN/1.73
EOSINOPHIL # BLD AUTO: 0.2 10*3/MM3 (ref 0–0.4)
EOSINOPHIL NFR BLD AUTO: 3.1 % (ref 0.3–6.2)
ERYTHROCYTE [DISTWIDTH] IN BLOOD BY AUTOMATED COUNT: 13.1 % (ref 12.3–15.4)
GLUCOSE SERPL-MCNC: 102 MG/DL (ref 65–99)
HCT VFR BLD AUTO: 36.5 % (ref 34–46.6)
HGB BLD-MCNC: 11.6 G/DL (ref 12–15.9)
IMM GRANULOCYTES # BLD AUTO: 0.01 10*3/MM3 (ref 0–0.05)
IMM GRANULOCYTES NFR BLD AUTO: 0.2 % (ref 0–0.5)
LYMPHOCYTES # BLD AUTO: 1.21 10*3/MM3 (ref 0.7–3.1)
LYMPHOCYTES NFR BLD AUTO: 18.5 % (ref 19.6–45.3)
MCH RBC QN AUTO: 30.3 PG (ref 26.6–33)
MCHC RBC AUTO-ENTMCNC: 31.8 G/DL (ref 31.5–35.7)
MCV RBC AUTO: 95.3 FL (ref 79–97)
MONOCYTES # BLD AUTO: 0.64 10*3/MM3 (ref 0.1–0.9)
MONOCYTES NFR BLD AUTO: 9.8 % (ref 5–12)
NEUTROPHILS NFR BLD AUTO: 4.45 10*3/MM3 (ref 1.7–7)
NEUTROPHILS NFR BLD AUTO: 67.9 % (ref 42.7–76)
NRBC BLD AUTO-RTO: 0 /100 WBC (ref 0–0.2)
PLATELET # BLD AUTO: 199 10*3/MM3 (ref 140–450)
PMV BLD AUTO: 8.7 FL (ref 6–12)
POTASSIUM SERPL-SCNC: 4.4 MMOL/L (ref 3.5–5.2)
RBC # BLD AUTO: 3.83 10*6/MM3 (ref 3.77–5.28)
SODIUM SERPL-SCNC: 132 MMOL/L (ref 136–145)
WBC NRBC COR # BLD AUTO: 6.54 10*3/MM3 (ref 3.4–10.8)

## 2025-07-07 PROCEDURE — 85025 COMPLETE CBC W/AUTO DIFF WBC: CPT | Performed by: INTERNAL MEDICINE

## 2025-07-07 PROCEDURE — 99203 OFFICE O/P NEW LOW 30 MIN: CPT

## 2025-07-07 PROCEDURE — 94761 N-INVAS EAR/PLS OXIMETRY MLT: CPT

## 2025-07-07 PROCEDURE — 80048 BASIC METABOLIC PNL TOTAL CA: CPT | Performed by: INTERNAL MEDICINE

## 2025-07-07 PROCEDURE — 99213 OFFICE O/P EST LOW 20 MIN: CPT

## 2025-07-07 PROCEDURE — 94664 DEMO&/EVAL PT USE INHALER: CPT

## 2025-07-07 PROCEDURE — 94799 UNLISTED PULMONARY SVC/PX: CPT

## 2025-07-07 PROCEDURE — 25010000002 CEFTRIAXONE PER 250 MG: Performed by: INTERNAL MEDICINE

## 2025-07-07 RX ORDER — LISINOPRIL 5 MG/1
10 TABLET ORAL
Status: DISCONTINUED | OUTPATIENT
Start: 2025-07-07 | End: 2025-07-08 | Stop reason: HOSPADM

## 2025-07-07 RX ORDER — METOPROLOL SUCCINATE 25 MG/1
25 TABLET, EXTENDED RELEASE ORAL
Status: DISCONTINUED | OUTPATIENT
Start: 2025-07-07 | End: 2025-07-08 | Stop reason: HOSPADM

## 2025-07-07 RX ADMIN — BUDESONIDE AND FORMOTEROL FUMARATE DIHYDRATE 2 PUFF: 160; 4.5 AEROSOL RESPIRATORY (INHALATION) at 07:53

## 2025-07-07 RX ADMIN — LISINOPRIL 10 MG: 5 TABLET ORAL at 08:22

## 2025-07-07 RX ADMIN — BUDESONIDE AND FORMOTEROL FUMARATE DIHYDRATE 2 PUFF: 160; 4.5 AEROSOL RESPIRATORY (INHALATION) at 18:16

## 2025-07-07 RX ADMIN — ISOSORBIDE MONONITRATE 30 MG: 30 TABLET, EXTENDED RELEASE ORAL at 08:22

## 2025-07-07 RX ADMIN — GABAPENTIN 300 MG: 300 CAPSULE ORAL at 20:49

## 2025-07-07 RX ADMIN — CEFTRIAXONE SODIUM 1000 MG: 1 INJECTION, POWDER, FOR SOLUTION INTRAMUSCULAR; INTRAVENOUS at 00:11

## 2025-07-07 RX ADMIN — Medication 10 ML: at 08:24

## 2025-07-07 RX ADMIN — Medication 10 ML: at 20:49

## 2025-07-07 RX ADMIN — METOPROLOL SUCCINATE 25 MG: 25 TABLET, EXTENDED RELEASE ORAL at 08:22

## 2025-07-07 NOTE — TELEPHONE ENCOUNTER
Patient currently in the hospital with possible head bleed.  Needs a 2-week follow-up with a new CT head at that time.  Please order CT and call patient to schedule.  Thanks

## 2025-07-07 NOTE — PLAN OF CARE
Problem: Adult Inpatient Plan of Care  Goal: Plan of Care Review  Outcome: Progressing  Goal: Patient-Specific Goal (Individualized)  Outcome: Progressing  Goal: Absence of Hospital-Acquired Illness or Injury  Outcome: Progressing  Intervention: Identify and Manage Fall Risk  Recent Flowsheet Documentation  Taken 7/7/2025 0355 by Judd Delgadillo, RN  Safety Promotion/Fall Prevention:   safety round/check completed   room organization consistent   lighting adjusted   fall prevention program maintained   clutter free environment maintained   assistive device/personal items within reach   activity supervised  Taken 7/7/2025 0200 by Judd Delgadillo, RN  Safety Promotion/Fall Prevention:   safety round/check completed   room organization consistent   lighting adjusted   fall prevention program maintained   clutter free environment maintained   assistive device/personal items within reach   activity supervised  Taken 7/6/2025 2338 by Judd Delgadillo, RN  Safety Promotion/Fall Prevention:   safety round/check completed   room organization consistent   lighting adjusted   fall prevention program maintained   clutter free environment maintained   assistive device/personal items within reach   activity supervised  Taken 7/6/2025 2236 by Judd Delgadillo, RN  Safety Promotion/Fall Prevention:   safety round/check completed   room organization consistent   lighting adjusted   fall prevention program maintained   clutter free environment maintained   assistive device/personal items within reach   activity supervised  Taken 7/6/2025 2000 by Judd Delgadillo, RN  Safety Promotion/Fall Prevention:   safety round/check completed   room organization consistent   lighting adjusted   fall prevention program maintained   clutter free environment maintained   assistive device/personal items within reach   activity supervised  Intervention: Prevent Skin Injury  Recent Flowsheet Documentation  Taken 7/7/2025 0355 by  Judd Delgadillo RN  Body Position: position changed independently  Skin Protection: transparent dressing maintained  Taken 7/6/2025 2338 by Judd Delgadillo RN  Body Position: position changed independently  Skin Protection: transparent dressing maintained  Taken 7/6/2025 2000 by Judd Delgadillo RN  Body Position: position changed independently  Skin Protection: transparent dressing maintained  Intervention: Prevent and Manage VTE (Venous Thromboembolism) Risk  Recent Flowsheet Documentation  Taken 7/7/2025 0355 by Judd Delgadillo RN  VTE Prevention/Management:   SCDs (sequential compression devices) off   patient refused intervention  Taken 7/6/2025 2338 by Judd Delgadillo RN  VTE Prevention/Management:   SCDs (sequential compression devices) off   patient refused intervention  Taken 7/6/2025 2000 by Judd Delgadillo RN  VTE Prevention/Management:   SCDs (sequential compression devices) off   patient refused intervention  Intervention: Prevent Infection  Recent Flowsheet Documentation  Taken 7/7/2025 0355 by Judd Delgadillo RN  Infection Prevention: single patient room provided  Taken 7/7/2025 0200 by Judd Delgadillo RN  Infection Prevention: single patient room provided  Taken 7/6/2025 2338 by Judd Delgadillo RN  Infection Prevention: single patient room provided  Taken 7/6/2025 2236 by Judd Delgadillo RN  Infection Prevention: single patient room provided  Taken 7/6/2025 2000 by Judd Delgadillo RN  Infection Prevention: single patient room provided  Goal: Optimal Comfort and Wellbeing  Outcome: Progressing  Intervention: Provide Person-Centered Care  Recent Flowsheet Documentation  Taken 7/7/2025 0355 by Judd Delgadillo RN  Trust Relationship/Rapport:   care explained   choices provided  Taken 7/6/2025 2000 by Judd Delgadillo RN  Trust Relationship/Rapport:   care explained   choices provided  Goal: Readiness for Transition of Care  Outcome:  Progressing     Problem: Syncope  Goal: Absence of Syncopal Symptoms  Outcome: Progressing  Intervention: Manage Effect of Syncopal Symptoms  Recent Flowsheet Documentation  Taken 7/7/2025 0355 by Judd Delgadillo, RN  Safety Promotion/Fall Prevention:   safety round/check completed   room organization consistent   lighting adjusted   fall prevention program maintained   clutter free environment maintained   assistive device/personal items within reach   activity supervised  Taken 7/7/2025 0200 by Judd Delgadillo, RN  Safety Promotion/Fall Prevention:   safety round/check completed   room organization consistent   lighting adjusted   fall prevention program maintained   clutter free environment maintained   assistive device/personal items within reach   activity supervised  Taken 7/6/2025 2338 by Judd Delgadillo, RN  Safety Promotion/Fall Prevention:   safety round/check completed   room organization consistent   lighting adjusted   fall prevention program maintained   clutter free environment maintained   assistive device/personal items within reach   activity supervised  Taken 7/6/2025 2236 by Judd Delgadillo, RN  Safety Promotion/Fall Prevention:   safety round/check completed   room organization consistent   lighting adjusted   fall prevention program maintained   clutter free environment maintained   assistive device/personal items within reach   activity supervised  Taken 7/6/2025 2000 by Judd Delgadillo, RN  Safety Promotion/Fall Prevention:   safety round/check completed   room organization consistent   lighting adjusted   fall prevention program maintained   clutter free environment maintained   assistive device/personal items within reach   activity supervised     Problem: Fall Injury Risk  Goal: Absence of Fall and Fall-Related Injury  Outcome: Progressing  Intervention: Identify and Manage Contributors  Recent Flowsheet Documentation  Taken 7/7/2025 0355 by Judd Delgadillo,  RN  Medication Review/Management: medications reviewed  Taken 7/7/2025 0200 by Judd Delgadillo RN  Medication Review/Management: medications reviewed  Taken 7/6/2025 2338 by Judd Delgadillo RN  Medication Review/Management: medications reviewed  Taken 7/6/2025 2236 by Judd Delgadillo, RN  Medication Review/Management: medications reviewed  Taken 7/6/2025 2000 by Judd Delgadillo, RN  Medication Review/Management: medications reviewed  Intervention: Promote Injury-Free Environment  Recent Flowsheet Documentation  Taken 7/7/2025 0355 by Judd Delgadillo, RN  Safety Promotion/Fall Prevention:   safety round/check completed   room organization consistent   lighting adjusted   fall prevention program maintained   clutter free environment maintained   assistive device/personal items within reach   activity supervised  Taken 7/7/2025 0200 by Judd Delgadillo RN  Safety Promotion/Fall Prevention:   safety round/check completed   room organization consistent   lighting adjusted   fall prevention program maintained   clutter free environment maintained   assistive device/personal items within reach   activity supervised  Taken 7/6/2025 2338 by Judd Delgadillo, RN  Safety Promotion/Fall Prevention:   safety round/check completed   room organization consistent   lighting adjusted   fall prevention program maintained   clutter free environment maintained   assistive device/personal items within reach   activity supervised  Taken 7/6/2025 2236 by Judd Delgadillo, RN  Safety Promotion/Fall Prevention:   safety round/check completed   room organization consistent   lighting adjusted   fall prevention program maintained   clutter free environment maintained   assistive device/personal items within reach   activity supervised  Taken 7/6/2025 2000 by Judd Delgadillo, RN  Safety Promotion/Fall Prevention:   safety round/check completed   room organization consistent   lighting adjusted   fall  prevention program maintained   clutter free environment maintained   assistive device/personal items within reach   activity supervised   Goal Outcome Evaluation:

## 2025-07-07 NOTE — PLAN OF CARE
Goal Outcome Evaluation:                 Patient was meant to d/c to home today; however, her BP was elevated this morning, so the provider chose to keep her another night. The pt was not happy about this; I explained to her why the doctor wanted to keep her overnight and she seemed accepting of this change. Lisinopril and metoprolol were added to med list today, and patient has not been hypertensive since this morning.        Problem: Adult Inpatient Plan of Care  Goal: Plan of Care Review  Outcome: Progressing  Goal: Patient-Specific Goal (Individualized)  Outcome: Progressing  Goal: Absence of Hospital-Acquired Illness or Injury  Outcome: Progressing  Intervention: Identify and Manage Fall Risk  Recent Flowsheet Documentation  Taken 7/7/2025 1552 by Attila Barrett, RN  Safety Promotion/Fall Prevention:   assistive device/personal items within reach   clutter free environment maintained   fall prevention program maintained   nonskid shoes/slippers when out of bed   safety round/check completed   room organization consistent  Taken 7/7/2025 1400 by Attila Barrett, RN  Safety Promotion/Fall Prevention:   assistive device/personal items within reach   clutter free environment maintained   fall prevention program maintained   nonskid shoes/slippers when out of bed   safety round/check completed   room organization consistent  Taken 7/7/2025 1210 by Attila Barrett, RN  Safety Promotion/Fall Prevention:   assistive device/personal items within reach   clutter free environment maintained   fall prevention program maintained   nonskid shoes/slippers when out of bed   safety round/check completed   room organization consistent  Taken 7/7/2025 0959 by Attila Barrett, RN  Safety Promotion/Fall Prevention:   assistive device/personal items within reach   clutter free environment maintained   fall prevention program maintained   nonskid shoes/slippers when out of bed   safety round/check completed   room organization  consistent  Taken 7/7/2025 0857 by Attila Barrett RN  Safety Promotion/Fall Prevention:   assistive device/personal items within reach   clutter free environment maintained   fall prevention program maintained   nonskid shoes/slippers when out of bed   room organization consistent   safety round/check completed  Intervention: Prevent Skin Injury  Recent Flowsheet Documentation  Taken 7/7/2025 0857 by Attila Barrett RN  Body Position: position changed independently  Intervention: Prevent and Manage VTE (Venous Thromboembolism) Risk  Recent Flowsheet Documentation  Taken 7/7/2025 1552 by Attila Barrett RN  VTE Prevention/Management:   SCDs (sequential compression devices) off   patient refused intervention  Taken 7/7/2025 1210 by Attila Barrett RN  VTE Prevention/Management:   SCDs (sequential compression devices) off   patient refused intervention  Taken 7/7/2025 0857 by Attila Barrett RN  VTE Prevention/Management:   SCDs (sequential compression devices) off   patient refused intervention  Intervention: Prevent Infection  Recent Flowsheet Documentation  Taken 7/7/2025 1552 by Attila Barrett RN  Infection Prevention:   environmental surveillance performed   hand hygiene promoted   rest/sleep promoted   single patient room provided  Taken 7/7/2025 1400 by Attila Barrett RN  Infection Prevention:   environmental surveillance performed   hand hygiene promoted   rest/sleep promoted   single patient room provided  Taken 7/7/2025 1210 by Attila Barrett RN  Infection Prevention:   environmental surveillance performed   hand hygiene promoted   rest/sleep promoted   single patient room provided  Taken 7/7/2025 0959 by Attila Barrett RN  Infection Prevention:   environmental surveillance performed   hand hygiene promoted   rest/sleep promoted   single patient room provided  Taken 7/7/2025 0857 by Attila Barrett RN  Infection Prevention:   environmental surveillance performed   hand hygiene promoted   rest/sleep promoted    single patient room provided  Goal: Optimal Comfort and Wellbeing  Outcome: Progressing  Intervention: Provide Person-Centered Care  Recent Flowsheet Documentation  Taken 7/7/2025 1552 by Attila Barrett RN  Trust Relationship/Rapport:   care explained   questions answered  Taken 7/7/2025 1210 by Attila Barrett RN  Trust Relationship/Rapport:   care explained   questions answered  Taken 7/7/2025 0857 by Attila Barrett RN  Trust Relationship/Rapport:   care explained   questions answered  Goal: Readiness for Transition of Care  Outcome: Progressing     Problem: Syncope  Goal: Absence of Syncopal Symptoms  Outcome: Progressing  Intervention: Manage Effect of Syncopal Symptoms  Recent Flowsheet Documentation  Taken 7/7/2025 1552 by Attila Barrett RN  Safety Promotion/Fall Prevention:   assistive device/personal items within reach   clutter free environment maintained   fall prevention program maintained   nonskid shoes/slippers when out of bed   safety round/check completed   room organization consistent  Taken 7/7/2025 1400 by Attila Barrett RN  Safety Promotion/Fall Prevention:   assistive device/personal items within reach   clutter free environment maintained   fall prevention program maintained   nonskid shoes/slippers when out of bed   safety round/check completed   room organization consistent  Taken 7/7/2025 1210 by Attila Barrett RN  Safety Promotion/Fall Prevention:   assistive device/personal items within reach   clutter free environment maintained   fall prevention program maintained   nonskid shoes/slippers when out of bed   safety round/check completed   room organization consistent  Taken 7/7/2025 0959 by Attila Barrett RN  Safety Promotion/Fall Prevention:   assistive device/personal items within reach   clutter free environment maintained   fall prevention program maintained   nonskid shoes/slippers when out of bed   safety round/check completed   room organization consistent  Taken 7/7/2025 0857 by  Shawler, Attila, RN  Safety Promotion/Fall Prevention:   assistive device/personal items within reach   clutter free environment maintained   fall prevention program maintained   nonskid shoes/slippers when out of bed   room organization consistent   safety round/check completed     Problem: Fall Injury Risk  Goal: Absence of Fall and Fall-Related Injury  Outcome: Progressing  Intervention: Identify and Manage Contributors  Recent Flowsheet Documentation  Taken 7/7/2025 0857 by Attila Barrett RN  Medication Review/Management: medications reviewed  Intervention: Promote Injury-Free Environment  Recent Flowsheet Documentation  Taken 7/7/2025 1552 by Attila Barrett RN  Safety Promotion/Fall Prevention:   assistive device/personal items within reach   clutter free environment maintained   fall prevention program maintained   nonskid shoes/slippers when out of bed   safety round/check completed   room organization consistent  Taken 7/7/2025 1400 by Attila Barrett RN  Safety Promotion/Fall Prevention:   assistive device/personal items within reach   clutter free environment maintained   fall prevention program maintained   nonskid shoes/slippers when out of bed   safety round/check completed   room organization consistent  Taken 7/7/2025 1210 by Attila Barrett RN  Safety Promotion/Fall Prevention:   assistive device/personal items within reach   clutter free environment maintained   fall prevention program maintained   nonskid shoes/slippers when out of bed   safety round/check completed   room organization consistent  Taken 7/7/2025 0959 by Attila Barrett RN  Safety Promotion/Fall Prevention:   assistive device/personal items within reach   clutter free environment maintained   fall prevention program maintained   nonskid shoes/slippers when out of bed   safety round/check completed   room organization consistent  Taken 7/7/2025 0857 by Attila Barrett RN  Safety Promotion/Fall Prevention:   assistive device/personal items  within reach   clutter free environment maintained   fall prevention program maintained   nonskid shoes/slippers when out of bed   room organization consistent   safety round/check completed

## 2025-07-07 NOTE — PROGRESS NOTES
Neurosurgery Evaluation    Date: 25     Patient: Lucrecia Ramesh     Sex: female     : 1950      SUBJECTIVE    CC: Syncopal episode    Interval history:  No adverse events overnight.  No new complaints today.      OBJECTIVE  Vitals:    25 0430 25 0752 25 0753 25 0755   BP: 162/84 170/84     BP Location: Right arm Right arm     Patient Position: Lying Lying     Pulse: 78 80 72 76   Resp: 18 18 16 16   Temp: 98.2 °F (36.8 °C) 98.1 °F (36.7 °C)     TempSrc: Oral Oral     SpO2:   96% 96%   Weight:       Height:           Physical exam    General  - Awake, cooperative, in no acute distress  Respiratory  - Normal respiratory rate and effort  NEUROLOGIC  - A/O x3  - CN II-XII grossly intact  - ZAPATA symmetrically; no focal weakness  - Sensation grossly intact      Results review  CBC          2025    22:36 2025    04:18 2025    00:19   CBC   WBC 6.83  7.96  6.54    RBC 4.24  4.10  3.83    Hemoglobin 12.7  12.5  11.6    Hematocrit 39.2  39.0  36.5    MCV 92.5  95.1  95.3    MCH 30.0  30.5  30.3    MCHC 32.4  32.1  31.8    RDW 13.2  13.1  13.1    Platelets 240  220  199        BMP          2025    22:36 2025    04:18 2025    00:19   BMP   BUN 22.1  19.9  16.3    Creatinine 1.32  1.07  0.78    Sodium 132  133  132    Potassium 4.3  4.3  4.4    Chloride 96  101  102    CO2 25.3  22.6  21.6    Calcium 9.3  8.7  8.7                   CT Head Without Contrast  Result Date: 2025  CT HEAD WO CONTRAST Date of Exam: 2025 11:43 AM EDT Indication: eval ich. Comparison: Same date at 0054 Technique: Axial CT images were obtained of the head without contrast administration.  Coronal reconstructions were performed.  Automated exposure control and iterative reconstruction methods were used. Findings: Ventricles are midline and appear appropriate in size There is extensive low-attenuation in the periventricular and subcortical white matter bilaterally There are multiple remote  lacunar infarcts noted particularly in the left thalamus and left corona radiata. Adjacent to the left thalamic lacunar infarct is seen on the previous CT there is a tiny focus of hyperdensity. This appears to be decreasing in size although I suspect this is likely due to slice selection differences. This is difficult to measure precisely because of its size measuring less than a millimeter in size. There is note made of dense parasellar carotid calcifications There is intraluminal basilar artery calcification unchanged. There is no evidence of dense MCA sign. Tiny remote lacunar infarct is evidence right basal ganglion. Calvarium is unremarkable.     Impression: Impression: Punctate hyperdensity in the left thalamus associated with remote lacunar infarct is redemonstrated although appearing decreasing in size perhaps due to slice selection differences It measured less than 1 mm on this examination. I favor a benign calcification. Tiny focus of intraparenchymal hemorrhage however continues to be in the differential diagnosis. Recommend appropriate clinical and imaging follow-up Electronically Signed: Fredy Ojeda MD  7/6/2025 2:01 PM EDT  Workstation ID: [id number]    CT Head Without Contrast  Result Date: 7/6/2025  CT HEAD WO CONTRAST Date of Exam: 7/6/2025 12:43 AM EDT Indication: Syncope, fall. Comparison: 9/21/2021. Technique: Axial CT images were obtained of the head without contrast administration.  Coronal reconstructions were performed.  Automated exposure control and iterative reconstruction methods were used. Findings: Remote lacunar infarct present within the left thalamus, stable. There is a very tiny punctate hyperdensity seen just anteriorly within the left basal ganglia measuring approximately 2 to 3 mm (series 2 image 20). No additional hyperdensity identified. No evidence of mass effect or midline shift. There is no extracerebral collection. Ventricles are normal in size and configuration for  patient's stated age.  Posterior fossa is within normal limits. Calvarium and skull base appear intact.   Visualized sinuses show no air fluid levels. Visualized orbits are unremarkable.     Impression: Impression: Tiny punctate hyperdensity seen within the left basal ganglia which appears new as compared to the previous study may represent a tiny focus of hemorrhage. This may also represent a focal calcification. No additional hyperdensity identified. No evidence of mass effect or midline shift. Short-term follow-up is recommended. Stable tiny lacunar infarct present within the adjacent left thalamus. Electronically Signed: Opal Busby MD  7/6/2025 1:14 AM EDT  Workstation ID: [id number]           ASSESSMENT/PLAN  This is a 75 y.o. female presenting after syncopal episode was found to have a small hyperdensity within the left basal ganglia on CT, consistent with calcification versus intracerebral hemorrhage.  Patient is neurologically stable.  Repeat CT head is stable.    No surgical intervention indicated.  This likely favors a calcification, though hemorrhage cannot be ruled out.  As such we will have her follow-up with outpatient neurosurgery in 2 weeks with a repeat CT head to rule out interval changes.  She return to the hospital immediately for any acute neurologic decline.      Parenchymal calcification vs punctate hemorrhage  - Follow-up with outpatient neurosurgery in 2 weeks with new CT head  - Hold all anticoagulation and platelet inhibitors  - Medical management per primary team    Neurosurgery will sign off at this time.  Call with any questions or concerns.    I discussed my assessment and recommendations with Dr. Elizabeth Nicole PA-C  07/07/25  08:22 EDT      Part of this note may be an electronic transcription/translation of spoken language to printed text using the Dragon Dictation System.

## 2025-07-07 NOTE — PROGRESS NOTES
LOS: 0 days   Patient Care Team:  Eugene Swanson MD as PCP - General    Subjective     Interval History: Renal functions at baseline    Patient Complaints: No voiced complaints    History taken from: patient    Review of Systems   Constitutional:  Positive for activity change. Negative for fatigue.   HENT:  Negative for trouble swallowing.    Eyes:  Negative for visual disturbance.   Respiratory:  Negative for cough, shortness of breath and wheezing.    Cardiovascular:  Negative for chest pain, palpitations and leg swelling.   Gastrointestinal:  Negative for abdominal pain.   Genitourinary:  Negative for difficulty urinating.   Musculoskeletal:  Negative for gait problem.   Skin:  Negative for rash.   Neurological:  Negative for weakness.   Psychiatric/Behavioral:  Negative for confusion.            Objective     Vital Signs  Temp:  [97.4 °F (36.3 °C)-98.2 °F (36.8 °C)] 97.7 °F (36.5 °C)  Heart Rate:  [70-89] 79  Resp:  [14-23] 18  BP: (121-170)/(57-87) 121/71    Physical Exam:     General Appearance:    Alert, cooperative, in no acute distress,   Head:    Normocephalic, without obvious abnormality, atraumatic   Eyes:            Lids and lashes normal, conjunctivae and sclerae normal, no   icterus, no pallor, corneas clear   Ears:    Ears appear intact with no abnormalities noted   Throat:   No oral lesions, no thrush, oral mucosa moist   Neck:   No adenopathy, supple, trachea midline, no thyromegaly, no   carotid bruit, no JVD   Lungs:     Clear to auscultation,respirations regular, even and unlabored    Heart:    Regular rhythm and normal rate, normal S1 and S2, no murmur, no gallop, no rub, no click   Chest Wall:    No abnormalities observed   Abdomen:     Normal bowel sounds, no masses, no organomegaly, soft Non-tender non-distended, no guarding,   Extremities:   Moves all extremities well, no edema, no cyanosis, no  Redness   Pulses:   Pulses palpable and equal bilaterally   Skin:   No bleeding, bruising  or rash   Lymph nodes:   No palpable adenopathy            Results Review:    Lab Results (last 24 hours)       Procedure Component Value Units Date/Time    Basic Metabolic Panel [[id number]]  (Abnormal) Collected: 07/07/25 0019    Specimen: Blood from Arm, Right Updated: 07/07/25 0057     Glucose 102 mg/dL      BUN 16.3 mg/dL      Creatinine 0.78 mg/dL      Sodium 132 mmol/L      Potassium 4.4 mmol/L      Comment: Specimen hemolyzed.  Result may be falsely elevated.        Chloride 102 mmol/L      CO2 21.6 mmol/L      Calcium 8.7 mg/dL      BUN/Creatinine Ratio 20.9     Anion Gap 8.4 mmol/L      eGFR 78.3 mL/min/1.73     Narrative:      GFR Categories in Chronic Kidney Disease (CKD)              GFR Category          GFR (mL/min/1.73)    Interpretation  G1                    90 or greater        Normal or high (1)  G2                    60-89                Mild decrease (1)  G3a                   45-59                Mild to moderate decrease  G3b                   30-44                Moderate to severe decrease  G4                    15-29                Severe decrease  G5                    14 or less           Kidney failure    (1)In the absence of evidence of kidney disease, neither GFR category G1 or G2 fulfill the criteria for CKD.    eGFR calculation 2021 CKD-EPI creatinine equation, which does not include race as a factor    CBC & Differential [[id number]]  (Abnormal) Collected: 07/07/25 0019    Specimen: Blood from Arm, Right Updated: 07/07/25 0037    Narrative:      The following orders were created for panel order CBC & Differential.  Procedure                               Abnormality         Status                     ---------                               -----------         ------                     CBC Auto Differential[[id number]]        Abnormal            Final result                 Please view results for these tests on the individual orders.    CBC Auto Differential [[id number]]  (Abnormal)  Collected: 07/07/25 0019    Specimen: Blood from Arm, Right Updated: 07/07/25 0037     WBC 6.54 10*3/mm3      RBC 3.83 10*6/mm3      Hemoglobin 11.6 g/dL      Hematocrit 36.5 %      MCV 95.3 fL      MCH 30.3 pg      MCHC 31.8 g/dL      RDW 13.1 %      RDW-SD 46.3 fl      MPV 8.7 fL      Platelets 199 10*3/mm3      Neutrophil % 67.9 %      Lymphocyte % 18.5 %      Monocyte % 9.8 %      Eosinophil % 3.1 %      Basophil % 0.5 %      Immature Grans % 0.2 %      Neutrophils, Absolute 4.45 10*3/mm3      Lymphocytes, Absolute 1.21 10*3/mm3      Monocytes, Absolute 0.64 10*3/mm3      Eosinophils, Absolute 0.20 10*3/mm3      Basophils, Absolute 0.03 10*3/mm3      Immature Grans, Absolute 0.01 10*3/mm3      nRBC 0.0 /100 WBC              Imaging Results (Last 24 Hours)       Procedure Component Value Units Date/Time    CT Head Without Contrast [[id number]] Collected: 07/06/25 1352     Updated: 07/06/25 1403    Narrative:      CT HEAD WO CONTRAST    Date of Exam: 7/6/2025 11:43 AM EDT    Indication: eval ich.    Comparison: Same date at 0054    Technique: Axial CT images were obtained of the head without contrast administration.  Coronal reconstructions were performed.  Automated exposure control and iterative reconstruction methods were used.      Findings:  Ventricles are midline and appear appropriate in size    There is extensive low-attenuation in the periventricular and subcortical white matter bilaterally    There are multiple remote lacunar infarcts noted particularly in the left thalamus and left corona radiata.    Adjacent to the left thalamic lacunar infarct is seen on the previous CT there is a tiny focus of hyperdensity. This appears to be decreasing in size although I suspect this is likely due to slice selection differences.    This is difficult to measure precisely because of its size measuring less than a millimeter in size.    There is note made of dense parasellar carotid calcifications    There is  intraluminal basilar artery calcification unchanged.    There is no evidence of dense MCA sign.    Tiny remote lacunar infarct is evidence right basal ganglion.    Calvarium is unremarkable.          Impression:      Impression:  Punctate hyperdensity in the left thalamus associated with remote lacunar infarct is redemonstrated although appearing decreasing in size perhaps due to slice selection differences    It measured less than 1 mm on this examination. I favor a benign calcification. Tiny focus of intraparenchymal hemorrhage however continues to be in the differential diagnosis. Recommend appropriate clinical and imaging follow-up          Electronically Signed: Fredy Ojeda MD    7/6/2025 2:01 PM EDT    Workstation ID: [id number]                 I reviewed the patient's new clinical results.    Medication Review:   Scheduled Meds:budesonide-formoterol, 2 puff, Inhalation, BID - RT   And  tiotropium bromide monohydrate, 2 puff, Inhalation, Daily - RT  cefTRIAXone, 1,000 mg, Intravenous, Q24H  gabapentin, 300 mg, Oral, Nightly  isosorbide mononitrate, 30 mg, Oral, Daily  lisinopril, 10 mg, Oral, Q24H  metoprolol succinate XL, 25 mg, Oral, Q24H  sodium chloride, 10 mL, Intravenous, Q12H      Continuous Infusions:   PRN Meds:.  acetaminophen **OR** acetaminophen **OR** acetaminophen    senna-docusate sodium **AND** polyethylene glycol **AND** bisacodyl **AND** bisacodyl    Calcium Replacement - Follow Nurse / BPA Driven Protocol    hydrALAZINE    Magnesium Standard Dose Replacement - Follow Nurse / BPA Driven Protocol    nitroglycerin    ondansetron ODT **OR** ondansetron    Phosphorus Replacement - Follow Nurse / BPA Driven Protocol    Potassium Replacement - Follow Nurse / BPA Driven Protocol    sodium chloride    sodium chloride     Assessment & Plan       Syncope    Essential hypertension    Coronary artery disease of native artery of native heart with stable angina pectoris    Mixed hyperlipidemia     Anxiety disorder    Chronic obstructive pulmonary disease    Gastroesophageal reflux disease    Tobacco dependence syndrome    Lung mass    -syncope likely related to orthostatic hypotension  -will restart blood pressure medications and monitor BP going forward. Added lisinopril and metoprolol today. Continue isosorbide.  -Continue telemetry  -Echo showed EF 62%, mild concentric LV hypertrophy, and mild mitral regurgitation  -renal functions back to baseline  -ceftriaxone for possible acute cystitis, culture pending  -home meds for COPD  -DVT prophylaxis with SCDs    CODE Status:    Code status (Patient has no pulse and is not breathing):  CPR (Attempt to Resuscitate)  Medical Interventions (Patient has pulse or is breathing):  Full support  Level of support discussed with:  Patient    Admission status:  I believe this patient meets inpatient status  Expected length of stay:  2 midnights or greater  I discussed the patient's findings and my recommendations with the patient.    Plan for disposition:Home    Anneliese García PA-C  07/07/25  11:34 EDT

## 2025-07-08 ENCOUNTER — READMISSION MANAGEMENT (OUTPATIENT)
Dept: CALL CENTER | Facility: HOSPITAL | Age: 75
End: 2025-07-08
Payer: MEDICARE

## 2025-07-08 VITALS
HEIGHT: 60 IN | RESPIRATION RATE: 14 BRPM | DIASTOLIC BLOOD PRESSURE: 87 MMHG | WEIGHT: 103.4 LBS | OXYGEN SATURATION: 95 % | SYSTOLIC BLOOD PRESSURE: 167 MMHG | TEMPERATURE: 98.2 F | HEART RATE: 67 BPM | BODY MASS INDEX: 20.3 KG/M2

## 2025-07-08 LAB — BACTERIA SPEC AEROBE CULT: ABNORMAL

## 2025-07-08 PROCEDURE — 25010000002 CEFTRIAXONE PER 250 MG: Performed by: INTERNAL MEDICINE

## 2025-07-08 PROCEDURE — 94664 DEMO&/EVAL PT USE INHALER: CPT

## 2025-07-08 PROCEDURE — 94761 N-INVAS EAR/PLS OXIMETRY MLT: CPT

## 2025-07-08 PROCEDURE — 94799 UNLISTED PULMONARY SVC/PX: CPT

## 2025-07-08 RX ORDER — CEPHALEXIN 500 MG/1
500 CAPSULE ORAL 2 TIMES DAILY
Qty: 6 CAPSULE | Refills: 0 | Status: SHIPPED | OUTPATIENT
Start: 2025-07-08 | End: 2025-07-11

## 2025-07-08 RX ADMIN — METOPROLOL SUCCINATE 25 MG: 25 TABLET, EXTENDED RELEASE ORAL at 08:47

## 2025-07-08 RX ADMIN — Medication 10 ML: at 08:50

## 2025-07-08 RX ADMIN — ACETAMINOPHEN 650 MG: 325 TABLET, FILM COATED ORAL at 08:46

## 2025-07-08 RX ADMIN — ISOSORBIDE MONONITRATE 30 MG: 30 TABLET, EXTENDED RELEASE ORAL at 08:47

## 2025-07-08 RX ADMIN — Medication 10 ML: at 08:51

## 2025-07-08 RX ADMIN — BUDESONIDE AND FORMOTEROL FUMARATE DIHYDRATE 2 PUFF: 160; 4.5 AEROSOL RESPIRATORY (INHALATION) at 07:41

## 2025-07-08 RX ADMIN — CEFTRIAXONE SODIUM 1000 MG: 1 INJECTION, POWDER, FOR SOLUTION INTRAMUSCULAR; INTRAVENOUS at 01:00

## 2025-07-08 RX ADMIN — LISINOPRIL 10 MG: 5 TABLET ORAL at 08:47

## 2025-07-08 NOTE — CASE MANAGEMENT/SOCIAL WORK
Continued Stay Note  [facility]     Patient Name: Lucrecia Ramesh  MRN: [id number]  Today's Date: 7/8/2025    Admit Date: 7/5/2025        Discharge Plan       Row Name 07/08/25 1151       Plan    Plan Comments Discharge  called and secured a hospital follow up appointment with PCP, Dr. Eugene Swanson on 7/9/25 at 3:15pm. AVS updated and CM made aware.           Dallas Calix, Discharge   [facility]  Care Coordination  [address] [place] [id number]  Office: [phone]  Fax: [phone]

## 2025-07-08 NOTE — TELEPHONE ENCOUNTER
S/W  Cesia  @[facility]    CT BRAIN  Date :July 23rd  Arrive:4:00  Time:4:30  Location:[facility]  Bring a list of current medications ,Photo ID and Insurance card

## 2025-07-08 NOTE — PLAN OF CARE
Goal Outcome Evaluation:  Plan of Care Reviewed With: patient           Outcome Evaluation: Pt has rested comfortably throughout the night, no c/o pain or discomfort. Currently getting IV abx for cystitis. BP remains controlled throughout the night and no syncopal episodes overnight. Pt expected to discharge home in AM. Will continue to monitor.

## 2025-07-08 NOTE — DISCHARGE INSTR - APPOINTMENTS
Follow up with Dr. Eugene Swanson on 7/9/2025 at 3:15pm.  Please arrive 15 minutes early for your appointment.  Bring current photo id, insurance cards, and a list of your medications.

## 2025-07-08 NOTE — OUTREACH NOTE
Prep Survey      Flowsheet Row Responses   Religion facility patient discharged from? [facility]   Is LACE score < 7 ? No   Eligibility Readm Mgmt   Discharge diagnosis Syncope   Does the patient have one of the following disease processes/diagnoses(primary or secondary)? Other   Does the patient have Home health ordered? No   Is there a DME ordered? Yes   What DME was ordered? [facility] for DME-pending at discharge   Prep survey completed? Yes            BILL GARCIA - Registered Nurse

## 2025-07-08 NOTE — PLAN OF CARE
Goal Outcome Evaluation:                   Problem: Adult Inpatient Plan of Care  Goal: Plan of Care Review  Outcome: Met  Goal: Patient-Specific Goal (Individualized)  Outcome: Met  Goal: Absence of Hospital-Acquired Illness or Injury  Outcome: Met  Intervention: Identify and Manage Fall Risk  Recent Flowsheet Documentation  Taken 7/8/2025 1000 by Attila Barrett RN  Safety Promotion/Fall Prevention:   assistive device/personal items within reach   clutter free environment maintained   fall prevention program maintained   nonskid shoes/slippers when out of bed   room organization consistent   safety round/check completed  Taken 7/8/2025 0846 by Attila Barrett RN  Safety Promotion/Fall Prevention:   assistive device/personal items within reach   clutter free environment maintained   fall prevention program maintained   nonskid shoes/slippers when out of bed   room organization consistent   safety round/check completed  Taken 7/8/2025 0800 by Attila Barrett RN  Safety Promotion/Fall Prevention:   assistive device/personal items within reach   clutter free environment maintained   fall prevention program maintained   nonskid shoes/slippers when out of bed   room organization consistent   safety round/check completed  Intervention: Prevent and Manage VTE (Venous Thromboembolism) Risk  Recent Flowsheet Documentation  Taken 7/8/2025 0846 by Attila Barrett RN  VTE Prevention/Management:   SCDs (sequential compression devices) off   patient refused intervention  Intervention: Prevent Infection  Recent Flowsheet Documentation  Taken 7/8/2025 1000 by Attila Barrett RN  Infection Prevention:   environmental surveillance performed   hand hygiene promoted   rest/sleep promoted   single patient room provided  Taken 7/8/2025 0846 by Attila Barrett RN  Infection Prevention:   environmental surveillance performed   hand hygiene promoted   rest/sleep promoted   single patient room provided  Taken 7/8/2025 0800 by Attila Barrett  RN  Infection Prevention:   environmental surveillance performed   hand hygiene promoted   single patient room provided   rest/sleep promoted  Goal: Optimal Comfort and Wellbeing  Outcome: Met  Intervention: Monitor Pain and Promote Comfort  Recent Flowsheet Documentation  Taken 7/8/2025 0846 by Attila Barrett RN  Pain Management Interventions: pain medication given  Intervention: Provide Person-Centered Care  Recent Flowsheet Documentation  Taken 7/8/2025 0846 by Attila Barrett RN  Trust Relationship/Rapport:   care explained   questions answered   choices provided  Goal: Readiness for Transition of Care  Outcome: Met     Problem: Syncope  Goal: Absence of Syncopal Symptoms  Outcome: Met  Intervention: Manage Effect of Syncopal Symptoms  Recent Flowsheet Documentation  Taken 7/8/2025 1000 by Attila Barrett RN  Safety Promotion/Fall Prevention:   assistive device/personal items within reach   clutter free environment maintained   fall prevention program maintained   nonskid shoes/slippers when out of bed   room organization consistent   safety round/check completed  Taken 7/8/2025 0846 by Attila Barrett RN  Supportive Measures: active listening utilized  Safety Promotion/Fall Prevention:   assistive device/personal items within reach   clutter free environment maintained   fall prevention program maintained   nonskid shoes/slippers when out of bed   room organization consistent   safety round/check completed  Taken 7/8/2025 0800 by Attila Barrett RN  Safety Promotion/Fall Prevention:   assistive device/personal items within reach   clutter free environment maintained   fall prevention program maintained   nonskid shoes/slippers when out of bed   room organization consistent   safety round/check completed     Problem: Fall Injury Risk  Goal: Absence of Fall and Fall-Related Injury  Outcome: Met  Intervention: Identify and Manage Contributors  Recent Flowsheet Documentation  Taken 7/8/2025 0846 by Attila Barrett  RN  Medication Review/Management: medications reviewed  Intervention: Promote Injury-Free Environment  Recent Flowsheet Documentation  Taken 7/8/2025 1000 by Attila Barrett, RN  Safety Promotion/Fall Prevention:   assistive device/personal items within reach   clutter free environment maintained   fall prevention program maintained   nonskid shoes/slippers when out of bed   room organization consistent   safety round/check completed  Taken 7/8/2025 0846 by Attila Barrett, RN  Safety Promotion/Fall Prevention:   assistive device/personal items within reach   clutter free environment maintained   fall prevention program maintained   nonskid shoes/slippers when out of bed   room organization consistent   safety round/check completed  Taken 7/8/2025 0800 by Attila Barrett, RN  Safety Promotion/Fall Prevention:   assistive device/personal items within reach   clutter free environment maintained   fall prevention program maintained   nonskid shoes/slippers when out of bed   room organization consistent   safety round/check completed

## 2025-07-08 NOTE — DISCHARGE SUMMARY
Date of Discharge:  7/8/2025    Discharge Diagnosis:   **Syncope [R55]   Lung mass [R91.8]   Gastroesophageal reflux disease [K21.9]   Chronic obstructive pulmonary disease [J44.9]   Anxiety disorder [F41.9]   Mixed hyperlipidemia [E78.2]   Essential hypertension [I10]   Coronary artery disease of native artery of native heart with stable angina pectoris [I25.118]   Tobacco dependence syndrome [F17.200]       Presenting Problem/History of Present Illness  Active Hospital Problems    Diagnosis  POA    **Syncope [R55]  Yes    Lung mass [R91.8]  Yes    Gastroesophageal reflux disease [K21.9]  Yes    Chronic obstructive pulmonary disease [J44.9]  Yes    Anxiety disorder [F41.9]  Yes    Mixed hyperlipidemia [E78.2]  Yes    Essential hypertension [I10]  Yes    Coronary artery disease of native artery of native heart with stable angina pectoris [I25.118]  Yes    Tobacco dependence syndrome [F17.200]  Yes      Resolved Hospital Problems   No resolved problems to display.          Hospital Course  Patient is a 75 y.o. female with multiple medical problems listed above who presented with syncopal episode after reaching overhead, which caused her to become lightheaded.  She had morning and lost consciousness briefly, injuring her left knee.  Workup was unremarkable except for hypotension.  Her outpatient antihypertensive regimen had recently been adjusted, with increasing doses of lisinopril, amlodipine and the addition of metolazone.  Unclear if she has been taking her medications consistently.  Initially her medications were held and her blood pressure recovered.  I then restarted her lisinopril and amlodipine.  Diuretics were held.  She felt well and had no recurrence of symptoms.  She will monitor her blood pressure twice a day at home and bring those readings with her to her follow-up appointment.  Have also asked her to bring her actual medication bottles with her to make sure that she is taking her medications  correctly.    Patient has a lung mass that has been previously biopsied.  There was no evidence of malignancy on that biopsy but the mass has grown slightly on imaging.  She likely should have outpatient follow-up with pulmonologist and/or PET scan related to the pulmonary nodule.    CT showed a small hyperdense lesion in her basal ganglia.  It is unclear if this is an area of calcification or possibly a very small hemorrhage.  It did not change on repeat imaging here and this likely a chronic finding.  She has a follow-up CT and appointment with neurosurgery scheduled    She had an abnormal urine bacteria..  Given her syncopal episode, I chose to treat this as a UTI.    Procedures Performed         Consults:   Consults       Date and Time Order Name Status Description    7/6/2025  1:39 AM Neurosurgery (on-call MD unless specified) Completed             Pertinent Test Results:    Lab Results (most recent)       Procedure Component Value Units Date/Time    Urine Culture - Urine, Urine, Clean Catch [[id number]]  (Abnormal)  (Susceptibility) Collected: 07/06/25 0007    Specimen: Urine, Clean Catch Updated: 07/08/25 0638     Urine Culture >100,000 CFU/mL Escherichia coli    Narrative:      Colonization of the urinary tract without infection is common. Treatment is discouraged unless the patient is symptomatic, pregnant, or undergoing an invasive urologic procedure.    Susceptibility        Escherichia coli      SERENITY      Amoxicillin + Clavulanate Susceptible      Ampicillin Resistant      Ampicillin + Sulbactam Susceptible      Cefazolin (Urine) Susceptible      Cefepime Susceptible      Ceftazidime Susceptible      Ceftriaxone Susceptible      Cefuroxime axetil Susceptible      Gentamicin Susceptible      Levofloxacin Susceptible      Nitrofurantoin Susceptible      Piperacillin + Tazobactam Susceptible      Trimethoprim + Sulfamethoxazole Susceptible                           Basic Metabolic Panel [[id number]]   (Abnormal) Collected: 07/07/25 0019    Specimen: Blood from Arm, Right Updated: 07/07/25 0057     Glucose 102 mg/dL      BUN 16.3 mg/dL      Creatinine 0.78 mg/dL      Sodium 132 mmol/L      Potassium 4.4 mmol/L      Comment: Specimen hemolyzed.  Result may be falsely elevated.        Chloride 102 mmol/L      CO2 21.6 mmol/L      Calcium 8.7 mg/dL      BUN/Creatinine Ratio 20.9     Anion Gap 8.4 mmol/L      eGFR 78.3 mL/min/1.73     Narrative:      GFR Categories in Chronic Kidney Disease (CKD)              GFR Category          GFR (mL/min/1.73)    Interpretation  G1                    90 or greater        Normal or high (1)  G2                    60-89                Mild decrease (1)  G3a                   45-59                Mild to moderate decrease  G3b                   30-44                Moderate to severe decrease  G4                    15-29                Severe decrease  G5                    14 or less           Kidney failure    (1)In the absence of evidence of kidney disease, neither GFR category G1 or G2 fulfill the criteria for CKD.    eGFR calculation 2021 CKD-EPI creatinine equation, which does not include race as a factor    CBC & Differential [[id number]]  (Abnormal) Collected: 07/07/25 0019    Specimen: Blood from Arm, Right Updated: 07/07/25 0037    Narrative:      The following orders were created for panel order CBC & Differential.  Procedure                               Abnormality         Status                     ---------                               -----------         ------                     CBC Auto Differential[[id number]]        Abnormal            Final result                 Please view results for these tests on the individual orders.    CBC Auto Differential [[id number]]  (Abnormal) Collected: 07/07/25 0019    Specimen: Blood from Arm, Right Updated: 07/07/25 0037     WBC 6.54 10*3/mm3      RBC 3.83 10*6/mm3      Hemoglobin 11.6 g/dL      Hematocrit 36.5 %      MCV 95.3 fL       MCH 30.3 pg      MCHC 31.8 g/dL      RDW 13.1 %      RDW-SD 46.3 fl      MPV 8.7 fL      Platelets 199 10*3/mm3      Neutrophil % 67.9 %      Lymphocyte % 18.5 %      Monocyte % 9.8 %      Eosinophil % 3.1 %      Basophil % 0.5 %      Immature Grans % 0.2 %      Neutrophils, Absolute 4.45 10*3/mm3      Lymphocytes, Absolute 1.21 10*3/mm3      Monocytes, Absolute 0.64 10*3/mm3      Eosinophils, Absolute 0.20 10*3/mm3      Basophils, Absolute 0.03 10*3/mm3      Immature Grans, Absolute 0.01 10*3/mm3      nRBC 0.0 /100 WBC     Basic Metabolic Panel [[id number]]  (Abnormal) Collected: 07/06/25 0418    Specimen: Blood from Arm, Right Updated: 07/06/25 0501     Glucose 112 mg/dL      BUN 19.9 mg/dL      Creatinine 1.07 mg/dL      Sodium 133 mmol/L      Potassium 4.3 mmol/L      Comment: Specimen hemolyzed.  Result may be falsely elevated.        Chloride 101 mmol/L      CO2 22.6 mmol/L      Calcium 8.7 mg/dL      BUN/Creatinine Ratio 18.6     Anion Gap 9.4 mmol/L      eGFR 53.6 mL/min/1.73     Narrative:      GFR Categories in Chronic Kidney Disease (CKD)              GFR Category          GFR (mL/min/1.73)    Interpretation  G1                    90 or greater        Normal or high (1)  G2                    60-89                Mild decrease (1)  G3a                   45-59                Mild to moderate decrease  G3b                   30-44                Moderate to severe decrease  G4                    15-29                Severe decrease  G5                    14 or less           Kidney failure    (1)In the absence of evidence of kidney disease, neither GFR category G1 or G2 fulfill the criteria for CKD.    eGFR calculation 2021 CKD-EPI creatinine equation, which does not include race as a factor    CBC (No Diff) [[id number]]  (Normal) Collected: 07/06/25 0418    Specimen: Blood from Arm, Right Updated: 07/06/25 0436     WBC 7.96 10*3/mm3      RBC 4.10 10*6/mm3      Hemoglobin 12.5 g/dL      Hematocrit 39.0 %       MCV 95.1 fL      MCH 30.5 pg      MCHC 32.1 g/dL      RDW 13.1 %      RDW-SD 45.6 fl      MPV 8.7 fL      Platelets 220 10*3/mm3     High Sensitivity Troponin T 1Hr [[id number]]  (Abnormal) Collected: 07/06/25 0146    Specimen: Blood from Arm, Left Updated: 07/06/25 0213     HS Troponin T 14 ng/L      Troponin T Numeric Delta 1 ng/L     Narrative:      High Sensitive Troponin T Reference Range:  <14.0 ng/L- Negative Female for AMI  <22.0 ng/L- Negative Male for AMI  >=14 - Abnormal Female indicating possible myocardial injury.  >=22 - Abnormal Male indicating possible myocardial injury.   Clinicians would have to utilize clinical acumen, EKG, Troponin, and serial changes to determine if it is an Acute Myocardial Infarction or myocardial injury due to an underlying chronic condition.         Urinalysis, Microscopic Only - Urine, Clean Catch [[id number]]  (Abnormal) Collected: 07/06/25 0007    Specimen: Urine, Clean Catch Updated: 07/06/25 0045     RBC, UA 0-2 /HPF      WBC, UA 21-50 /HPF      Bacteria, UA 4+ /HPF      Squamous Epithelial Cells, UA 3-6 /HPF      Hyaline Casts, UA 7-12 /LPF      Methodology Manual Light Microscopy    Urinalysis With Culture If Indicated - Urine, Clean Catch [[id number]]  (Abnormal) Collected: 07/06/25 0007    Specimen: Urine, Clean Catch Updated: 07/06/25 0028     Color, UA Dark Yellow     Appearance, UA Cloudy     pH, UA 6.0     Specific Gravity, UA 1.021     Glucose, UA Negative     Ketones, UA 15 mg/dL (1+)     Bilirubin, UA Negative     Blood, UA Negative     Protein, UA 30 mg/dL (1+)     Leuk Esterase, UA Small (1+)     Nitrite, UA Positive     Urobilinogen, UA 1.0 E.U./dL    Narrative:      In absence of clinical symptoms, the presence of pyuria, bacteria, and/or nitrites on the urinalysis result does not correlate with infection.    Comprehensive Metabolic Panel [[id number]]  (Abnormal) Collected: 07/05/25 2236    Specimen: Blood from Arm, Left Updated: 07/05/25 2307     Glucose  158 mg/dL      BUN 22.1 mg/dL      Creatinine 1.32 mg/dL      Sodium 132 mmol/L      Potassium 4.3 mmol/L      Comment: Specimen hemolyzed.  Result may be falsely elevated.        Chloride 96 mmol/L      CO2 25.3 mmol/L      Calcium 9.3 mg/dL      Total Protein 5.9 g/dL      Albumin 3.8 g/dL      ALT (SGPT) 9 U/L      AST (SGOT) 19 U/L      Comment: Specimen hemolyzed.  Result may be falsely elevated.        Alkaline Phosphatase 66 U/L      Total Bilirubin <0.2 mg/dL      Globulin 2.1 gm/dL      A/G Ratio 1.8 g/dL      BUN/Creatinine Ratio 16.7     Anion Gap 10.7 mmol/L      eGFR 41.7 mL/min/1.73     Narrative:      GFR Categories in Chronic Kidney Disease (CKD)              GFR Category          GFR (mL/min/1.73)    Interpretation  G1                    90 or greater        Normal or high (1)  G2                    60-89                Mild decrease (1)  G3a                   45-59                Mild to moderate decrease  G3b                   30-44                Moderate to severe decrease  G4                    15-29                Severe decrease  G5                    14 or less           Kidney failure    (1)In the absence of evidence of kidney disease, neither GFR category G1 or G2 fulfill the criteria for CKD.    eGFR calculation 2021 CKD-EPI creatinine equation, which does not include race as a factor    High Sensitivity Troponin T [[id number]]  (Normal) Collected: 07/05/25 2236    Specimen: Blood from Arm, Left Updated: 07/05/25 2304     HS Troponin T 13 ng/L     Narrative:      High Sensitive Troponin T Reference Range:  <14.0 ng/L- Negative Female for AMI  <22.0 ng/L- Negative Male for AMI  >=14 - Abnormal Female indicating possible myocardial injury.  >=22 - Abnormal Male indicating possible myocardial injury.   Clinicians would have to utilize clinical acumen, EKG, Troponin, and serial changes to determine if it is an Acute Myocardial Infarction or myocardial injury due to an underlying chronic  "condition.         D-dimer, Quantitative [[id number]]  (Abnormal) Collected: 07/05/25 2236    Specimen: Blood from Arm, Left Updated: 07/05/25 2257     D-Dimer, Quantitative 1.63 MCGFEU/mL     Narrative:      According to the assay 's published package insert, a normal (<0.50 MCGFEU/mL) D-dimer result in conjunction with a non-high clinical probability assessment, excludes deep vein thrombosis (DVT) and pulmonary embolism (PE) with high sensitivity.    D-dimer values increase with age and this can make VTE exclusion of an older population difficult. To address this, the [employer], based on best available evidence and recent guidelines, recommends that clinicians use age-adjusted D-dimer thresholds in patients greater than 50 years of age with: a) a low probability of PE who do not meet all Pulmonary Embolism Rule Out Criteria, or b) in those with intermediate probability of PE.   The formula for an age-adjusted D-dimer cut-off is \"age/100\".  For example, a 60 year old patient would have an age-adjusted cut-off of 0.60 MCGFEU/mL and an 80 year old 0.80 MCGFEU/mL.    CBC & Differential [[id number]]  (Normal) Collected: 07/05/25 2236    Specimen: Blood from Arm, Left Updated: 07/05/25 2243    Narrative:      The following orders were created for panel order CBC & Differential.  Procedure                               Abnormality         Status                     ---------                               -----------         ------                     CBC Auto Differential[[id number]]        Normal              Final result                 Please view results for these tests on the individual orders.    CBC Auto Differential [[id number]]  (Normal) Collected: 07/05/25 2236    Specimen: Blood from Arm, Left Updated: 07/05/25 2243     WBC 6.83 10*3/mm3      RBC 4.24 10*6/mm3      Hemoglobin 12.7 g/dL      Hematocrit 39.2 %      MCV 92.5 fL      MCH 30.0 pg      MCHC 32.4 g/dL      RDW 13.2 %      " RDW-SD 45.1 fl      MPV 8.4 fL      Platelets 240 10*3/mm3      Neutrophil % 62.9 %      Lymphocyte % 21.5 %      Monocyte % 10.1 %      Eosinophil % 4.8 %      Basophil % 0.6 %      Immature Grans % 0.1 %      Neutrophils, Absolute 4.29 10*3/mm3      Lymphocytes, Absolute 1.47 10*3/mm3      Monocytes, Absolute 0.69 10*3/mm3      Eosinophils, Absolute 0.33 10*3/mm3      Basophils, Absolute 0.04 10*3/mm3      Immature Grans, Absolute 0.01 10*3/mm3      nRBC 0.0 /100 WBC              Results for orders placed during the hospital encounter of 07/05/25    Adult Transthoracic Echo Complete W/ Cont if Necessary Per Protocol 07/06/2025 10:38 PM    Interpretation Summary    Left ventricular systolic function is normal. Calculated left ventricular EF = 62%    Left ventricular wall thickness is consistent with mild concentric hypertrophy.    Left ventricular diastolic function was normal.    Mild mitral regurgitation.    Estimated right ventricular systolic pressure from tricuspid regurgitation is normal (<35 mmHg).    Electronically signed by Jose Joshi MD, 07/06/25, 10:38 PM EDT.              Condition on Discharge:Improved, stable    Vital Signs  Temp:  [98 °F (36.7 °C)-98.5 °F (36.9 °C)] 98.2 °F (36.8 °C)  Heart Rate:  [66-89] 67  Resp:  [13-21] 14  BP: (143-167)/(75-87) 167/87    Physical Exam:     General Appearance:    Alert, cooperative, in no acute distress   Head:    Normocephalic, without obvious abnormality, atraumatic   Eyes:            Lids and lashes normal, conjunctivae and sclerae normal, no   icterus, no pallor, corneas clear, PERRLA   Ears:    Ears appear intact with no abnormalities noted   Throat:   No oral lesions, no thrush, oral mucosa moist   Neck:   No adenopathy, supple, trachea midline, no thyromegaly, no   carotid bruit, no JVD   Lungs:     Clear to auscultation,respirations regular, even and                  unlabored    Heart:    Regular rhythm and normal rate, normal  S1 and S2, no            murmur, no gallop, no rub, no click   Chest Wall:    No abnormalities observed   Abdomen:     Normal bowel sounds, no masses, no organomegaly, soft        non-tender, non-distended, no guarding, no rebound                tenderness   Extremities:   Moves all extremities well, no edema, no cyanosis, no             redness   Pulses:   Pulses palpable and equal bilaterally   Skin:   No bleeding, bruising or rash   Lymph nodes:   No palpable adenopathy   Neurologic:   Cranial nerves 2 - 12 grossly intact, sensation intact, DTR       present and equal bilaterally       Discharge Disposition  Home or Self Care    Discharge Medications     Discharge Medications        New Medications        Instructions Start Date   cephalexin 500 MG capsule  Commonly known as: KEFLEX   500 mg, Oral, 2 Times Daily             Continue These Medications        Instructions Start Date   alendronate 70 MG tablet  Commonly known as: FOSAMAX   70 mg, Every 7 Days      aspirin 81 MG chewable tablet   81 mg, Daily      cilostazol 100 MG tablet  Commonly known as: PLETAL   100 mg, 2 Times Daily      Fluticasone-Umeclidin-Vilant 100-62.5-25 MCG/INH inhaler  Commonly known as: TRELEGY   1 puff, Daily      gabapentin 300 MG capsule  Commonly known as: NEURONTIN   TAKE 1 CAPSULE BY MOUTH EVERY DAY AT BEDTIME      isosorbide mononitrate 30 MG 24 hr tablet  Commonly known as: IMDUR   30 mg, Oral, Daily      lisinopril 20 MG tablet  Commonly known as: PRINIVIL,ZESTRIL   40 mg, Daily      meloxicam 15 MG tablet  Commonly known as: MOBIC   15 mg, Daily      metoprolol succinate XL 25 MG 24 hr tablet  Commonly known as: TOPROL-XL   25 mg, Daily      simvastatin 10 MG tablet  Commonly known as: ZOCOR   10 mg, Nightly             Stop These Medications      metOLazone 2.5 MG tablet  Commonly known as: ZAROXOLYN              Discharge Diet: Regular    Activity at Discharge: No restrictions    Follow-up Appointments  Future Appointments    Date Time Provider Department Center   7/23/2025  4:30 PM [facility]   7/28/2025  1:00 PM Trae Nicole, PA [facility]     Additional Instructions for the Follow-ups that You Need to Schedule       Discharge Follow-up with PCP   As directed       Currently Documented PCP:    Eugene Swanson MD    PCP Phone Number:    [phone]     Follow Up Details: Call office to request a hospital follow up appointment                Test Results Pending at Discharge  Pending Results       None             Francesca Pena MD  07/08/25  17:33 EDT    Time: Discharge 25 min

## 2025-07-14 ENCOUNTER — READMISSION MANAGEMENT (OUTPATIENT)
Dept: CALL CENTER | Facility: HOSPITAL | Age: 75
End: 2025-07-14
Payer: MEDICARE

## 2025-07-14 NOTE — OUTREACH NOTE
Medical Week 1 Survey      Flowsheet Row Responses   [facility] patient discharged from? [facility]   Does the patient have one of the following disease processes/diagnoses(primary or secondary)? Other   Week 1 attempt successful? Yes   Call start time 1715   Call end time 1720   Discharge diagnosis Syncope   Meds reviewed with patient/caregiver? Yes   Is the patient having any side effects they believe may be caused by any medication additions or changes? No   Does the patient have all medications ordered at discharge? Yes   Is the patient taking all medications as directed (includes completed medication regime)? Yes   Does the patient have a primary care provider?  Yes   Does the patient have an appointment with their PCP within 7 days of discharge? Yes   Has the patient kept scheduled appointments due by today? Yes   Comments Saw Dr. Swanson and will see again in 2 weeks. Has appt with pulmonary on 7/17, CT of heat on 7/23, 7/28 neuro surgeon.   Psychosocial issues? No   Did the patient receive a copy of their discharge instructions? Yes   Nursing interventions Reviewed instructions with patient   What is the patient's perception of their health status since discharge? Improving   Is the patient/caregiver able to teach back signs and symptoms related to disease process for when to call PCP? Yes   Is the patient/caregiver able to teach back signs and symptoms related to disease process for when to call 911? Yes   Is the patient/caregiver able to teach back the hierarchy of who to call/visit for symptoms/problems? PCP, Specialist, Home health nurse, Urgent Care, ED, 911 Yes   Additional teach back comments States she is doing alright and using walker.  She has been keeping a bp log.   Week 1 call completed? Yes   Graduated Yes   Graduated/Revoked comments Denies questions or needs at this time.   Call end time 1720            Cesia KHAN - Licensed Nurse

## 2025-07-17 ENCOUNTER — OFFICE VISIT (OUTPATIENT)
Dept: PULMONOLOGY | Facility: HOSPITAL | Age: 75
End: 2025-07-17
Payer: MEDICARE

## 2025-07-17 VITALS
DIASTOLIC BLOOD PRESSURE: 62 MMHG | HEIGHT: 60 IN | OXYGEN SATURATION: 97 % | SYSTOLIC BLOOD PRESSURE: 117 MMHG | RESPIRATION RATE: 17 BRPM | BODY MASS INDEX: 20.22 KG/M2 | WEIGHT: 103 LBS | HEART RATE: 76 BPM

## 2025-07-17 DIAGNOSIS — R91.8 LUNG MASS: Primary | ICD-10-CM

## 2025-07-17 DIAGNOSIS — R91.8 LUNG MASS: ICD-10-CM

## 2025-07-17 DIAGNOSIS — J44.9 CHRONIC OBSTRUCTIVE PULMONARY DISEASE, UNSPECIFIED COPD TYPE: Primary | ICD-10-CM

## 2025-07-17 PROCEDURE — G0463 HOSPITAL OUTPT CLINIC VISIT: HCPCS

## 2025-07-17 RX ORDER — AMLODIPINE BESYLATE 5 MG/1
2 TABLET ORAL DAILY
COMMUNITY
Start: 2025-07-10

## 2025-07-17 NOTE — PROGRESS NOTES
PULMONARY/ CRITICAL CARE/ SLEEP MEDICINE OUTPATIENT CONSULT/ FOLLOW UP NOTE        Patient Name:  Lucrecia Ramesh    :  1950    Medical Record:  [id number]    PRIMARY CARE PHYSICIAN     Eugene Swanson MD    REASON FOR CONSULTATION    Lucrecia Ramesh is a 75 y.o. female who is referred for consultation for Lung mass  REVIEW OF SYSTEMS    Constitutional:  Denies fever or chills   Eyes:  Denies change in visual acuity   HENT:  Denies nasal congestion or sore throat   Respiratory:  Denies cough or shortness of breath   Cardiovascular:  Denies chest pain or edema   GI:  Denies abdominal pain, nausea, vomiting, bloody stools or diarrhea   :  Denies dysuria   Musculoskeletal:  Denies back pain or joint pain   Integument:  Denies rash   Neurologic:  Denies headache, focal weakness or sensory changes   Endocrine:  Denies polyuria or polydipsia   Lymphatic:  Denies swollen glands   Psychiatric:  Denies depression or anxiety     MEDICAL HISTORY    Past Medical History:   Diagnosis Date    Allergic     Angina pectoris     Arthritis     Asthma     Breast cyst     COPD (chronic obstructive pulmonary disease)     Coronary artery disease     Fibrocystic breast     GERD (gastroesophageal reflux disease)     Hyperlipidemia     Hypertension     Injury of back     Stroke     ROM slightly limited-nerve damage        SURGICAL HISTORY    Past Surgical History:   Procedure Laterality Date    BREAST CYST EXCISION      CARDIAC CATHETERIZATION      has had 2- sees Dr. Cadet    HYSTERECTOMY          FAMILY HISTORY    Family History   Problem Relation Age of Onset    Heart failure Mother     No Known Problems Father     Ovarian cancer Daughter        SOCIAL HISTORY    Social History     Tobacco Use    Smoking status: Every Day     Current packs/day: 1.50     Types: Cigarettes     Passive exposure: Current    Smokeless tobacco: Never   Substance Use Topics    Alcohol use: No        ALLERGIES    Allergies   Allergen Reactions     "Neomycin-Bacitracin Zn-Polymyx Rash    Codeine GI Intolerance         MEDICATIONS    Current Outpatient Medications on File Prior to Visit   Medication Sig Dispense Refill    alendronate (FOSAMAX) 70 MG tablet Take 1 tablet by mouth Every 7 (Seven) Days. Tues      amLODIPine (NORVASC) 5 MG tablet Take 2 tablets by mouth Daily.      aspirin 81 MG chewable tablet Chew 1 tablet Daily.      cilostazol (PLETAL) 100 MG tablet Take 1 tablet by mouth 2 (Two) Times a Day.      Fluticasone-Umeclidin-Vilant (TRELEGY) 100-62.5-25 MCG/INH inhaler Inhale 1 puff Daily.      gabapentin (NEURONTIN) 300 MG capsule TAKE 1 CAPSULE BY MOUTH EVERY DAY AT BEDTIME 90 capsule 2    isosorbide mononitrate (IMDUR) 30 MG 24 hr tablet Take 1 tablet by mouth Daily. 90 tablet 1    lisinopril (PRINIVIL,ZESTRIL) 20 MG tablet Take 2 tablets by mouth Daily.      meloxicam (MOBIC) 15 MG tablet Take 1 tablet by mouth Daily.      metoprolol succinate XL (TOPROL-XL) 25 MG 24 hr tablet Take 1 tablet by mouth Daily.      simvastatin (ZOCOR) 10 MG tablet Take 1 tablet by mouth Every Night.       No current facility-administered medications on file prior to visit.       PHYSICAL EXAM    Vitals:    07/17/25 1006   BP: 117/62   BP Location: Left arm   Patient Position: Sitting   Cuff Size: Adult   Pulse: 76   Resp: 17   SpO2: 97%   Weight: 46.7 kg (103 lb)   Height: 152.4 cm (60\")        Constitutional:  Well developed, well nourished, no acute distress, non-toxic appearance   Eyes:  PERRL, conjunctiva normal   HENT:  Atraumatic, external ears normal, nose normal, oropharynx moist, no pharyngeal exudates. mallampatti   Neck- normal range of motion, no tenderness, supple   Respiratory:  No respiratory distress, normal breath sounds, no rales, no wheezing   Cardiovascular:  Normal rate, normal rhythm, no murmurs, no gallops, no rubs   GI:  Soft, nondistended, normal bowel sounds, nontender, no organomegaly, no mass, no rebound, no guarding   :  No " costovertebral angle tenderness   Musculoskeletal:  No edema, no tenderness, no deformities. Back- no tenderness  Integument:  Well hydrated, no rash   Lymphatic:  No lymphadenopathy noted   Neurologic:  Alert & oriented x 3, CN 2-12 normal, normal motor function, normal sensory function, no focal deficits noted   Psychiatric:  Speech and behavior appropriate     CT Head Without Contrast  Result Date: 7/6/2025  Impression: Punctate hyperdensity in the left thalamus associated with remote lacunar infarct is redemonstrated although appearing decreasing in size perhaps due to slice selection differences It measured less than 1 mm on this examination. I favor a benign calcification. Tiny focus of intraparenchymal hemorrhage however continues to be in the differential diagnosis. Recommend appropriate clinical and imaging follow-up Electronically Signed: Fredy Ojeda MD  7/6/2025 2:01 PM EDT  Workstation ID: [id number]    CT Angiogram Chest Pulmonary Embolism  Result Date: 7/6/2025  Impression: 1.No evidence of pulmonary embolism. No acute cardiopulmonary process. 2.Spiculated mass present within the superior segment of the right lower lobe which appears to have increased in size as compared to the previous study. Findings are concerning for malignancy despite previous negative biopsy. No suspicious adenopathy identified. 3.Ancillary findings as described above. Electronically Signed: Opal Busby MD  7/6/2025 1:19 AM EDT  Workstation ID: [id number]    CT Head Without Contrast  Result Date: 7/6/2025  Impression: Tiny punctate hyperdensity seen within the left basal ganglia which appears new as compared to the previous study may represent a tiny focus of hemorrhage. This may also represent a focal calcification. No additional hyperdensity identified. No evidence of mass effect or midline shift. Short-term follow-up is recommended. Stable tiny lacunar infarct present within the adjacent left thalamus. Electronically Signed:  Opal Busby MD  7/6/2025 1:14 AM EDT  Workstation ID: [id number]    XR Chest 1 View  Result Date: 7/5/2025  Impression: 1.No acute cardiopulmonary process. 2.No acute osseous abnormality of the left knee. Mild to moderate osteoarthritic changes are present. Electronically Signed: Opal Busby MD  7/5/2025 11:06 PM EDT  Workstation ID: [id number]    XR Knee 3 View Left  Result Date: 7/5/2025  Impression: 1.No acute cardiopulmonary process. 2.No acute osseous abnormality of the left knee. Mild to moderate osteoarthritic changes are present. Electronically Signed: Opal Busby MD  7/5/2025 11:06 PM EDT  Workstation ID: [id number]     Results for orders placed during the hospital encounter of 07/05/25    Adult Transthoracic Echo Complete W/ Cont if Necessary Per Protocol    Interpretation Summary    Left ventricular systolic function is normal. Calculated left ventricular EF = 62%    Left ventricular wall thickness is consistent with mild concentric hypertrophy.    Left ventricular diastolic function was normal.    Mild mitral regurgitation.    Estimated right ventricular systolic pressure from tricuspid regurgitation is normal (<35 mmHg).    Electronically signed by Jose Joshi MD, 07/06/25, 10:38 PM EDT.      ASSESSMENT & PLAN:      75-year-old female with 70-pack-year history of smoking, had a lung nodule right lower lobe August 2024 with positive PET scan that underwent CT-guided FNA showed no evidence of malignancy    Tissue biopsy from right lower lobe lung nodule on 8/29/2024  Lung, right lower lobe, CT-guided core biopsy:  Alveolated lung parenchyma with hemosiderin-laden macrophages and congestion,  No evidence of malignancy    Repeat CT chest 7/5/2025 showed significant enlarged size  Spiculated mass present within the superior segment of the right lower lobe which appearsto have increased in size as compared to the previous study. Findings are concerning formalignancy despite previous  negative biopsy. No suspicious adenopathyidentified    Plan    Ion/robotic bronchoscopy, 4 7/25/2025  We will contact her primary doctor to make sure patient is not on any blood thinner medications  other option was CT-guided biopsy however patient elected to go with robotic bronchoscopy based on her experience last year                This document has been electronically signed by  Kofi Jones MD  10:28 EDT

## 2025-07-22 NOTE — PROGRESS NOTES
"Subjective     Chief Complaint   Patient presents with    SDH       HPI: Lucrecia Ramesh is a 75 y.o. female here for hospital follow-up on questionable intracranial hemorrhage.  Patient doing well since the hospital and currently has no neurologic complaints.  She denies headache, dizziness, visual disturbance, speech difficulties, and lateralizing weakness.  Their aspirin and Plavix have been on hold.  Updated CT scan completed and reviewed below.        Previous Treatment:   NSAID'S, Ice, Heat, Rest, and Nerve pain medications    Previous Injections: none    Previous Neurosurgery: none      PAST MEDICAL HISTORY:    The following portions of the patient's history were reviewed and updated as appropriate: allergies, current medications, past family history, past medical history, past social history, past surgical history, and problem list.      Review of Systems   Constitutional:  Positive for activity change and fatigue.   Eyes: Negative.    Respiratory: Negative.     Cardiovascular: Negative.    Gastrointestinal: Negative.    Endocrine: Negative.    Genitourinary: Negative.    Musculoskeletal: Negative.    Skin: Negative.    Allergic/Immunologic: Negative.    Neurological:  Positive for headaches (some).   Hematological: Negative.    Psychiatric/Behavioral: Negative.           Objective     BP (!) 184/100   Pulse 78   Resp 18   Ht 152.4 cm (60\")   Wt 47.2 kg (104 lb)   SpO2 98%   BMI 20.31 kg/m²    Body mass index is 20.31 kg/m².         Neurological Exam  Mental Status  Awake, alert and oriented to person, place and time.    Cranial Nerves  CN II: Visual acuity is normal. Visual fields full to confrontation.  CN III, IV, VI: Extraocular movements intact bilaterally. Normal lids and orbits bilaterally. Pupils equal round and reactive to light bilaterally.  CN V: Facial sensation is normal.  CN VII: Full and symmetric facial movement.  CN IX, X: Palate elevates symmetrically. Normal gag reflex.  CN XI: " Shoulder shrug strength is normal.  CN XII: Tongue midline without atrophy or fasciculations.    Motor   Strength is 5/5 throughout all four extremities.    Sensory  Sensation is intact to light touch, pinprick, vibration and proprioception in all four extremities.    Coordination  Right: Finger-to-nose normal.Left: Finger-to-nose normal.    Gait  Casual gait is normal including stance, stride, and arm swing.            Results Review  I personally reviewed and interpreted the images from the following studies:    CT Head Without Contrast  Result Date: 7/25/2025  CT CHEST WO CONTRAST DIAGNOSTIC, CT HEAD WO CONTRAST Date of Exam: 7/23/2025 4:42 PM EDT Indication: RLL nodule. Follow-up possible head bleed Comparison: CT chest with contrast 7/6/2025, CT head without contrast 7/6/2025, 7/6/2025 Technique: Axial CT images were obtained of the head and chest without contrast administration.  Sagittal and coronal reconstructions were performed.  Automated exposure control and iterative reconstruction methods were used. Findings: Head: No intracranial hemorrhage. No mass effect or midline shift. Generalized cerebral atrophy. Moderate white matter findings of chronic microvascular disease. Hypodensity in the left thalamus related to remote infarct, unchanged. Small punctate area of hyperdensity in the left thalamus measuring 1 mm likely related to punctate calcification, stable (image 25). Posterior fossa without acute abnormality. No intraventricular hemorrhage. No abnormal extra-axial fluid collection. Small area of hypodensity in the left corona radiata consistent with remote infarct, unchanged (image 33). Mastoid air cells are well-aerated. No sinus fluid level. Leftward deviation of nasal septum. Negative for calvarial fracture. Chest: Visualized noncontrast arteries of the lower neck are unremarkable. Bilateral carotid calcifications. Heart size normal. Coronary atherosclerosis. Trace pericardial effusion. No  mediastinal or hilar adenopathy. No axillary adenopathy. Trachea mainstem bronchi are patent. Emphysema. Chronic area of parenchymal scarring involving the posterior medial right upper lobe (3/48). Within the right lower lobe there is redemonstration of a spiculated area of nodularity which measures 2.1 cm (3/70). No new pulmonary nodule. No pneumothorax. Small area of parenchymal scarring in the subpleural left upper lobe stable (4/44). Stable nodule in the lingula measuring 5 mm (4/68). Chronic scarring in the basilar right lower lobe dependently unchanged. Upper abdomen demonstrates normal noncontrast visualized portions of the liver, spleen, and pancreatic tail. Adrenal thickening bilaterally unchanged. No aggressive osseous lesion or acute fracture. Chronic compression fracture at the superior plate of L2.     Impression: Impression: HEAD: 1. No intracranial hemorrhage. 2. Generalized cerebral atrophy with moderate white matter findings of chronic microvascular disease. 3. Remote infarcts involving left thalamus and left corona radiata. CHEST: 1. Spiculated 2.1 cm right lower lobe pulmonary nodule concerning for malignancy. 2. Emphysema. 3. Coronary atherosclerosis and additional chronic findings above. Electronically Signed: Vishal Mohan MD  7/25/2025 11:05 AM EDT  Workstation ID: [id number]          Assessment & Plan     MDM: Lucreciajason Ramesh is a 75 y.o. female presenting for follow-up on questionable intracranial hemorrhage.  Initially evaluated in the hospital a few weeks ago where a small punctate hypodensity was seen on CT head.  This was felt to represent calcification, though hemorrhage cannot be ruled out.  Patient has been neurologically stable and is currently not experiencing any concerning symptoms.  Updated CT scan shows no change in the 1 mm punctate hyperdensity in the left thalamus and no acute changes.    Based on updated CT a calcification is far more likely.  There is no new hemorrhage or other  concerning acute features.  As such no further serial monitoring is necessary.  Patient is fine to resume her aspirin and Plavix.  Encouraged them to reach out to the office or return to the hospital if they experience acute neurologic decline.  Otherwise may follow-up on an as-needed basis moving forward.  Patient is agreeable to the plan       Diagnosis Plan   1. Intracranial hemorrhage following injury, with loss of consciousness, subsequent encounter            Return if symptoms worsen or fail to improve.      Lucrecia Ramesh  reports that she has been smoking cigarettes. She has been exposed to tobacco smoke. She has never used smokeless tobacco.          This patient was examined wearing appropriate personal protective equipment.            Trae Nicole PA-C    07/29/25  10:04 EDT      Part of this note may be an electronic transcription/translation of spoken language to printed text using the Dragon Dictation System.

## 2025-07-23 ENCOUNTER — HOSPITAL ENCOUNTER (OUTPATIENT)
Dept: CT IMAGING | Facility: HOSPITAL | Age: 75
Discharge: HOME | End: 2025-07-23
Payer: MEDICARE

## 2025-07-23 DIAGNOSIS — S06.309A INTRACRANIAL HEMORRHAGE FOLLOWING INJURY, WITH LOSS OF CONSCIOUSNESS, INITIAL ENCOUNTER: ICD-10-CM

## 2025-07-23 DIAGNOSIS — R91.8 LUNG MASS: ICD-10-CM

## 2025-07-23 PROCEDURE — 71250 CT THORAX DX C-: CPT

## 2025-07-23 PROCEDURE — 70450 CT HEAD/BRAIN W/O DYE: CPT

## 2025-07-25 ENCOUNTER — APPOINTMENT (OUTPATIENT)
Dept: GENERAL RADIOLOGY | Facility: HOSPITAL | Age: 75
End: 2025-07-25
Payer: MEDICARE

## 2025-07-25 ENCOUNTER — PATIENT OUTREACH (OUTPATIENT)
Dept: ONCOLOGY | Facility: CLINIC | Age: 75
End: 2025-07-25
Payer: MEDICARE

## 2025-07-25 ENCOUNTER — ANESTHESIA EVENT (OUTPATIENT)
Dept: GASTROENTEROLOGY | Facility: HOSPITAL | Age: 75
End: 2025-07-25
Payer: MEDICARE

## 2025-07-25 ENCOUNTER — HOSPITAL ENCOUNTER (OUTPATIENT)
Facility: HOSPITAL | Age: 75
Setting detail: HOSPITAL OUTPATIENT SURGERY
Discharge: HOME | End: 2025-07-25
Attending: INTERNAL MEDICINE | Admitting: INTERNAL MEDICINE
Payer: MEDICARE

## 2025-07-25 ENCOUNTER — ANESTHESIA (OUTPATIENT)
Dept: GASTROENTEROLOGY | Facility: HOSPITAL | Age: 75
End: 2025-07-25
Payer: MEDICARE

## 2025-07-25 VITALS
WEIGHT: 99.21 LBS | HEART RATE: 84 BPM | BODY MASS INDEX: 19.48 KG/M2 | OXYGEN SATURATION: 92 % | RESPIRATION RATE: 11 BRPM | DIASTOLIC BLOOD PRESSURE: 61 MMHG | HEIGHT: 60 IN | SYSTOLIC BLOOD PRESSURE: 130 MMHG | TEMPERATURE: 97.6 F

## 2025-07-25 DIAGNOSIS — R91.8 LUNG MASS: ICD-10-CM

## 2025-07-25 DIAGNOSIS — C34.91 PRIMARY LUNG CANCER, RIGHT: Primary | ICD-10-CM

## 2025-07-25 DIAGNOSIS — J44.9 CHRONIC OBSTRUCTIVE PULMONARY DISEASE, UNSPECIFIED COPD TYPE: ICD-10-CM

## 2025-07-25 LAB
B PARAPERT DNA SPEC QL NAA+PROBE: NOT DETECTED
B PERT DNA SPEC QL NAA+PROBE: NOT DETECTED
BASOPHILS # BLD AUTO: 0.04 10*3/MM3 (ref 0–0.2)
BASOPHILS NFR BLD AUTO: 0.6 % (ref 0–1.5)
C PNEUM DNA NPH QL NAA+NON-PROBE: NOT DETECTED
DEPRECATED RDW RBC AUTO: 45.3 FL (ref 37–54)
EOSINOPHIL # BLD AUTO: 0.32 10*3/MM3 (ref 0–0.4)
EOSINOPHIL NFR BLD AUTO: 4.9 % (ref 0.3–6.2)
ERYTHROCYTE [DISTWIDTH] IN BLOOD BY AUTOMATED COUNT: 13.2 % (ref 12.3–15.4)
FLUAV SUBTYP SPEC NAA+PROBE: NOT DETECTED
FLUBV RNA NPH QL NAA+NON-PROBE: NOT DETECTED
HADV DNA SPEC NAA+PROBE: NOT DETECTED
HCOV 229E RNA SPEC QL NAA+PROBE: NOT DETECTED
HCOV HKU1 RNA SPEC QL NAA+PROBE: NOT DETECTED
HCOV NL63 RNA SPEC QL NAA+PROBE: NOT DETECTED
HCOV OC43 RNA SPEC QL NAA+PROBE: NOT DETECTED
HCT VFR BLD AUTO: 43.6 % (ref 34–46.6)
HGB BLD-MCNC: 14.2 G/DL (ref 12–15.9)
HMPV RNA NPH QL NAA+NON-PROBE: NOT DETECTED
HPIV1 RNA ISLT QL NAA+PROBE: NOT DETECTED
HPIV2 RNA SPEC QL NAA+PROBE: NOT DETECTED
HPIV3 RNA NPH QL NAA+PROBE: NOT DETECTED
HPIV4 P GENE NPH QL NAA+PROBE: NOT DETECTED
IMM GRANULOCYTES # BLD AUTO: 0.01 10*3/MM3 (ref 0–0.05)
IMM GRANULOCYTES NFR BLD AUTO: 0.2 % (ref 0–0.5)
LYMPHOCYTES # BLD AUTO: 1.28 10*3/MM3 (ref 0.7–3.1)
LYMPHOCYTES NFR BLD AUTO: 19.5 % (ref 19.6–45.3)
M PNEUMO IGG SER IA-ACNC: NOT DETECTED
MCH RBC QN AUTO: 30.5 PG (ref 26.6–33)
MCHC RBC AUTO-ENTMCNC: 32.6 G/DL (ref 31.5–35.7)
MCV RBC AUTO: 93.6 FL (ref 79–97)
MONOCYTES # BLD AUTO: 0.65 10*3/MM3 (ref 0.1–0.9)
MONOCYTES NFR BLD AUTO: 9.9 % (ref 5–12)
NEUTROPHILS NFR BLD AUTO: 4.28 10*3/MM3 (ref 1.7–7)
NEUTROPHILS NFR BLD AUTO: 64.9 % (ref 42.7–76)
NRBC BLD AUTO-RTO: 0 /100 WBC (ref 0–0.2)
PLATELET # BLD AUTO: 287 10*3/MM3 (ref 140–450)
PMV BLD AUTO: 8.1 FL (ref 6–12)
RBC # BLD AUTO: 4.66 10*6/MM3 (ref 3.77–5.28)
RHINOVIRUS RNA SPEC NAA+PROBE: NOT DETECTED
RSV RNA NPH QL NAA+NON-PROBE: NOT DETECTED
SARS-COV-2 RNA RESP QL NAA+PROBE: NOT DETECTED
WBC NRBC COR # BLD AUTO: 6.58 10*3/MM3 (ref 3.4–10.8)

## 2025-07-25 PROCEDURE — 87070 CULTURE OTHR SPECIMN AEROBIC: CPT | Performed by: INTERNAL MEDICINE

## 2025-07-25 PROCEDURE — 87205 SMEAR GRAM STAIN: CPT | Performed by: INTERNAL MEDICINE

## 2025-07-25 PROCEDURE — 88177 CYTP FNA EVAL EA ADDL: CPT | Performed by: INTERNAL MEDICINE

## 2025-07-25 PROCEDURE — 87116 MYCOBACTERIA CULTURE: CPT | Performed by: INTERNAL MEDICINE

## 2025-07-25 PROCEDURE — 88341 IMHCHEM/IMCYTCHM EA ADD ANTB: CPT | Performed by: INTERNAL MEDICINE

## 2025-07-25 PROCEDURE — 88342 IMHCHEM/IMCYTCHM 1ST ANTB: CPT | Performed by: INTERNAL MEDICINE

## 2025-07-25 PROCEDURE — 0202U NFCT DS 22 TRGT SARS-COV-2: CPT | Performed by: INTERNAL MEDICINE

## 2025-07-25 PROCEDURE — 87798 DETECT AGENT NOS DNA AMP: CPT | Performed by: INTERNAL MEDICINE

## 2025-07-25 PROCEDURE — 88172 CYTP DX EVAL FNA 1ST EA SITE: CPT | Performed by: INTERNAL MEDICINE

## 2025-07-25 PROCEDURE — 88305 TISSUE EXAM BY PATHOLOGIST: CPT | Performed by: INTERNAL MEDICINE

## 2025-07-25 PROCEDURE — 87206 SMEAR FLUORESCENT/ACID STAI: CPT | Performed by: INTERNAL MEDICINE

## 2025-07-25 PROCEDURE — 71045 X-RAY EXAM CHEST 1 VIEW: CPT

## 2025-07-25 PROCEDURE — 76000 FLUOROSCOPY <1 HR PHYS/QHP: CPT

## 2025-07-25 PROCEDURE — 85025 COMPLETE CBC W/AUTO DIFF WBC: CPT | Performed by: ANESTHESIOLOGY

## 2025-07-25 PROCEDURE — 25010000002 LIDOCAINE PF 2% 2 % SOLUTION: Performed by: NURSE ANESTHETIST, CERTIFIED REGISTERED

## 2025-07-25 PROCEDURE — 25010000002 ONDANSETRON PER 1 MG: Performed by: NURSE ANESTHETIST, CERTIFIED REGISTERED

## 2025-07-25 PROCEDURE — 25010000002 SUGAMMADEX 200 MG/2ML SOLUTION: Performed by: NURSE ANESTHETIST, CERTIFIED REGISTERED

## 2025-07-25 PROCEDURE — 87102 FUNGUS ISOLATION CULTURE: CPT | Performed by: INTERNAL MEDICINE

## 2025-07-25 PROCEDURE — 88108 CYTOPATH CONCENTRATE TECH: CPT | Performed by: INTERNAL MEDICINE

## 2025-07-25 PROCEDURE — 25810000003 SODIUM CHLORIDE 0.9 % SOLUTION: Performed by: NURSE ANESTHETIST, CERTIFIED REGISTERED

## 2025-07-25 PROCEDURE — 88334 PATH CONSLTJ SURG CYTO XM EA: CPT | Performed by: INTERNAL MEDICINE

## 2025-07-25 PROCEDURE — 88333 PATH CONSLTJ SURG CYTO XM 1: CPT | Performed by: INTERNAL MEDICINE

## 2025-07-25 PROCEDURE — 25010000002 PROPOFOL 10 MG/ML EMULSION: Performed by: NURSE ANESTHETIST, CERTIFIED REGISTERED

## 2025-07-25 RX ORDER — SODIUM CHLORIDE 9 MG/ML
30 INJECTION, SOLUTION INTRAVENOUS
Status: DISCONTINUED | OUTPATIENT
Start: 2025-07-25 | End: 2025-07-25 | Stop reason: HOSPADM

## 2025-07-25 RX ORDER — ONDANSETRON 2 MG/ML
INJECTION INTRAMUSCULAR; INTRAVENOUS AS NEEDED
Status: DISCONTINUED | OUTPATIENT
Start: 2025-07-25 | End: 2025-07-25 | Stop reason: SURG

## 2025-07-25 RX ORDER — ROCURONIUM BROMIDE 10 MG/ML
INJECTION, SOLUTION INTRAVENOUS AS NEEDED
Status: DISCONTINUED | OUTPATIENT
Start: 2025-07-25 | End: 2025-07-25 | Stop reason: SURG

## 2025-07-25 RX ORDER — SODIUM CHLORIDE 9 MG/ML
INJECTION, SOLUTION INTRAVENOUS CONTINUOUS PRN
Status: DISCONTINUED | OUTPATIENT
Start: 2025-07-25 | End: 2025-07-25 | Stop reason: SURG

## 2025-07-25 RX ORDER — PHENYLEPHRINE HCL IN 0.9% NACL 1 MG/10 ML
SYRINGE (ML) INTRAVENOUS AS NEEDED
Status: DISCONTINUED | OUTPATIENT
Start: 2025-07-25 | End: 2025-07-25 | Stop reason: SURG

## 2025-07-25 RX ORDER — PROPOFOL 10 MG/ML
VIAL (ML) INTRAVENOUS AS NEEDED
Status: DISCONTINUED | OUTPATIENT
Start: 2025-07-25 | End: 2025-07-25 | Stop reason: SURG

## 2025-07-25 RX ORDER — LIDOCAINE HYDROCHLORIDE 20 MG/ML
INJECTION, SOLUTION EPIDURAL; INFILTRATION; INTRACAUDAL; PERINEURAL AS NEEDED
Status: DISCONTINUED | OUTPATIENT
Start: 2025-07-25 | End: 2025-07-25 | Stop reason: SURG

## 2025-07-25 RX ADMIN — Medication 100 MCG: at 09:13

## 2025-07-25 RX ADMIN — ROCURONIUM BROMIDE 30 MG: 10 INJECTION, SOLUTION INTRAVENOUS at 09:08

## 2025-07-25 RX ADMIN — ONDANSETRON 4 MG: 2 INJECTION, SOLUTION INTRAMUSCULAR; INTRAVENOUS at 09:47

## 2025-07-25 RX ADMIN — Medication 100 MCG: at 09:21

## 2025-07-25 RX ADMIN — LIDOCAINE HYDROCHLORIDE 50 MG: 20 INJECTION, SOLUTION EPIDURAL; INFILTRATION; INTRACAUDAL; PERINEURAL at 09:08

## 2025-07-25 RX ADMIN — SUGAMMADEX 200 MG: 100 INJECTION, SOLUTION INTRAVENOUS at 09:56

## 2025-07-25 RX ADMIN — PROPOFOL 125 MCG/KG/MIN: 10 INJECTION, EMULSION INTRAVENOUS at 09:10

## 2025-07-25 RX ADMIN — Medication 200 MCG: at 09:42

## 2025-07-25 RX ADMIN — PROPOFOL 150 MG: 10 INJECTION, EMULSION INTRAVENOUS at 09:08

## 2025-07-25 RX ADMIN — SODIUM CHLORIDE: 9 INJECTION, SOLUTION INTRAVENOUS at 09:06

## 2025-07-25 RX ADMIN — Medication 100 MCG: at 09:16

## 2025-07-25 NOTE — OP NOTE
Bronchoscopy Procedure Note    Timeout was done appropriately by staff    Procedure:  Ion robotic bronchoscopy FNA right lower lobe lesion x 2 utilizing 23-gauge needle  Ion robotic bronchoscopy transbronchial biopsy utilizing cryoprobe 1.1 time 7  EBUS bronchoscopy  Right lower lobe washing    Preoperative Diagnosis: Right lower lobe mass    Postoperative Diagnosis:     Touch prep positive for malignant cells favoring adenocarcinoma      Anesthesia: General Anesthesia    Procedure Details: Patient was consented for the procedure with all risks and benefits of the procedure explained in detail.  Patient was given the opportunity to ask questions and all concerns were answered.  The bronchocope was inserted into the main airway via the endotracheal tube. An anatomical survey was done of the main airways and the subsegmental bronchus to at least the first subsegmental level of all five lobes of both lungs.  The findings are reported below.  A bronchoalveolar lavage was performed using aliquots of normal saline instilled into the airways then aspirated back.      Findings:      Started with routine bronchoscopy which was introduced through the endotracheal tube all airways were visualized to at least the first subsegment level of all 5 lobes of both lungs.  Airways were of normal size and caliber.  No endobronchial lesions seen.    Mucoid secretions noted    We retracted the regular bronchoscope    Ion robotic catheter was introduced and completed mapping    Ion catheter was directed to the lesion     Location confirmation with radial probe signal and fluoroscopy    Utilizing 23-gauge needle.  FNA  x 2 was done under direct fluoroscopy    Utilizing cryoprobe 1.1 multiple transbronchial biopsies x 7      was performed    No evidence of pneumothorax on fluoroscopy    After confirming no significant bleeding    We changed to regular bronchoscope and performed right lower lobe washing    We switched to EBUS  bronchoscopy  We did surveillance of 10R, 11R and station 7 there was normal adenopathy    No bleeding was noted    Patient tolerated procedure well        Estimated Blood Loss:  Minimal           Specimens:  Sent serosanguinous fluid                Complications:  None; patient tolerated the procedure well.           Disposition: PACU - hemodynamically stable.      Patient tolerated the procedure well.    Kofi Jones MD  7/25/2025  10:17 EDT

## 2025-07-25 NOTE — ANESTHESIA PREPROCEDURE EVALUATION
Anesthesia Evaluation     Patient summary reviewed   NPO Solid Status: > 8 hours             Airway   Dental      Pulmonary    (+) COPD, asthma,  Cardiovascular     (+) hypertension, hyperlipidemia      Neuro/Psych  (+) CVA, syncope, psychiatric history Anxiety  GI/Hepatic/Renal/Endo    (+) GERD    Musculoskeletal     Abdominal    Substance History      OB/GYN          Other   arthritis,                   Anesthesia Plan    ASA 3     general   total IV anesthesia  intravenous induction     Anesthetic plan, risks, benefits, and alternatives have been provided, discussed and informed consent has been obtained with: patient.    Plan discussed with CRNA.    CODE STATUS:

## 2025-07-25 NOTE — ANESTHESIA POSTPROCEDURE EVALUATION
Patient: Lucrecia Ramesh    Procedure Summary       Date: 07/25/25 Room / Location: [facility] ENDOSCOPY 3 / [facility] ENDOSCOPY    Anesthesia Start: 0901 Anesthesia Stop: 1007    Procedure: BRONCHOSCOPY WITH ION ROBOT WITH FINE NEEDLE ASPIRATION X1 AREA AND CRYO BIOPSY X1 AREA AND BILATERAL LUNG WASHINGS AND ENDOBRONCHIAL ULTRASOUND (Bronchus) Diagnosis:       Chronic obstructive pulmonary disease, unspecified COPD type      Lung mass      (Chronic obstructive pulmonary disease, unspecified COPD type [J44.9])      (Lung mass [R91.8])    Surgeons: Kofi Jones MD Provider: Suri Faith MD    Anesthesia Type: general ASA Status: 3            Anesthesia Type: general    Vitals  Vitals Value Taken Time   /61 07/25/25 11:05   Temp 97.6 °F (36.4 °C) 07/25/25 10:33   Pulse 74 07/25/25 11:13   Resp 11 07/25/25 10:58   SpO2 95 % 07/25/25 11:13   Vitals shown include unfiled device data.        Post Anesthesia Care and Evaluation    Patient location during evaluation: PACU  Patient participation: complete - patient participated  Level of consciousness: awake and alert  Pain management: satisfactory to patient    Airway patency: patent  Anesthetic complications: No anesthetic complications  PONV Status: none  Cardiovascular status: acceptable  Respiratory status: acceptable  Hydration status: acceptable

## 2025-07-25 NOTE — H&P
Patient Care Team:  Eugene Swanson MD as PCP - General    Chief complaint lung mass        Assessment & Plan     75-year-old female with 70-pack-year history of smoking, had a lung nodule right lower lobe August 2024 with positive PET scan that underwent CT-guided FNA showed no evidence of malignancy     Tissue biopsy from right lower lobe lung nodule on 8/29/2024  Lung, right lower lobe, CT-guided core biopsy:  Alveolated lung parenchyma with hemosiderin-laden macrophages and congestion,  No evidence of malignancy     Repeat CT chest 7/5/2025 showed significant enlarged size  Spiculated mass present within the superior segment of the right lower lobe which appearsto have increased in size as compared to the previous study. Findings are concerning formalignancy despite previous negative biopsy. No suspicious adenopathyidentified     Plan     Patient was told to hold aspirin and Pletal for 5 days  Asked with the patient and family the need for tissue diagnosis one option was repeat CT-guided biopsy however patient elected to go with robotic bronchoscopy based on her experience last year, she did not feel comfortable during the CT-guided biopsy                         History    As above      Chronic obstructive pulmonary disease    Lung mass      Past Medical History:   Diagnosis Date    Allergic     Angina pectoris     Arthritis     Asthma     Breast cyst     COPD (chronic obstructive pulmonary disease)     Coronary artery disease     Fibrocystic breast     GERD (gastroesophageal reflux disease)     Hyperlipidemia     Hypertension     Injury of back     Stroke     ROM slightly limited-nerve damage       Past Surgical History:   Procedure Laterality Date    BREAST CYST EXCISION      CARDIAC CATHETERIZATION      has had 2- sees Dr. Cadet    HYSTERECTOMY         Family History   Problem Relation Age of Onset    Heart failure Mother     No Known Problems Father     Ovarian cancer Daughter        Social History  "    Socioeconomic History    Marital status:    Tobacco Use    Smoking status: Every Day     Current packs/day: 1.50     Types: Cigarettes     Passive exposure: Current    Smokeless tobacco: Never   Vaping Use    Vaping status: Never Used   Substance and Sexual Activity    Alcohol use: No    Drug use: Never    Sexual activity: Defer       Review of Systems  Review of Systems   Respiratory:  Positive for cough. Negative for shortness of breath, wheezing and stridor.    Cardiovascular:  Negative for chest pain, palpitations and leg swelling.        Vital Signs       Physical Exam:  Physical Exam      Radiology  Imaging Results (Last 24 Hours)       ** No results found for the last 24 hours. **            Labs:            Invalid input(s): \"LABALBU\", \"PROT\"                                    Meds:   SCHEDULE    Infusions  No current facility-administered medications for this encounter.    PRNs        I discussed the patient's findings and my recommendations with patient.     Kofi Jones MD  07/25/25  08:19 EDT    Time: Critical care 60 min  "

## 2025-07-25 NOTE — DISCHARGE INSTRUCTIONS
To reach your provider for questions: Dr. Karen FREED 8am-5pm call [phone]  If after hours or weekends call [phone]     Endoscopic ultrasound and Robotic bronchoscopy with fine needle aspiration    Samples (biopsies) of the lymph nodes are taken from inside the lungs and sent for diagnostic testing.    Final results of your biopsy take up to 5 business days to process; your endoscopic physician will contact you with your results.    EBUS and robotic bronchoscopy is recommended in the following instances:  To diagnose different types of lung disorders (such as sarcoidosis or tuberculosis)  To diagnose or 'stage' cancer   To investigate enlarged lymph  nodes in the chest    Due to effects of sedation, do not drive or operate heavy machinery for 24 hours.    Avoid heavy lifting (>10 lbs.) or strenuous activity for 48 hours.    Continue to avoid non-steroidal anti-inflammatory medications (NSAIDS) for 5-7 days after the procedure unless told otherwise by your endoscopic and primary care physicians.    Some patients have a temporary sore throat after the procedure; this is a normal finding and over-the-counter lozenges help soothe symptoms.    You may resume normal diet once you are discharged.     Avoid red-colored foods for 24 hours.     You may resume your blood thinner medications (if applicable) as directed by your endoscopic and primary care physicians.    It is normal to have a very small amount of blood-tinged sputum immediately after the procedure. This should resolve within 3-4 days.    Although complications are rare, there is a small risk of pain, collapsed lung, bleeding and infection. Contact your endoscopic physician if you have these signs or symptoms:  Temperature greater than 102°F  Worsening pain not relieved by medications  Go to your nearest emergency room is you experience:  Shaking, chills or a temperature over 102°F.    New, sudden difficulty breathing.    New pain when taking a deep  breath.    New, sudden chest pain.  Worsening cough that produces large amounts of blood.

## 2025-07-25 NOTE — SIGNIFICANT NOTE
Left VM with daughter to call me back about her Oncology office visit with Dr. Baez. Patient scheduled for 7/29 at 245. Please give this information to patient or family if they call back.     Nadia Mascorro  Lung Nurse Navigator   [facility]  [phone]  [email or web address]

## 2025-07-25 NOTE — SIGNIFICANT NOTE
Dr. Jones bx today, referral to medical oncology.     Nadia Mascorro  Lung Nurse Navigator   [facility]  [phone]  [email or web address]

## 2025-07-26 LAB — NIGHT BLUE STAIN TISS: NORMAL

## 2025-07-27 LAB
BACTERIA SPEC RESP CULT: NORMAL
GRAM STN SPEC: NORMAL

## 2025-07-28 ENCOUNTER — OFFICE VISIT (OUTPATIENT)
Dept: NEUROSURGERY | Facility: CLINIC | Age: 75
End: 2025-07-28
Payer: MEDICARE

## 2025-07-28 VITALS
BODY MASS INDEX: 20.42 KG/M2 | HEIGHT: 60 IN | HEART RATE: 78 BPM | OXYGEN SATURATION: 98 % | SYSTOLIC BLOOD PRESSURE: 184 MMHG | WEIGHT: 104 LBS | RESPIRATION RATE: 18 BRPM | DIASTOLIC BLOOD PRESSURE: 100 MMHG

## 2025-07-28 DIAGNOSIS — S06.309D INTRACRANIAL HEMORRHAGE FOLLOWING INJURY, WITH LOSS OF CONSCIOUSNESS, SUBSEQUENT ENCOUNTER: Primary | ICD-10-CM

## 2025-07-28 LAB
BEAKER LAB AP INTRAOPERATIVE CONSULTATION: NORMAL
CYTO UR: NORMAL
LAB AP CASE REPORT: NORMAL
LAB AP DIAGNOSIS COMMENT: NORMAL
LAB AP DIAGNOSIS COMMENT: NORMAL
Lab: NORMAL
PATH REPORT.FINAL DX SPEC: NORMAL
PATH REPORT.GROSS SPEC: NORMAL

## 2025-07-29 ENCOUNTER — CONSULT (OUTPATIENT)
Dept: ONCOLOGY | Facility: CLINIC | Age: 75
End: 2025-07-29
Payer: MEDICARE

## 2025-07-29 VITALS
SYSTOLIC BLOOD PRESSURE: 114 MMHG | HEART RATE: 89 BPM | WEIGHT: 105.2 LBS | TEMPERATURE: 98.8 F | HEIGHT: 60 IN | OXYGEN SATURATION: 95 % | DIASTOLIC BLOOD PRESSURE: 67 MMHG | BODY MASS INDEX: 20.65 KG/M2

## 2025-07-29 DIAGNOSIS — R91.8 LUNG MASS: Primary | ICD-10-CM

## 2025-07-29 DIAGNOSIS — C34.90 MALIGNANT NEOPLASM OF LUNG, UNSPECIFIED LATERALITY, UNSPECIFIED PART OF LUNG: ICD-10-CM

## 2025-07-29 LAB
P JIROVECII DNA L RESP QL NAA+NON-PROBE: NEGATIVE
REF LAB TEST METHOD: NORMAL

## 2025-07-29 RX ORDER — CLOPIDOGREL BISULFATE 75 MG/1
75 TABLET ORAL DAILY
Status: ON HOLD | COMMUNITY
Start: 2025-07-28

## 2025-07-30 ENCOUNTER — PATIENT OUTREACH (OUTPATIENT)
Dept: ONCOLOGY | Facility: CLINIC | Age: 75
End: 2025-07-30
Payer: MEDICARE

## 2025-07-30 NOTE — SIGNIFICANT NOTE
Scheduled patient's scans and MD appts. Left VM for daughter, unable to leave patient VM.     Nadia Mascorro  Lung Nurse Navigator   [facility]  [phone]  [email or web address]

## 2025-07-30 NOTE — SIGNIFICANT NOTE
Reached patient's daughter. Made her aware of MRI brain, PET and Gul and [employer] CORNELIUS appts. She verbalized understanding. She has my direct number for any questions or concerns.     Nadia Mascorro  Lung Nurse Navigator   [facility]  [phone]  [email or web address]     Xenograft Text: The defect edges were debeveled with a #15 scalpel blade.  Given the location of the defect, shape of the defect and the proximity to free margins a xenograft was deemed most appropriate.  The graft was then trimmed to fit the size of the defect.  The graft was then placed in the primary defect and oriented appropriately.

## 2025-07-31 ENCOUNTER — HOSPITAL ENCOUNTER (OUTPATIENT)
Dept: MRI IMAGING | Facility: HOSPITAL | Age: 75
Discharge: HOME | End: 2025-07-31
Admitting: STUDENT IN AN ORGANIZED HEALTH CARE EDUCATION/TRAINING PROGRAM
Payer: MEDICARE

## 2025-07-31 DIAGNOSIS — R91.8 LUNG MASS: ICD-10-CM

## 2025-07-31 DIAGNOSIS — C34.90 MALIGNANT NEOPLASM OF LUNG, UNSPECIFIED LATERALITY, UNSPECIFIED PART OF LUNG: ICD-10-CM

## 2025-07-31 PROBLEM — C34.31 MALIGNANT NEOPLASM OF LOWER LOBE OF RIGHT LUNG: Status: ACTIVE | Noted: 2025-07-31

## 2025-07-31 PROCEDURE — 25010000002 GADOTERIDOL PER 1 ML: Performed by: STUDENT IN AN ORGANIZED HEALTH CARE EDUCATION/TRAINING PROGRAM

## 2025-07-31 PROCEDURE — A9579 GAD-BASE MR CONTRAST NOS,1ML: HCPCS | Performed by: STUDENT IN AN ORGANIZED HEALTH CARE EDUCATION/TRAINING PROGRAM

## 2025-07-31 PROCEDURE — 70553 MRI BRAIN STEM W/O & W/DYE: CPT

## 2025-07-31 RX ORDER — GADOTERIDOL 279.3 MG/ML
15 INJECTION INTRAVENOUS
Status: COMPLETED | OUTPATIENT
Start: 2025-07-31 | End: 2025-07-31

## 2025-07-31 RX ADMIN — GADOTERIDOL 9 ML: 279.3 INJECTION, SOLUTION INTRAVENOUS at 10:23

## 2025-07-31 NOTE — PROGRESS NOTES
[facility] RADIATION ONCOLOGY  CONSULTATION    Name: Lucrecia Ramesh  YOB: 1950  MRN #: [id number]  Date of Service: 8/3/2025  Referring Provider: Navjot Baez MD     Chief Complaint:  RLL lung cancer, initial evaluation    Diagnosis:    Encounter Diagnosis   Name Primary?    Malignant neoplasm of lower lobe of right lung Yes       Cancer Staging   Stage IA3 (cT1c, cN0, cM0) squamous cell carcinoma of the right lower lobe lung       ICD?: no  Prior XRT?: no  Contraindications?: no  HCG needed?: No, patient is female and >55 years and/or has undergone hysterectomy        History of Present Illness       Lucrecia Ramesh is a 75 y.o. female who was diagnosed with StageIA3 (cT1c, N0, M0) invasive moderately to poorly differentiated squamous cell carcinoma of the right lower lobe in July 2025 after completing work-up for suspicious lesion seen on recent imaging. She presents to our clinic today to discuss the role of radiation therapy in the management of her disease.    7/6/2025: Patient presented to [facility] ED after a syncopal episode. CT Head showed a tiny punctate hyperdensity in the left basal ganglia new since prior and may represent a tiny focus of hemorrhage or focal calcification, no evidence of mass or midline shift. CT Angiogram Chest showed no evidence of pulmonary embolism or acute cardiopulmonary process, but there was a spiculated mass in the RLL that has increased in size that is concerning for malignancy despite previous negative biopsy. No suspicious adenopathy was noted.    7/23/2025: CT Chest again showed the 2.1 cm spiculated RLL pulmonary nodule concerning for malignancy. CT Head was negative for hemorrhage, generalized cerebral atrophy with moderate white matter findings of chronic microvascular disease, and remote infarcts involving the left thalamus and left corona radiata.      7/31/2025: MRI Brain showed no evidence of intracranial metastatic disease.      8/1/2025: PET/CT was performed. The report was not available at the time of our visit.     Initial evaluation by Dr. Vikas Rebollar scheduled on 8/5/2025. PFTs are scheduled on 8/7/2025.    The patient is here to learn about the role of radiation therapy in the management of her disease.       Imaging       MRI Brain With and Without Contrast Diagnostic  Result Date: 7/31/2025  IMPRESSION:  Impression:  No evidence of intracranial metastatic disease. Moderate senescent and chronic findings are present as above.      CT Chest Without Contrast Diagnostic  CT Head Without Contrast  Result Date: 7/25/2025  Impression: HEAD: 1. No intracranial hemorrhage. 2. Generalized cerebral atrophy with moderate white matter findings of chronic microvascular disease. 3. Remote infarcts involving left thalamus and left corona radiata. CHEST: 1. Spiculated 2.1 cm right lower lobe pulmonary nodule concerning for malignancy. 2. Emphysema. 3. Coronary atherosclerosis and additional chronic findings above.     CT Head Without Contrast  Result Date: 7/6/2025  Impression: Punctate hyperdensity in the left thalamus associated with remote lacunar infarct is redemonstrated although appearing decreasing in size perhaps due to slice selection differences It measured less than 1 mm on this examination. I favor a benign calcification. Tiny focus of intraparenchymal hemorrhage however continues to be in the differential diagnosis. Recommend appropriate clinical and imaging follow-up     CT Angiogram Chest Pulmonary Embolism  Result Date: 7/6/2025  Impression: 1.No evidence of pulmonary embolism. No acute cardiopulmonary process. 2.Spiculated mass present within the superior segment of the right lower lobe which appears to have increased in size as compared to the previous study. Findings are concerning for malignancy despite previous negative biopsy. No suspicious adenopathy identified. 3.Ancillary findings as described above.     CT Head Without  Contrast  Result Date: 7/6/2025  Impression: Tiny punctate hyperdensity seen within the left basal ganglia which appears new as compared to the previous study may represent a tiny focus of hemorrhage. This may also represent a focal calcification. No additional hyperdensity identified. No evidence of mass effect or midline shift. Short-term follow-up is recommended. Stable tiny lacunar infarct present within the adjacent left thalamus.         Pathology       Tissue Pathology Exam  Date: 7/25/2025  Final Diagnosis   Mass, right lower lobe, biopsies:     Invasive moderate to poorly differentiated squamous cell carcinoma, see comment   Electronically signed by Long Gaston MD on 7/28/2025 at 1507 EDT   Comment    Immunohistochemical stains were performed with valid controls and are reported as follows: The tumor is positive for cytokeratin 7, cytokeratin 5/6 and p63.  The tumor is negative for TTF-1 and Napsin.  The histomorphology and immunohistochemical staining pattern support the rendered diagnosis.  KATHY       Fine Needle Aspiration  Date: 7/25/2025  Final Diagnosis   Mass, right lower lobe, fine-needle aspiration with smears and cell block:     Malignant cells present consistent with non-small cell carcinoma, see comment   Electronically signed by Long Gastno MD on 7/28/2025 at 1023 EDT   Comment    Clinical correlation with concurrent surgical case [id number] (right lower lobe mass) is recommended for additional details including immunohistochemical workup.  KATHY       Non-gynecologic Cytology  Date: 7/25/2025  Final Diagnosis   Right lower lobe, bronchial wash with smears and cytospin:     Acute inflammation, pulmonary macrophages and bronchial epithelial cells     No malignancy identified   Electronically signed by Long Gaston MD on 7/28/2025 at 1020 EDT         Laboratory Values       Hematology:  WBC   Date Value Ref Range Status   07/25/2025 6.58 3.40 - 10.80 10*3/mm3 Final     RBC   Date Value Ref Range  Status   07/25/2025 4.66 3.77 - 5.28 10*6/mm3 Final     Hemoglobin   Date Value Ref Range Status   07/25/2025 14.2 12.0 - 15.9 g/dL Final     Hematocrit   Date Value Ref Range Status   07/25/2025 43.6 34.0 - 46.6 % Final     Platelets   Date Value Ref Range Status   07/25/2025 287 140 - 450 10*3/mm3 Final     Chemistry:  Lab Results   Component Value Date    GLUCOSE 102 (H) 07/07/2025    BUN 16.3 07/07/2025    CREATININE 0.78 07/07/2025    EGFRIFNONA 63 05/17/2020    BCR 20.9 07/07/2025    K 4.4 07/07/2025    CO2 21.6 (L) 07/07/2025    CALCIUM 8.7 07/07/2025    ALBUMIN 3.8 07/05/2025    AST 19 07/05/2025    ALT 9 07/05/2025         Problem List       Patient Active Problem List   Diagnosis    Chest pain    Essential hypertension    Coronary artery disease of native artery of native heart with stable angina pectoris    Mixed hyperlipidemia    Current smoker    Anxiety disorder    Cerebrovascular accident (CVA)    Chronic obstructive pulmonary disease    Gastroesophageal reflux disease    Tobacco dependence syndrome    Syncope    Lung mass    Malignant neoplasm of lower lobe of right lung          Current Medications        Current Outpatient Medications   Medication Instructions    alendronate (FOSAMAX) 70 mg, Every 7 Days    amLODIPine (NORVASC) 5 MG tablet 2 tablets, Daily    aspirin 81 mg, Daily    cilostazol (PLETAL) 100 mg, 2 Times Daily    clopidogrel (PLAVIX) 75 MG tablet     Fluticasone-Umeclidin-Vilant (TRELEGY) 100-62.5-25 MCG/INH inhaler 1 puff, Daily    gabapentin (NEURONTIN) 300 MG capsule TAKE 1 CAPSULE BY MOUTH EVERY DAY AT BEDTIME    isosorbide mononitrate (IMDUR) 30 mg, Oral, Daily    lisinopril (PRINIVIL,ZESTRIL) 40 mg, Daily    meloxicam (MOBIC) 15 mg, Daily    metoprolol succinate XL (TOPROL-XL) 25 mg, Daily    ondansetron (ZOFRAN) 4 mg, Oral, Every 8 Hours PRN    simvastatin (ZOCOR) 10 mg, Nightly    [START ON 8/29/2025] varenicline (CHANTIX) 1 mg, Oral, 2 Times Daily    Varenicline Tartrate,  Starter, 0.5 MG X 11 & 1 MG X 42 tablet therapy pack Take 0.5 mg by mouth Daily for 3 days, THEN 0.5 mg 2 (Two) Times a Day for 4 days, THEN 1 mg 2 (Two) Times a Day for 21 days. Take 0.5 mg po daily x 3 days, then 0.5 mg po bid x 4 days, then 1 mg po bid          Allergies       Allergies   Allergen Reactions    Latex Rash    Neomycin-Bacitracin Zn-Polymyx Rash    Codeine GI Intolerance         Family History       Family History   Problem Relation Age of Onset    Heart failure Mother     No Known Problems Father     Ovarian cancer Daughter           Social History       Social History     Tobacco Use    Smoking status: Every Day     Current packs/day: 1.50     Types: Cigarettes     Passive exposure: Current    Smokeless tobacco: Never   Vaping Use    Vaping status: Never Used   Substance Use Topics    Alcohol use: No    Drug use: Never          Review of Systems       Review of Systems   Constitutional:  Positive for activity change and fatigue.   Psychiatric/Behavioral:  The patient is nervous/anxious.       Vitals:    08/01/25 1117   BP: 131/73   Pulse: 84   Resp: 18   SpO2: 96%      Physical Exam  Constitutional:       General: She is not in acute distress.  HENT:      Head: Normocephalic and atraumatic.   Pulmonary:      Effort: Pulmonary effort is normal. No respiratory distress.   Neurological:      Mental Status: She is alert and oriented to person, place, and time. Mental status is at baseline.   Psychiatric:         Mood and Affect: Mood normal.         Behavior: Behavior normal.          ECOG:  Restricted in physically strenuous activity but ambulatory and able to carry out work of a light or sedentary nature, e.g., light house work, office work = 1        Assessment and Plan       Assessment:      Lucrecia Ramesh is a 75 y.o. female who was diagnosed with StageIA3 (cT1c, N0, M0) invasive moderately to poorly differentiated squamous cell carcinoma of the right lower lobe in July 2025 after completing  work-up for suspicious lesion seen on recent imaging. She presents to our clinic today to discuss the role of radiation therapy in the management of her disease.    Diagnoses and all orders for this visit:    1. Malignant neoplasm of lower lobe of right lung (Primary)    Other orders  -     Varenicline Tartrate, Starter, 0.5 MG X 11 & 1 MG X 42 tablet therapy pack; Take 0.5 mg by mouth Daily for 3 days, THEN 0.5 mg 2 (Two) Times a Day for 4 days, THEN 1 mg 2 (Two) Times a Day for 21 days. Take 0.5 mg po daily x 3 days, then 0.5 mg po bid x 4 days, then 1 mg po bid  Dispense: 1 each; Refill: 0  -     varenicline (CHANTIX) 1 MG tablet; Take 1 tablet by mouth 2 (Two) Times a Day for 140 days.  Dispense: 56 tablet; Refill: 4  -     ondansetron (Zofran) 4 MG tablet; Take 1 tablet by mouth Every 8 (Eight) Hours As Needed for Nausea or Vomiting.  Dispense: 60 tablet; Refill: 6       Plan:      Orders:  - Follow-up after tumor board discussion and to finalize treatment recommendations    We reviewed the patient's diagnosis and work-up to date. We discussed the finding of a right lower lobe stage IA3 squamous cell carcinoma. We discussed additional work-up including the need to see the official PET report to complete the staging. The patient is also scheduled for PFTs. The patient is meeting with Dr. Rebollar next week to discuss surgical options for her lung cancer. We discussed surgery is the gold standard for managing early stage lung cancer. We discussed radiation can also be used to treat early stage lung cancer for patient's who are not surgical candidates or who choose not to undergo surgery. We will wait to see the final PET report and will discuss the patient's case at the upcoming tumor board. I will follow-up with the patient after tumor board and her visit with Dr. Rebollar to finalize treatment options. She is encouraged to reach out with questions or concerns prior to her next appointment.       We discussed how  radiation therapy is planned and delivered. We discussed potential acute and late side effects. We discussed typical treatment techniques and fractionation schedules. The patient has limited availability for appointments. We discussed the possibility of single fraction SBRT and the data supporting this approach. If she receives radiation, she would like to get single fraction treatment.       I spent 60 minutes caring for Lucrecia on this date of service. This time includes time spent by me in the following activities:preparing for the visit, reviewing tests, obtaining and/or reviewing a separately obtained history, performing a medically appropriate examination and/or evaluation , counseling and educating the patient/family/caregiver, ordering medications, tests, or procedures, documenting information in the medical record, independently interpreting results and communicating that information with the patient/family/caregiver, and care coordination        Follow-up       No follow-ups on file.       CC: Navjot Baez MD

## 2025-08-01 ENCOUNTER — CONSULT (OUTPATIENT)
Dept: RADIATION ONCOLOGY | Facility: HOSPITAL | Age: 75
End: 2025-08-01
Payer: MEDICARE

## 2025-08-01 ENCOUNTER — HOSPITAL ENCOUNTER (OUTPATIENT)
Dept: PET IMAGING | Facility: HOSPITAL | Age: 75
Discharge: HOME | End: 2025-08-01
Payer: MEDICARE

## 2025-08-01 VITALS
DIASTOLIC BLOOD PRESSURE: 73 MMHG | RESPIRATION RATE: 18 BRPM | WEIGHT: 102 LBS | SYSTOLIC BLOOD PRESSURE: 131 MMHG | HEART RATE: 84 BPM | OXYGEN SATURATION: 96 % | BODY MASS INDEX: 19.92 KG/M2

## 2025-08-01 DIAGNOSIS — C34.90 MALIGNANT NEOPLASM OF LUNG, UNSPECIFIED LATERALITY, UNSPECIFIED PART OF LUNG: ICD-10-CM

## 2025-08-01 DIAGNOSIS — C34.31 MALIGNANT NEOPLASM OF LOWER LOBE OF RIGHT LUNG: Primary | ICD-10-CM

## 2025-08-01 DIAGNOSIS — R91.8 LUNG MASS: ICD-10-CM

## 2025-08-01 LAB
FUNGUS WND CULT: NORMAL
GLUCOSE BLDC GLUCOMTR-MCNC: 108 MG/DL (ref 70–105)
MYCOBACTERIUM SPEC CULT: NORMAL
NIGHT BLUE STAIN TISS: NORMAL

## 2025-08-01 PROCEDURE — 78815 PET IMAGE W/CT SKULL-THIGH: CPT

## 2025-08-01 PROCEDURE — 82948 REAGENT STRIP/BLOOD GLUCOSE: CPT

## 2025-08-01 PROCEDURE — 25510000001 IOPAMIDOL PER 1 ML: Performed by: STUDENT IN AN ORGANIZED HEALTH CARE EDUCATION/TRAINING PROGRAM

## 2025-08-01 PROCEDURE — G0463 HOSPITAL OUTPT CLINIC VISIT: HCPCS | Performed by: INTERNAL MEDICINE

## 2025-08-01 RX ORDER — ONDANSETRON 4 MG/1
4 TABLET, FILM COATED ORAL EVERY 8 HOURS PRN
Qty: 60 TABLET | Refills: 6 | Status: SHIPPED | OUTPATIENT
Start: 2025-08-01

## 2025-08-01 RX ORDER — IOPAMIDOL 755 MG/ML
100 INJECTION, SOLUTION INTRAVASCULAR
Status: COMPLETED | OUTPATIENT
Start: 2025-08-01 | End: 2025-08-01

## 2025-08-01 RX ORDER — VARENICLINE TARTRATE 0.5 (11)-1
KIT ORAL
Qty: 1 EACH | Refills: 0 | Status: SHIPPED | OUTPATIENT
Start: 2025-08-01 | End: 2025-08-29

## 2025-08-01 RX ORDER — VARENICLINE TARTRATE 1 MG/1
1 TABLET, FILM COATED ORAL 2 TIMES DAILY
Qty: 56 TABLET | Refills: 4 | Status: SHIPPED | OUTPATIENT
Start: 2025-08-29 | End: 2026-01-16

## 2025-08-01 RX ADMIN — IOPAMIDOL 100 ML: 755 INJECTION, SOLUTION INTRAVENOUS at 09:43

## 2025-08-05 ENCOUNTER — PATIENT OUTREACH (OUTPATIENT)
Dept: ONCOLOGY | Facility: CLINIC | Age: 75
End: 2025-08-05
Payer: MEDICARE

## 2025-08-05 ENCOUNTER — OFFICE VISIT (OUTPATIENT)
Dept: SURGERY | Facility: CLINIC | Age: 75
End: 2025-08-05
Payer: MEDICARE

## 2025-08-05 ENCOUNTER — TELEPHONE (OUTPATIENT)
Dept: SURGERY | Facility: CLINIC | Age: 75
End: 2025-08-05

## 2025-08-05 VITALS
SYSTOLIC BLOOD PRESSURE: 134 MMHG | OXYGEN SATURATION: 96 % | DIASTOLIC BLOOD PRESSURE: 68 MMHG | HEIGHT: 60 IN | BODY MASS INDEX: 20.58 KG/M2 | WEIGHT: 104.8 LBS

## 2025-08-05 DIAGNOSIS — C34.31 MALIGNANT NEOPLASM OF LOWER LOBE OF RIGHT LUNG: Primary | ICD-10-CM

## 2025-08-05 RX ORDER — METOLAZONE 2.5 MG/1
1 TABLET ORAL DAILY
Status: ON HOLD | COMMUNITY
Start: 2025-07-30

## 2025-08-07 ENCOUNTER — OFFICE VISIT (OUTPATIENT)
Dept: PULMONOLOGY | Facility: HOSPITAL | Age: 75
End: 2025-08-07
Payer: MEDICARE

## 2025-08-07 ENCOUNTER — HOSPITAL ENCOUNTER (OUTPATIENT)
Dept: RESPIRATORY THERAPY | Facility: HOSPITAL | Age: 75
Discharge: HOME | End: 2025-08-07
Payer: MEDICARE

## 2025-08-07 VITALS — RESPIRATION RATE: 17 BRPM | OXYGEN SATURATION: 97 % | HEART RATE: 94 BPM

## 2025-08-07 VITALS
WEIGHT: 104 LBS | OXYGEN SATURATION: 96 % | HEIGHT: 60 IN | DIASTOLIC BLOOD PRESSURE: 66 MMHG | HEART RATE: 76 BPM | BODY MASS INDEX: 20.42 KG/M2 | RESPIRATION RATE: 14 BRPM | SYSTOLIC BLOOD PRESSURE: 113 MMHG

## 2025-08-07 DIAGNOSIS — R91.8 LUNG MASS: Primary | ICD-10-CM

## 2025-08-07 DIAGNOSIS — J44.9 CHRONIC OBSTRUCTIVE PULMONARY DISEASE, UNSPECIFIED COPD TYPE: ICD-10-CM

## 2025-08-07 PROCEDURE — 94664 DEMO&/EVAL PT USE INHALER: CPT

## 2025-08-07 PROCEDURE — 94060 EVALUATION OF WHEEZING: CPT

## 2025-08-07 PROCEDURE — A9270 NON-COVERED ITEM OR SERVICE: HCPCS | Performed by: INTERNAL MEDICINE

## 2025-08-07 PROCEDURE — 63710000001 ALBUTEROL SULFATE HFA 108 (90 BASE) MCG/ACT AEROSOL SOLUTION 6.7 G INHALER: Performed by: INTERNAL MEDICINE

## 2025-08-07 PROCEDURE — 94727 GAS DIL/WSHOT DETER LNG VOL: CPT

## 2025-08-07 PROCEDURE — 94729 DIFFUSING CAPACITY: CPT

## 2025-08-07 PROCEDURE — G0463 HOSPITAL OUTPT CLINIC VISIT: HCPCS

## 2025-08-07 PROCEDURE — 94799 UNLISTED PULMONARY SVC/PX: CPT

## 2025-08-07 RX ORDER — ALBUTEROL SULFATE 90 UG/1
2 INHALANT RESPIRATORY (INHALATION) ONCE
Status: COMPLETED | OUTPATIENT
Start: 2025-08-07 | End: 2025-08-07

## 2025-08-07 RX ADMIN — ALBUTEROL SULFATE 2 PUFF: 108 AEROSOL, METERED RESPIRATORY (INHALATION) at 07:12

## 2025-08-08 LAB
FUNGUS WND CULT: NORMAL
MYCOBACTERIUM SPEC CULT: NORMAL
NIGHT BLUE STAIN TISS: NORMAL

## 2025-08-12 ENCOUNTER — PREP FOR SURGERY (OUTPATIENT)
Dept: OTHER | Facility: HOSPITAL | Age: 75
End: 2025-08-12
Payer: MEDICARE

## 2025-08-12 DIAGNOSIS — R91.1 NODULE OF LOWER LOBE OF RIGHT LUNG: Primary | ICD-10-CM

## 2025-08-12 DIAGNOSIS — R94.5 ABNORMAL RESULTS OF LIVER FUNCTION STUDIES: ICD-10-CM

## 2025-08-12 PROBLEM — C34.31: Status: ACTIVE | Noted: 2025-08-12

## 2025-08-12 RX ORDER — SODIUM CHLORIDE 9 MG/ML
40 INJECTION, SOLUTION INTRAVENOUS AS NEEDED
Status: CANCELLED | OUTPATIENT
Start: 2025-08-18

## 2025-08-12 RX ORDER — SODIUM CHLORIDE 0.9 % (FLUSH) 0.9 %
10 SYRINGE (ML) INJECTION EVERY 12 HOURS SCHEDULED
Status: CANCELLED | OUTPATIENT
Start: 2025-08-18

## 2025-08-12 RX ORDER — SODIUM CHLORIDE 0.9 % (FLUSH) 0.9 %
10 SYRINGE (ML) INJECTION AS NEEDED
Status: CANCELLED | OUTPATIENT
Start: 2025-08-18

## 2025-08-13 ENCOUNTER — PREP FOR SURGERY (OUTPATIENT)
Dept: OTHER | Facility: HOSPITAL | Age: 75
End: 2025-08-13
Payer: MEDICARE

## 2025-08-13 ENCOUNTER — TELEPHONE (OUTPATIENT)
Dept: SURGERY | Facility: CLINIC | Age: 75
End: 2025-08-13
Payer: MEDICARE

## 2025-08-14 ENCOUNTER — DOCUMENTATION (OUTPATIENT)
Dept: ONCOLOGY | Facility: CLINIC | Age: 75
End: 2025-08-14
Payer: MEDICARE

## 2025-08-15 ENCOUNTER — HOSPITAL ENCOUNTER (OUTPATIENT)
Dept: GENERAL RADIOLOGY | Facility: HOSPITAL | Age: 75
Discharge: HOME | End: 2025-08-15
Payer: MEDICARE

## 2025-08-15 ENCOUNTER — LAB (OUTPATIENT)
Dept: LAB | Facility: HOSPITAL | Age: 75
End: 2025-08-15
Payer: MEDICARE

## 2025-08-15 ENCOUNTER — OFFICE VISIT (OUTPATIENT)
Dept: ONCOLOGY | Facility: CLINIC | Age: 75
End: 2025-08-15
Payer: MEDICARE

## 2025-08-15 ENCOUNTER — OFFICE VISIT (OUTPATIENT)
Dept: RADIATION ONCOLOGY | Facility: HOSPITAL | Age: 75
End: 2025-08-15
Payer: MEDICARE

## 2025-08-15 ENCOUNTER — HOSPITAL ENCOUNTER (OUTPATIENT)
Dept: CARDIOLOGY | Facility: HOSPITAL | Age: 75
Discharge: HOME | End: 2025-08-15
Payer: MEDICARE

## 2025-08-15 VITALS
TEMPERATURE: 97.9 F | SYSTOLIC BLOOD PRESSURE: 122 MMHG | OXYGEN SATURATION: 94 % | HEART RATE: 73 BPM | DIASTOLIC BLOOD PRESSURE: 86 MMHG | BODY MASS INDEX: 19.99 KG/M2 | HEIGHT: 60 IN | WEIGHT: 101.8 LBS

## 2025-08-15 VITALS
HEART RATE: 86 BPM | DIASTOLIC BLOOD PRESSURE: 77 MMHG | OXYGEN SATURATION: 93 % | WEIGHT: 104 LBS | BODY MASS INDEX: 20.31 KG/M2 | SYSTOLIC BLOOD PRESSURE: 122 MMHG

## 2025-08-15 DIAGNOSIS — C34.31 MALIGNANT NEOPLASM OF LOWER LOBE OF RIGHT LUNG: Primary | ICD-10-CM

## 2025-08-15 DIAGNOSIS — C34.90 MALIGNANT NEOPLASM OF LUNG, UNSPECIFIED LATERALITY, UNSPECIFIED PART OF LUNG: ICD-10-CM

## 2025-08-15 DIAGNOSIS — R91.1 NODULE OF LOWER LOBE OF RIGHT LUNG: ICD-10-CM

## 2025-08-15 DIAGNOSIS — R91.8 LUNG MASS: Primary | ICD-10-CM

## 2025-08-15 DIAGNOSIS — R94.5 ABNORMAL RESULTS OF LIVER FUNCTION STUDIES: ICD-10-CM

## 2025-08-15 LAB
ABO GROUP BLD: NORMAL
ALBUMIN SERPL-MCNC: 4.1 G/DL (ref 3.5–5.2)
ALBUMIN/GLOB SERPL: 1.5 G/DL
ALP SERPL-CCNC: 73 U/L (ref 39–117)
ALT SERPL W P-5'-P-CCNC: 11 U/L (ref 1–33)
ANION GAP SERPL CALCULATED.3IONS-SCNC: 11.5 MMOL/L (ref 5–15)
AST SERPL-CCNC: 14 U/L (ref 1–32)
BILIRUB SERPL-MCNC: 0.2 MG/DL (ref 0–1.2)
BLD GP AB SCN SERPL QL: NEGATIVE
BUN SERPL-MCNC: 22 MG/DL (ref 8–23)
BUN/CREAT SERPL: 23.2 (ref 7–25)
CALCIUM SPEC-SCNC: 9.7 MG/DL (ref 8.6–10.5)
CHLORIDE SERPL-SCNC: 97 MMOL/L (ref 98–107)
CO2 SERPL-SCNC: 25.5 MMOL/L (ref 22–29)
CREAT SERPL-MCNC: 0.95 MG/DL (ref 0.57–1)
DEPRECATED RDW RBC AUTO: 45.6 FL (ref 37–54)
EGFRCR SERPLBLD CKD-EPI 2021: 61.8 ML/MIN/1.73
ERYTHROCYTE [DISTWIDTH] IN BLOOD BY AUTOMATED COUNT: 13.2 % (ref 12.3–15.4)
FUNGUS WND CULT: NORMAL
GLOBULIN UR ELPH-MCNC: 2.8 GM/DL
GLUCOSE SERPL-MCNC: 91 MG/DL (ref 65–99)
HBA1C MFR BLD: 5.71 % (ref 4.8–5.6)
HCT VFR BLD AUTO: 42.7 % (ref 34–46.6)
HGB BLD-MCNC: 13.9 G/DL (ref 12–15.9)
INR PPP: 1.01 (ref 0.9–1.1)
MCH RBC QN AUTO: 30.6 PG (ref 26.6–33)
MCHC RBC AUTO-ENTMCNC: 32.6 G/DL (ref 31.5–35.7)
MCV RBC AUTO: 94.1 FL (ref 79–97)
MRSA DNA SPEC QL NAA+PROBE: NORMAL
MYCOBACTERIUM SPEC CULT: NORMAL
NIGHT BLUE STAIN TISS: NORMAL
PLATELET # BLD AUTO: 299 10*3/MM3 (ref 140–450)
PMV BLD AUTO: 8.3 FL (ref 6–12)
POTASSIUM SERPL-SCNC: 4 MMOL/L (ref 3.5–5.2)
PROT SERPL-MCNC: 6.9 G/DL (ref 6–8.5)
PROTHROMBIN TIME: 13.2 SECONDS (ref 11.7–14.2)
RBC # BLD AUTO: 4.54 10*6/MM3 (ref 3.77–5.28)
RH BLD: POSITIVE
SODIUM SERPL-SCNC: 134 MMOL/L (ref 136–145)
T&S EXPIRATION DATE: NORMAL
WBC NRBC COR # BLD AUTO: 7.19 10*3/MM3 (ref 3.4–10.8)

## 2025-08-15 PROCEDURE — 71046 X-RAY EXAM CHEST 2 VIEWS: CPT

## 2025-08-15 PROCEDURE — 93005 ELECTROCARDIOGRAM TRACING: CPT | Performed by: SURGERY

## 2025-08-15 PROCEDURE — G0463 HOSPITAL OUTPT CLINIC VISIT: HCPCS | Performed by: INTERNAL MEDICINE

## 2025-08-15 PROCEDURE — 3079F DIAST BP 80-89 MM HG: CPT | Performed by: STUDENT IN AN ORGANIZED HEALTH CARE EDUCATION/TRAINING PROGRAM

## 2025-08-15 PROCEDURE — 87641 MR-STAPH DNA AMP PROBE: CPT

## 2025-08-15 PROCEDURE — 86900 BLOOD TYPING SEROLOGIC ABO: CPT

## 2025-08-15 PROCEDURE — 1126F AMNT PAIN NOTED NONE PRSNT: CPT | Performed by: STUDENT IN AN ORGANIZED HEALTH CARE EDUCATION/TRAINING PROGRAM

## 2025-08-15 PROCEDURE — 85027 COMPLETE CBC AUTOMATED: CPT

## 2025-08-15 PROCEDURE — 3074F SYST BP LT 130 MM HG: CPT | Performed by: STUDENT IN AN ORGANIZED HEALTH CARE EDUCATION/TRAINING PROGRAM

## 2025-08-15 PROCEDURE — 86901 BLOOD TYPING SEROLOGIC RH(D): CPT

## 2025-08-15 PROCEDURE — 85610 PROTHROMBIN TIME: CPT

## 2025-08-15 PROCEDURE — 36415 COLL VENOUS BLD VENIPUNCTURE: CPT

## 2025-08-15 PROCEDURE — 80053 COMPREHEN METABOLIC PANEL: CPT

## 2025-08-15 PROCEDURE — 99214 OFFICE O/P EST MOD 30 MIN: CPT | Performed by: STUDENT IN AN ORGANIZED HEALTH CARE EDUCATION/TRAINING PROGRAM

## 2025-08-15 PROCEDURE — 83036 HEMOGLOBIN GLYCOSYLATED A1C: CPT

## 2025-08-15 PROCEDURE — 86850 RBC ANTIBODY SCREEN: CPT

## 2025-08-16 LAB
QT INTERVAL: 355 MS
QTC INTERVAL: 417 MS

## 2025-08-18 ENCOUNTER — ANESTHESIA EVENT (OUTPATIENT)
Dept: PERIOP | Facility: HOSPITAL | Age: 75
End: 2025-08-18
Payer: MEDICARE

## 2025-08-18 ENCOUNTER — APPOINTMENT (OUTPATIENT)
Dept: GENERAL RADIOLOGY | Facility: HOSPITAL | Age: 75
End: 2025-08-18
Payer: MEDICARE

## 2025-08-18 ENCOUNTER — ANESTHESIA (OUTPATIENT)
Dept: PERIOP | Facility: HOSPITAL | Age: 75
End: 2025-08-18
Payer: MEDICARE

## 2025-08-18 ENCOUNTER — HOSPITAL ENCOUNTER (INPATIENT)
Facility: HOSPITAL | Age: 75
LOS: 4 days | Discharge: HOME | End: 2025-08-22
Attending: SURGERY | Admitting: SURGERY
Payer: MEDICARE

## 2025-08-18 ENCOUNTER — ANESTHESIA EVENT CONVERTED (OUTPATIENT)
Dept: ANESTHESIOLOGY | Facility: HOSPITAL | Age: 75
End: 2025-08-18
Payer: MEDICARE

## 2025-08-18 PROCEDURE — 25010000002 MAGNESIUM SULFATE PER 500 MG OF MAGNESIUM: Performed by: NURSE ANESTHETIST, CERTIFIED REGISTERED

## 2025-08-18 PROCEDURE — 25010000002 MIDAZOLAM PER 1 MG: Performed by: ANESTHESIOLOGY

## 2025-08-18 PROCEDURE — 25810000003 SODIUM CHLORIDE 0.9 % SOLUTION 250 ML FLEX CONT: Performed by: NURSE ANESTHETIST, CERTIFIED REGISTERED

## 2025-08-18 PROCEDURE — 25010000002 LIDOCAINE PF 2% 2 % SOLUTION: Performed by: NURSE ANESTHETIST, CERTIFIED REGISTERED

## 2025-08-18 PROCEDURE — 25010000002 PHENYLEPHRINE 10 MG/ML SOLUTION: Performed by: NURSE ANESTHETIST, CERTIFIED REGISTERED

## 2025-08-18 PROCEDURE — 25810000003 LACTATED RINGERS PER 1000 ML: Performed by: NURSE ANESTHETIST, CERTIFIED REGISTERED

## 2025-08-18 PROCEDURE — 25010000002 DEXAMETHASONE PER 1 MG: Performed by: NURSE ANESTHETIST, CERTIFIED REGISTERED

## 2025-08-18 PROCEDURE — 25010000002 BUPIVACAINE LIPOSOME 1.3 % SUSPENSION: Performed by: ANESTHESIOLOGY

## 2025-08-18 PROCEDURE — 25010000002 ONDANSETRON PER 1 MG: Performed by: NURSE ANESTHETIST, CERTIFIED REGISTERED

## 2025-08-18 PROCEDURE — 25010000002 FENTANYL CITRATE (PF) 100 MCG/2ML SOLUTION: Performed by: NURSE ANESTHETIST, CERTIFIED REGISTERED

## 2025-08-18 PROCEDURE — 25010000002 PHENYLEPHRINE 10 MG/ML SOLUTION 5 ML VIAL: Performed by: NURSE ANESTHETIST, CERTIFIED REGISTERED

## 2025-08-18 PROCEDURE — 25010000002 PROPOFOL 200 MG/20ML EMULSION: Performed by: NURSE ANESTHETIST, CERTIFIED REGISTERED

## 2025-08-18 PROCEDURE — 25010000002 BUPIVACAINE (PF) 0.5 % SOLUTION: Performed by: ANESTHESIOLOGY

## 2025-08-18 RX ORDER — BUPIVACAINE HYDROCHLORIDE 5 MG/ML
INJECTION, SOLUTION EPIDURAL; INTRACAUDAL; PERINEURAL
Status: COMPLETED | OUTPATIENT
Start: 2025-08-18 | End: 2025-08-18

## 2025-08-18 RX ORDER — PHENYLEPHRINE HYDROCHLORIDE 10 MG/ML
INJECTION INTRAVENOUS AS NEEDED
Status: DISCONTINUED | OUTPATIENT
Start: 2025-08-18 | End: 2025-08-18 | Stop reason: SURG

## 2025-08-18 RX ORDER — MAGNESIUM SULFATE HEPTAHYDRATE 500 MG/ML
INJECTION, SOLUTION INTRAMUSCULAR; INTRAVENOUS AS NEEDED
Status: DISCONTINUED | OUTPATIENT
Start: 2025-08-18 | End: 2025-08-18 | Stop reason: SURG

## 2025-08-18 RX ORDER — SODIUM CHLORIDE, SODIUM LACTATE, POTASSIUM CHLORIDE, CALCIUM CHLORIDE 600; 310; 30; 20 MG/100ML; MG/100ML; MG/100ML; MG/100ML
INJECTION, SOLUTION INTRAVENOUS CONTINUOUS PRN
Status: DISCONTINUED | OUTPATIENT
Start: 2025-08-18 | End: 2025-08-18 | Stop reason: SURG

## 2025-08-18 RX ORDER — LIDOCAINE HYDROCHLORIDE 20 MG/ML
INJECTION, SOLUTION EPIDURAL; INFILTRATION; INTRACAUDAL; PERINEURAL AS NEEDED
Status: DISCONTINUED | OUTPATIENT
Start: 2025-08-18 | End: 2025-08-18 | Stop reason: SURG

## 2025-08-18 RX ORDER — SODIUM CHLORIDE 9 MG/ML
INJECTION, SOLUTION INTRAVENOUS CONTINUOUS PRN
Status: DISCONTINUED | OUTPATIENT
Start: 2025-08-18 | End: 2025-08-18 | Stop reason: SURG

## 2025-08-18 RX ORDER — ROCURONIUM BROMIDE 10 MG/ML
INJECTION, SOLUTION INTRAVENOUS AS NEEDED
Status: DISCONTINUED | OUTPATIENT
Start: 2025-08-18 | End: 2025-08-18 | Stop reason: SURG

## 2025-08-18 RX ORDER — PROPOFOL 10 MG/ML
INJECTION, EMULSION INTRAVENOUS AS NEEDED
Status: DISCONTINUED | OUTPATIENT
Start: 2025-08-18 | End: 2025-08-18 | Stop reason: SURG

## 2025-08-18 RX ORDER — ONDANSETRON 2 MG/ML
INJECTION INTRAMUSCULAR; INTRAVENOUS AS NEEDED
Status: DISCONTINUED | OUTPATIENT
Start: 2025-08-18 | End: 2025-08-18 | Stop reason: SURG

## 2025-08-18 RX ORDER — FENTANYL CITRATE 50 UG/ML
INJECTION, SOLUTION INTRAMUSCULAR; INTRAVENOUS AS NEEDED
Status: DISCONTINUED | OUTPATIENT
Start: 2025-08-18 | End: 2025-08-18 | Stop reason: SURG

## 2025-08-18 RX ORDER — MIDAZOLAM HYDROCHLORIDE 1 MG/ML
INJECTION, SOLUTION INTRAMUSCULAR; INTRAVENOUS
Status: COMPLETED | OUTPATIENT
Start: 2025-08-18 | End: 2025-08-18

## 2025-08-18 RX ORDER — DEXAMETHASONE SODIUM PHOSPHATE 4 MG/ML
INJECTION, SOLUTION INTRA-ARTICULAR; INTRALESIONAL; INTRAMUSCULAR; INTRAVENOUS; SOFT TISSUE AS NEEDED
Status: DISCONTINUED | OUTPATIENT
Start: 2025-08-18 | End: 2025-08-18 | Stop reason: SURG

## 2025-08-18 RX ADMIN — ROCURONIUM BROMIDE 30 MG: 10 INJECTION INTRAVENOUS at 08:45

## 2025-08-18 RX ADMIN — FENTANYL CITRATE 50 MCG: 50 INJECTION, SOLUTION INTRAMUSCULAR; INTRAVENOUS at 09:54

## 2025-08-18 RX ADMIN — ONDANSETRON 4 MG: 2 INJECTION INTRAMUSCULAR; INTRAVENOUS at 08:30

## 2025-08-18 RX ADMIN — ROCURONIUM BROMIDE 50 MG: 10 INJECTION INTRAVENOUS at 08:06

## 2025-08-18 RX ADMIN — PROPOFOL 150 MCG/KG/MIN: 10 INJECTION, EMULSION INTRAVENOUS at 08:07

## 2025-08-18 RX ADMIN — DEXMEDETOMIDINE HYDROCHLORIDE 8 MCG: 4 INJECTION, SOLUTION INTRAVENOUS at 09:51

## 2025-08-18 RX ADMIN — PROPOFOL 130 MG: 10 INJECTION, EMULSION INTRAVENOUS at 08:06

## 2025-08-18 RX ADMIN — BUPIVACAINE HYDROCHLORIDE 10 ML: 5 INJECTION, SOLUTION EPIDURAL; INTRACAUDAL; PERINEURAL at 07:34

## 2025-08-18 RX ADMIN — LIDOCAINE HYDROCHLORIDE 100 MG: 20 INJECTION, SOLUTION EPIDURAL; INFILTRATION; INTRACAUDAL; PERINEURAL at 08:06

## 2025-08-18 RX ADMIN — PHENYLEPHRINE HYDROCHLORIDE 0.5 MCG/KG/MIN: 10 INJECTION INTRAVENOUS at 08:06

## 2025-08-18 RX ADMIN — FENTANYL CITRATE 50 MCG: 50 INJECTION, SOLUTION INTRAMUSCULAR; INTRAVENOUS at 08:06

## 2025-08-18 RX ADMIN — SODIUM CHLORIDE, SODIUM LACTATE, POTASSIUM CHLORIDE, AND CALCIUM CHLORIDE: .6; .31; .03; .02 INJECTION, SOLUTION INTRAVENOUS at 09:01

## 2025-08-18 RX ADMIN — MIDAZOLAM 2 MG: 1 INJECTION INTRAMUSCULAR; INTRAVENOUS at 07:30

## 2025-08-18 RX ADMIN — SODIUM CHLORIDE: 9 INJECTION, SOLUTION INTRAVENOUS at 08:16

## 2025-08-18 RX ADMIN — DEXAMETHASONE SODIUM PHOSPHATE 4 MG: 4 INJECTION, SOLUTION INTRAMUSCULAR; INTRAVENOUS at 08:30

## 2025-08-18 RX ADMIN — PHENYLEPHRINE HYDROCHLORIDE 100 MCG: 10 INJECTION INTRAVENOUS at 09:22

## 2025-08-18 RX ADMIN — MAGNESIUM SULFATE HEPTAHYDRATE 2 G: 500 INJECTION, SOLUTION INTRAMUSCULAR; INTRAVENOUS at 08:39

## 2025-08-18 RX ADMIN — SODIUM CHLORIDE, SODIUM LACTATE, POTASSIUM CHLORIDE, AND CALCIUM CHLORIDE: .6; .31; .03; .02 INJECTION, SOLUTION INTRAVENOUS at 08:00

## 2025-08-18 RX ADMIN — BUPIVACAINE 10 ML: 13.3 INJECTION, SUSPENSION, LIPOSOMAL INFILTRATION at 07:34

## 2025-08-19 ENCOUNTER — APPOINTMENT (OUTPATIENT)
Dept: GENERAL RADIOLOGY | Facility: HOSPITAL | Age: 75
End: 2025-08-19
Payer: MEDICARE

## 2025-08-20 ENCOUNTER — APPOINTMENT (OUTPATIENT)
Dept: GENERAL RADIOLOGY | Facility: HOSPITAL | Age: 75
End: 2025-08-20
Payer: MEDICARE

## 2025-08-21 ENCOUNTER — APPOINTMENT (OUTPATIENT)
Dept: GENERAL RADIOLOGY | Facility: HOSPITAL | Age: 75
End: 2025-08-21
Payer: MEDICARE

## 2025-08-22 ENCOUNTER — READMISSION MANAGEMENT (OUTPATIENT)
Dept: CALL CENTER | Facility: HOSPITAL | Age: 75
End: 2025-08-22
Payer: MEDICARE

## 2025-08-22 ENCOUNTER — APPOINTMENT (OUTPATIENT)
Dept: GENERAL RADIOLOGY | Facility: HOSPITAL | Age: 75
End: 2025-08-22
Payer: MEDICARE

## 2025-08-22 LAB
FUNGUS WND CULT: NORMAL
MYCOBACTERIUM SPEC CULT: NORMAL
NIGHT BLUE STAIN TISS: NORMAL

## 2025-08-27 ENCOUNTER — READMISSION MANAGEMENT (OUTPATIENT)
Dept: CALL CENTER | Facility: HOSPITAL | Age: 75
End: 2025-08-27
Payer: MEDICARE

## 2025-08-27 DIAGNOSIS — C34.90 MALIGNANT NEOPLASM OF LUNG, UNSPECIFIED LATERALITY, UNSPECIFIED PART OF LUNG: Primary | ICD-10-CM

## 2025-08-29 ENCOUNTER — HOSPITAL ENCOUNTER (EMERGENCY)
Facility: HOSPITAL | Age: 75
Discharge: HOME | End: 2025-08-29
Attending: EMERGENCY MEDICINE
Payer: MEDICARE

## 2025-08-29 ENCOUNTER — APPOINTMENT (OUTPATIENT)
Dept: GENERAL RADIOLOGY | Facility: HOSPITAL | Age: 75
End: 2025-08-29
Payer: MEDICARE

## 2025-08-29 ENCOUNTER — TELEPHONE (OUTPATIENT)
Dept: SURGERY | Facility: CLINIC | Age: 75
End: 2025-08-29
Payer: MEDICARE

## 2025-08-29 VITALS
WEIGHT: 109.35 LBS | OXYGEN SATURATION: 93 % | BODY MASS INDEX: 21.47 KG/M2 | SYSTOLIC BLOOD PRESSURE: 114 MMHG | RESPIRATION RATE: 17 BRPM | HEART RATE: 77 BPM | TEMPERATURE: 97.6 F | DIASTOLIC BLOOD PRESSURE: 65 MMHG | HEIGHT: 60 IN

## 2025-08-29 DIAGNOSIS — R09.89 PULSE IRREGULARITY: Primary | ICD-10-CM

## 2025-08-29 LAB
ALBUMIN SERPL-MCNC: 3.6 G/DL (ref 3.5–5.2)
ALBUMIN/GLOB SERPL: 1.6 G/DL
ALP SERPL-CCNC: 116 U/L (ref 39–117)
ALT SERPL W P-5'-P-CCNC: 41 U/L (ref 1–33)
ANION GAP SERPL CALCULATED.3IONS-SCNC: 9.6 MMOL/L (ref 5–15)
AST SERPL-CCNC: 22 U/L (ref 1–32)
BASOPHILS # BLD AUTO: 0.04 10*3/MM3 (ref 0–0.2)
BASOPHILS NFR BLD AUTO: 0.5 % (ref 0–1.5)
BILIRUB SERPL-MCNC: <0.2 MG/DL (ref 0–1.2)
BUN SERPL-MCNC: 23.3 MG/DL (ref 8–23)
BUN/CREAT SERPL: 26.2 (ref 7–25)
CALCIUM SPEC-SCNC: 9.4 MG/DL (ref 8.6–10.5)
CHLORIDE SERPL-SCNC: 99 MMOL/L (ref 98–107)
CO2 SERPL-SCNC: 27.4 MMOL/L (ref 22–29)
CREAT SERPL-MCNC: 0.89 MG/DL (ref 0.57–1)
DEPRECATED RDW RBC AUTO: 49.1 FL (ref 37–54)
EGFRCR SERPLBLD CKD-EPI 2021: 66.9 ML/MIN/1.73
EOSINOPHIL # BLD AUTO: 0.43 10*3/MM3 (ref 0–0.4)
EOSINOPHIL NFR BLD AUTO: 5.3 % (ref 0.3–6.2)
ERYTHROCYTE [DISTWIDTH] IN BLOOD BY AUTOMATED COUNT: 13.7 % (ref 12.3–15.4)
GLOBULIN UR ELPH-MCNC: 2.2 GM/DL
GLUCOSE SERPL-MCNC: 103 MG/DL (ref 65–99)
HCT VFR BLD AUTO: 29.6 % (ref 34–46.6)
HGB BLD-MCNC: 9.7 G/DL (ref 12–15.9)
HOLD SPECIMEN: NORMAL
IMM GRANULOCYTES # BLD AUTO: 0.04 10*3/MM3 (ref 0–0.05)
IMM GRANULOCYTES NFR BLD AUTO: 0.5 % (ref 0–0.5)
LYMPHOCYTES # BLD AUTO: 1.25 10*3/MM3 (ref 0.7–3.1)
LYMPHOCYTES NFR BLD AUTO: 15.3 % (ref 19.6–45.3)
MAGNESIUM SERPL-MCNC: 2.3 MG/DL (ref 1.6–2.4)
MCH RBC QN AUTO: 31.8 PG (ref 26.6–33)
MCHC RBC AUTO-ENTMCNC: 32.8 G/DL (ref 31.5–35.7)
MCV RBC AUTO: 97 FL (ref 79–97)
MONOCYTES # BLD AUTO: 0.77 10*3/MM3 (ref 0.1–0.9)
MONOCYTES NFR BLD AUTO: 9.4 % (ref 5–12)
MYCOBACTERIUM SPEC CULT: NORMAL
NEUTROPHILS NFR BLD AUTO: 5.66 10*3/MM3 (ref 1.7–7)
NEUTROPHILS NFR BLD AUTO: 69 % (ref 42.7–76)
NIGHT BLUE STAIN TISS: NORMAL
NRBC BLD AUTO-RTO: 0 /100 WBC (ref 0–0.2)
PLAT MORPH BLD: NORMAL
PLATELET # BLD AUTO: 472 10*3/MM3 (ref 140–450)
PMV BLD AUTO: 9 FL (ref 6–12)
POTASSIUM SERPL-SCNC: 4.1 MMOL/L (ref 3.5–5.2)
PROT SERPL-MCNC: 5.8 G/DL (ref 6–8.5)
RBC # BLD AUTO: 3.05 10*6/MM3 (ref 3.77–5.28)
RBC MORPH BLD: NORMAL
SODIUM SERPL-SCNC: 136 MMOL/L (ref 136–145)
TSH SERPL DL<=0.05 MIU/L-ACNC: 1.94 UIU/ML (ref 0.27–4.2)
WBC MORPH BLD: NORMAL
WBC NRBC COR # BLD AUTO: 8.19 10*3/MM3 (ref 3.4–10.8)

## 2025-08-29 PROCEDURE — 80053 COMPREHEN METABOLIC PANEL: CPT | Performed by: EMERGENCY MEDICINE

## 2025-08-29 PROCEDURE — 93005 ELECTROCARDIOGRAM TRACING: CPT | Performed by: EMERGENCY MEDICINE

## 2025-08-29 PROCEDURE — 83735 ASSAY OF MAGNESIUM: CPT | Performed by: EMERGENCY MEDICINE

## 2025-08-29 PROCEDURE — 84443 ASSAY THYROID STIM HORMONE: CPT | Performed by: EMERGENCY MEDICINE

## 2025-08-29 PROCEDURE — 71045 X-RAY EXAM CHEST 1 VIEW: CPT

## 2025-08-29 PROCEDURE — 85007 BL SMEAR W/DIFF WBC COUNT: CPT | Performed by: EMERGENCY MEDICINE

## 2025-08-29 PROCEDURE — 85025 COMPLETE CBC W/AUTO DIFF WBC: CPT | Performed by: EMERGENCY MEDICINE

## 2025-08-29 PROCEDURE — 93005 ELECTROCARDIOGRAM TRACING: CPT

## 2025-08-29 RX ORDER — SODIUM CHLORIDE 0.9 % (FLUSH) 0.9 %
10 SYRINGE (ML) INJECTION AS NEEDED
Status: DISCONTINUED | OUTPATIENT
Start: 2025-08-29 | End: 2025-08-29 | Stop reason: HOSPADM

## 2025-08-30 LAB
QT INTERVAL: 369 MS
QTC INTERVAL: 436 MS

## (undated) DEVICE — BIOPSY NEEDLE, 21G: Brand: FLEXISION

## (undated) DEVICE — SWIVEL CONNECTOR

## (undated) DEVICE — Device: Brand: ION

## (undated) DEVICE — CATHETER GUIDE

## (undated) DEVICE — Device: Brand: ERBE

## (undated) DEVICE — BAPTIST FLOYD BRONCHOSCOPY: Brand: MEDLINE INDUSTRIES, INC.

## (undated) DEVICE — BIOPSY NEEDLE, 23G: Brand: FLEXISION

## (undated) DEVICE — VISION PROBE ADAPTER AND SUCTION ADAPTER

## (undated) DEVICE — VISION PROBE: Brand: ION

## (undated) DEVICE — CATHETER: Brand: ION